# Patient Record
Sex: FEMALE | Race: WHITE | NOT HISPANIC OR LATINO | Employment: OTHER | URBAN - METROPOLITAN AREA
[De-identification: names, ages, dates, MRNs, and addresses within clinical notes are randomized per-mention and may not be internally consistent; named-entity substitution may affect disease eponyms.]

---

## 2023-05-18 LAB
GRAM STAIN: ABNORMAL
TISSUE/WOUND CULTURE/SMEAR: ABNORMAL
TISSUE/WOUND CULTURE/SMEAR: ABNORMAL

## 2023-05-26 ENCOUNTER — TELEPHONE (OUTPATIENT)
Dept: PRIMARY CARE | Facility: CLINIC | Age: 88
End: 2023-05-26
Payer: MEDICARE

## 2023-05-26 NOTE — TELEPHONE ENCOUNTER
----- Message from CALIXTO Noriega sent at 5/26/2023 10:16 AM EDT -----  Please reach out to the patient's daughter. The wound clinic CNP notified me of a lump to her hand and and an x-ray that was done. Was there an injury to the hand? Is she having pain where the lump is? Is the lump new?

## 2023-06-05 ENCOUNTER — OFFICE VISIT (OUTPATIENT)
Dept: PRIMARY CARE | Facility: CLINIC | Age: 88
End: 2023-06-05
Payer: MEDICARE

## 2023-06-05 VITALS — HEART RATE: 61 BPM | DIASTOLIC BLOOD PRESSURE: 82 MMHG | SYSTOLIC BLOOD PRESSURE: 163 MMHG

## 2023-06-05 DIAGNOSIS — D64.9 ANEMIA, UNSPECIFIED TYPE: ICD-10-CM

## 2023-06-05 DIAGNOSIS — I87.2 VENOUS INSUFFICIENCY: ICD-10-CM

## 2023-06-05 DIAGNOSIS — I10 BENIGN ESSENTIAL HYPERTENSION: Primary | ICD-10-CM

## 2023-06-05 DIAGNOSIS — M17.12 OSTEOARTHRITIS OF LEFT KNEE, UNSPECIFIED OSTEOARTHRITIS TYPE: ICD-10-CM

## 2023-06-05 DIAGNOSIS — Z71.89 DNR (DO NOT RESUSCITATE) DISCUSSION: ICD-10-CM

## 2023-06-05 PROBLEM — M17.9 OSTEOARTHRITIS OF KNEE: Status: ACTIVE | Noted: 2023-06-05

## 2023-06-05 PROBLEM — M17.11 ARTHRITIS OF RIGHT KNEE: Status: ACTIVE | Noted: 2023-06-05

## 2023-06-05 PROBLEM — M47.817 LUMBOSACRAL SPONDYLOSIS WITHOUT MYELOPATHY: Status: ACTIVE | Noted: 2018-10-04

## 2023-06-05 PROBLEM — F43.21 SITUATIONAL DEPRESSION: Status: ACTIVE | Noted: 2023-06-05

## 2023-06-05 PROBLEM — K43.9 SPIGELIAN HERNIA: Status: ACTIVE | Noted: 2023-06-05

## 2023-06-05 PROBLEM — E53.8 B12 DEFICIENCY: Status: ACTIVE | Noted: 2023-06-05

## 2023-06-05 PROBLEM — M19.90 ARTHRITIS PAIN: Status: ACTIVE | Noted: 2023-06-05

## 2023-06-05 PROBLEM — E87.1 HYPONATREMIA: Status: ACTIVE | Noted: 2023-06-05

## 2023-06-05 PROBLEM — G89.29 CHRONIC PAIN OF RIGHT KNEE: Status: ACTIVE | Noted: 2018-11-16

## 2023-06-05 PROBLEM — K83.8 INTRAHEPATIC BILE DUCT DILATION: Status: ACTIVE | Noted: 2023-06-05

## 2023-06-05 PROBLEM — L03.116 CELLULITIS OF LEFT LOWER LEG: Status: ACTIVE | Noted: 2023-06-05

## 2023-06-05 PROBLEM — M25.561 CHRONIC PAIN OF RIGHT KNEE: Status: ACTIVE | Noted: 2018-11-16

## 2023-06-05 PROBLEM — M47.26 OSTEOARTHRITIS OF SPINE WITH RADICULOPATHY, LUMBAR REGION: Status: ACTIVE | Noted: 2017-04-03

## 2023-06-05 PROBLEM — N34.2 ATROPHIC URETHRITIS: Status: ACTIVE | Noted: 2023-06-05

## 2023-06-05 PROBLEM — R19.8 ALTERNATING CONSTIPATION AND DIARRHEA: Status: ACTIVE | Noted: 2023-06-05

## 2023-06-05 PROBLEM — K62.5 RECTAL BLEEDING: Status: ACTIVE | Noted: 2023-06-05

## 2023-06-05 PROBLEM — K90.3 STEATORRHEA, PANCREATIC (HHS-HCC): Status: ACTIVE | Noted: 2023-06-05

## 2023-06-05 PROBLEM — I89.0 LYMPHEDEMA OF BOTH LOWER EXTREMITIES: Status: ACTIVE | Noted: 2023-06-05

## 2023-06-05 PROBLEM — S32.810A CLOSED PELVIC RING FRACTURE (MULTI): Status: ACTIVE | Noted: 2023-06-05

## 2023-06-05 PROBLEM — R07.89 ATYPICAL CHEST PAIN: Status: ACTIVE | Noted: 2023-06-05

## 2023-06-05 PROBLEM — Q45.3 ABNORMALITY OF PANCREATIC DUCT: Status: ACTIVE | Noted: 2023-06-05

## 2023-06-05 PROCEDURE — 1157F ADVNC CARE PLAN IN RCRD: CPT | Performed by: NURSE PRACTITIONER

## 2023-06-05 PROCEDURE — 3077F SYST BP >= 140 MM HG: CPT | Performed by: NURSE PRACTITIONER

## 2023-06-05 PROCEDURE — 3079F DIAST BP 80-89 MM HG: CPT | Performed by: NURSE PRACTITIONER

## 2023-06-05 PROCEDURE — 99214 OFFICE O/P EST MOD 30 MIN: CPT | Performed by: NURSE PRACTITIONER

## 2023-06-05 PROCEDURE — 1159F MED LIST DOCD IN RCRD: CPT | Performed by: NURSE PRACTITIONER

## 2023-06-05 PROCEDURE — 1036F TOBACCO NON-USER: CPT | Performed by: NURSE PRACTITIONER

## 2023-06-05 RX ORDER — LISINOPRIL AND HYDROCHLOROTHIAZIDE 10; 12.5 MG/1; MG/1
1 TABLET ORAL DAILY
COMMUNITY
Start: 2013-07-31 | End: 2023-07-10

## 2023-06-05 RX ORDER — LANOLIN ALCOHOL/MO/W.PET/CERES
500 CREAM (GRAM) TOPICAL DAILY
COMMUNITY
End: 2023-10-03 | Stop reason: ENTERED-IN-ERROR

## 2023-06-05 RX ORDER — DICYCLOMINE HYDROCHLORIDE 10 MG/1
1 CAPSULE ORAL EVERY 6 HOURS PRN
COMMUNITY
Start: 2023-05-23 | End: 2023-12-21 | Stop reason: ALTCHOICE

## 2023-06-05 RX ORDER — ACETAMINOPHEN 325 MG/1
325 TABLET ORAL EVERY 6 HOURS PRN
COMMUNITY
End: 2023-10-03 | Stop reason: ENTERED-IN-ERROR

## 2023-06-05 RX ORDER — CYANOCOBALAMIN 1000 UG/ML
1 INJECTION, SOLUTION INTRAMUSCULAR; SUBCUTANEOUS
COMMUNITY
Start: 2016-05-25 | End: 2023-06-05 | Stop reason: ALTCHOICE

## 2023-06-05 RX ORDER — IBUPROFEN 100 MG/5ML
1 SUSPENSION, ORAL (FINAL DOSE FORM) ORAL DAILY
COMMUNITY

## 2023-06-05 RX ORDER — VIT C/E/ZN/COPPR/LUTEIN/ZEAXAN 250MG-90MG
1 CAPSULE ORAL DAILY
COMMUNITY

## 2023-06-05 NOTE — PROGRESS NOTES
Subjective   Patient ID: Cassia Humphries is a 89 y.o. female who presents for Follow-up (Knees, x ray hand).    HPI  She is here with her daughter today for a follow up.  Tolerating medication well at current dose- no side effects. No chest pain, shortness of breath, palpitations or edema. No headaches, numbness, tingling, weakness or vision changes.  No dizziness.    She report she is having a lot of knee, back pain and shoulder pain.  She has also noted a lump to the right hand- recent x-ray reviewed today.  Is seeing a PA in Dr. Herring's office to discuss pain management.  Has tried maximum dose of daily tylenol.  She has also tried Naproxen, Gabapentin,Voltaren- no help.  She has also had cortisone injections.   Has also had an injection in the back.     Had a follow up visit with Dr. Maher.  Still has issues with diarrhea and soft stools.  Was on Creon and stools were too soft.  Started on bentyl for abdominal cramping recently.    Her daughter is currently in town and trying to get her to appointments. She is starting to think about assisted living/ nursing home due to her limited ability to walk. She lives alone in a 2 story home. She has a life alert.   She also has am electric stair lift to get upstairs and 2 walkers (1 for each floor).  Her daughter has noticed how much worse her knee is on the left. She is now not able to stand up straight and is dragging her foot.  She is not a surgical candidate.  She will be discussing options with pan management.  They would like to avoid opioid pain medications.    She is also following with wound care for an open are to the E.    We discussed long term goals of care including DNR today. She reports she does not wish to have CPR or life sustaining measures taken if her heart would stop.  DNRCCA signed today.    Review of Systems   Constitutional:  Negative for chills, fatigue and fever.   Eyes:  Negative for visual disturbance.   Respiratory:  Negative for cough,  chest tightness and shortness of breath.    Cardiovascular:  Negative for chest pain, palpitations and leg swelling.   Gastrointestinal:  Positive for abdominal pain. Negative for diarrhea, nausea and vomiting.   Musculoskeletal:  Positive for arthralgias, back pain and joint swelling.   Skin:  Positive for wound.   Neurological:  Positive for weakness. Negative for dizziness, numbness and headaches.       Objective   /82   Pulse 61     Physical Exam  Constitutional:       General: She is not in acute distress.     Appearance: Normal appearance. She is not toxic-appearing.   Eyes:      Extraocular Movements: Extraocular movements intact.      Conjunctiva/sclera: Conjunctivae normal.      Pupils: Pupils are equal, round, and reactive to light.   Cardiovascular:      Rate and Rhythm: Normal rate and regular rhythm.      Pulses: Normal pulses.      Heart sounds: Normal heart sounds, S1 normal and S2 normal. No murmur heard.  Pulmonary:      Effort: Pulmonary effort is normal. No respiratory distress.      Breath sounds: Normal breath sounds.   Abdominal:      General: Bowel sounds are normal.      Palpations: Abdomen is soft.      Tenderness: There is no abdominal tenderness.   Musculoskeletal:      Right lower leg: No edema.      Left lower leg: No edema.      Comments: (+) mobile lump noted to the right dorsal hand   (+) slightly TTP   Lymphadenopathy:      Cervical: No cervical adenopathy.   Neurological:      Mental Status: She is alert and oriented to person, place, and time.      Gait: Gait abnormal.   Psychiatric:         Attention and Perception: Attention normal.         Mood and Affect: Mood and affect normal.         Behavior: Behavior normal. Behavior is cooperative.         Thought Content: Thought content normal.         Cognition and Memory: Cognition normal.         Judgment: Judgment normal.         Assessment/Plan   Problem List Items Addressed This Visit          Circulatory    Benign  essential hypertension - Primary     Elevated today but generally well controlled. Check labs.         Relevant Orders    Comprehensive Metabolic Panel (Completed)    CBC (Completed)    Venous insufficiency     Edema is good today.            Musculoskeletal    Osteoarthritis of knee     Given deformity today on exam-and pain- check imaging.         Relevant Orders    XR knee left 3 views       Hematologic    Anemia     Check CBC to assess stability.            Other    DNR (do not resuscitate) discussion     DNRCCA signed today after discussion with the patient and her daughter Charlette (HCPOA).                 It has been a pleasure seeing you today!

## 2023-06-06 ENCOUNTER — LAB (OUTPATIENT)
Dept: LAB | Facility: LAB | Age: 88
End: 2023-06-06
Payer: MEDICARE

## 2023-06-06 DIAGNOSIS — I10 BENIGN ESSENTIAL HYPERTENSION: ICD-10-CM

## 2023-06-06 PROBLEM — Z71.89 DNR (DO NOT RESUSCITATE) DISCUSSION: Status: ACTIVE | Noted: 2023-06-06

## 2023-06-06 LAB
ALANINE AMINOTRANSFERASE (SGPT) (U/L) IN SER/PLAS: 14 U/L (ref 7–45)
ALBUMIN (G/DL) IN SER/PLAS: 3.8 G/DL (ref 3.4–5)
ALKALINE PHOSPHATASE (U/L) IN SER/PLAS: 68 U/L (ref 33–136)
ANION GAP IN SER/PLAS: 12 MMOL/L (ref 10–20)
ASPARTATE AMINOTRANSFERASE (SGOT) (U/L) IN SER/PLAS: 17 U/L (ref 9–39)
BILIRUBIN TOTAL (MG/DL) IN SER/PLAS: 0.5 MG/DL (ref 0–1.2)
CALCIUM (MG/DL) IN SER/PLAS: 9.7 MG/DL (ref 8.6–10.3)
CARBON DIOXIDE, TOTAL (MMOL/L) IN SER/PLAS: 25 MMOL/L (ref 21–32)
CHLORIDE (MMOL/L) IN SER/PLAS: 101 MMOL/L (ref 98–107)
CREATININE (MG/DL) IN SER/PLAS: 0.82 MG/DL (ref 0.5–1.05)
ERYTHROCYTE DISTRIBUTION WIDTH (RATIO) BY AUTOMATED COUNT: 14.7 % (ref 11.5–14.5)
ERYTHROCYTE MEAN CORPUSCULAR HEMOGLOBIN CONCENTRATION (G/DL) BY AUTOMATED: 32.6 G/DL (ref 32–36)
ERYTHROCYTE MEAN CORPUSCULAR VOLUME (FL) BY AUTOMATED COUNT: 103 FL (ref 80–100)
ERYTHROCYTES (10*6/UL) IN BLOOD BY AUTOMATED COUNT: 3.36 X10E12/L (ref 4–5.2)
GFR FEMALE: 67 ML/MIN/1.73M2
GLUCOSE (MG/DL) IN SER/PLAS: 78 MG/DL (ref 74–99)
HEMATOCRIT (%) IN BLOOD BY AUTOMATED COUNT: 34.7 % (ref 36–46)
HEMOGLOBIN (G/DL) IN BLOOD: 11.3 G/DL (ref 12–16)
LEUKOCYTES (10*3/UL) IN BLOOD BY AUTOMATED COUNT: 5.1 X10E9/L (ref 4.4–11.3)
PLATELETS (10*3/UL) IN BLOOD AUTOMATED COUNT: 333 X10E9/L (ref 150–450)
POTASSIUM (MMOL/L) IN SER/PLAS: 4.7 MMOL/L (ref 3.5–5.3)
PROTEIN TOTAL: 6.2 G/DL (ref 6.4–8.2)
SODIUM (MMOL/L) IN SER/PLAS: 133 MMOL/L (ref 136–145)
UREA NITROGEN (MG/DL) IN SER/PLAS: 23 MG/DL (ref 6–23)

## 2023-06-06 PROCEDURE — 36415 COLL VENOUS BLD VENIPUNCTURE: CPT

## 2023-06-06 PROCEDURE — 85027 COMPLETE CBC AUTOMATED: CPT

## 2023-06-06 PROCEDURE — 80053 COMPREHEN METABOLIC PANEL: CPT

## 2023-06-07 ASSESSMENT — ENCOUNTER SYMPTOMS
DIARRHEA: 0
DIZZINESS: 0
CHILLS: 0
FEVER: 0
NUMBNESS: 0
ARTHRALGIAS: 1
JOINT SWELLING: 1
FATIGUE: 0
NAUSEA: 0
VOMITING: 0
BACK PAIN: 1
WEAKNESS: 1
CHEST TIGHTNESS: 0
COUGH: 0
SHORTNESS OF BREATH: 0
PALPITATIONS: 0
ABDOMINAL PAIN: 1
WOUND: 1
HEADACHES: 0

## 2023-06-08 ENCOUNTER — TELEPHONE (OUTPATIENT)
Dept: PRIMARY CARE | Facility: CLINIC | Age: 88
End: 2023-06-08
Payer: MEDICARE

## 2023-06-08 RX ORDER — DULOXETIN HYDROCHLORIDE 20 MG/1
20 CAPSULE, DELAYED RELEASE ORAL NIGHTLY
Qty: 30 CAPSULE | Refills: 0 | COMMUNITY
Start: 2023-06-07 | End: 2023-10-09 | Stop reason: HOSPADM

## 2023-06-08 NOTE — TELEPHONE ENCOUNTER
Called patients daughter to clarify. She was awaiting to hear about the results. And if possible a image printout of the xray results to take to wound care. They did start her on Duloxetine 20 mg.

## 2023-06-09 ENCOUNTER — TELEPHONE (OUTPATIENT)
Dept: PRIMARY CARE | Facility: CLINIC | Age: 88
End: 2023-06-09
Payer: MEDICARE

## 2023-06-09 NOTE — TELEPHONE ENCOUNTER
----- Message from HELEN Noriega-CNP sent at 6/8/2023  4:55 PM EDT -----  Please let her daughter know her x-ray of the knee shows severe arthritis (progressed from last x-ray).

## 2023-06-09 NOTE — TELEPHONE ENCOUNTER
----- Message from HELEN Noriega-CNP sent at 6/8/2023  4:56 PM EDT -----  Please let her daughter know her labs overall were stable.  Her sodium level remains low at 133 but this is stable.  She still has a very mild anemia (stable).

## 2023-07-10 DIAGNOSIS — I10 BENIGN ESSENTIAL HYPERTENSION: Primary | ICD-10-CM

## 2023-07-10 PROBLEM — M20.41 HAMMER TOE OF RIGHT FOOT: Status: ACTIVE | Noted: 2023-07-10

## 2023-07-10 RX ORDER — LISINOPRIL AND HYDROCHLOROTHIAZIDE 10; 12.5 MG/1; MG/1
1 TABLET ORAL DAILY
Qty: 90 TABLET | Refills: 1 | Status: SHIPPED | OUTPATIENT
Start: 2023-07-10 | End: 2023-09-20 | Stop reason: SINTOL

## 2023-09-13 ENCOUNTER — TELEPHONE (OUTPATIENT)
Dept: PRIMARY CARE | Facility: CLINIC | Age: 88
End: 2023-09-13
Payer: MEDICARE

## 2023-09-13 NOTE — TELEPHONE ENCOUNTER
Spoke with Edie at [facility], Cassia is still listed under Dr Noonan's care, so I did go ahead and give her the new Tylenol order, she verbalized understanding

## 2023-09-13 NOTE — TELEPHONE ENCOUNTER
Edie, charge nurse at [facility], left a msg asking for the pt's tylenol order to be changed to Tylenol 500mg, 2 tabs BID.

## 2023-09-20 ENCOUNTER — NURSING HOME VISIT (OUTPATIENT)
Dept: PRIMARY CARE | Facility: CLINIC | Age: 88
End: 2023-09-20
Payer: MEDICARE

## 2023-09-20 VITALS
BODY MASS INDEX: 26.37 KG/M2 | DIASTOLIC BLOOD PRESSURE: 70 MMHG | HEART RATE: 66 BPM | SYSTOLIC BLOOD PRESSURE: 133 MMHG | WEIGHT: 135 LBS

## 2023-09-20 DIAGNOSIS — R00.2 PALPITATIONS: ICD-10-CM

## 2023-09-20 DIAGNOSIS — I26.99 PULMONARY EMBOLISM WITHOUT ACUTE COR PULMONALE, UNSPECIFIED CHRONICITY, UNSPECIFIED PULMONARY EMBOLISM TYPE (MULTI): Primary | ICD-10-CM

## 2023-09-20 PROCEDURE — 99309 SBSQ NF CARE MODERATE MDM 30: CPT | Performed by: INTERNAL MEDICINE

## 2023-09-20 RX ORDER — LISINOPRIL 5 MG/1
10 TABLET ORAL DAILY
COMMUNITY
End: 2023-10-03 | Stop reason: ENTERED-IN-ERROR

## 2023-09-20 NOTE — PROGRESS NOTES
Subjective   Patient ID: Cassia Humphries is a 89 y.o. female who presents for f/u      HPI   Overall well   #1 PE/DVT- discharge note 9/1/23 reviewed.  Presented after a fall and diagnosed with lower extremity DVT with multiple PEs.  No hemodynamic compromise.  Started on oral anticoagulation without complication.  Currently without chest pain.  No shortness of breath.  #2 ? Afib-some question of atrial fibrillation in the hospital.  [place] not to be A-fib by cardiology.  Cardiology recommended Holter monitor on discharge.  #3 HTN-hydrochlorothiazide discontinued in the hospital secondary to hyponatremia.  Since return has been 130-150 systolic.  #4 hyponatremia-off of hydrochlorothiazide nearing   #5 anemia- no bleeding or bruising.  Review of Systems   All other systems reviewed and are negative.      Objective   There were no vitals taken for this visit.    Physical Exam  Constitutional:       General: She is not in acute distress.     Appearance: Normal appearance. She is not ill-appearing or toxic-appearing.   HENT:      Head: Normocephalic and atraumatic.   Cardiovascular:      Rate and Rhythm: Normal rate and regular rhythm.      Heart sounds: No murmur heard.  Pulmonary:      Effort: Pulmonary effort is normal.      Breath sounds: Normal breath sounds. No wheezing or rales.   Neurological:      Mental Status: She is alert.   Psychiatric:         Mood and Affect: Mood normal.         Behavior: Behavior normal.         Judgment: Judgment normal.             Lab Results   Component Value Date    WBC 5.2 09/02/2023    HGB 11.1 (L) 09/02/2023    HCT 32.6 (L) 09/02/2023     09/02/2023    CHOL 204 (H) 08/10/2018    TRIG 111 08/10/2018    HDL 59.6 08/10/2018    ALT 17 09/01/2023    AST 21 09/01/2023     (L) 09/01/2023    K 4.1 09/01/2023     09/01/2023    CREATININE 0.71 09/01/2023    BUN 10 09/01/2023    CO2 22 09/01/2023    TSH 1.48 03/14/2022    INR 1.0 08/30/2023              CT Angiogram of  the Chest; 8/30/2023 2:31 PM.  INDICATION:  Syncope.  Elevated troponin.  COMPARISON:  None Available.     TECHNIQUE:  CTA of the chest was performed with intravenous contrast.   Image post-processing was generated on workstation.  Omnipaque 350--75  mL was administered intravenously.  Automated mA/kV exposure control was utilized and patient examination  was performed in strict accordance with principles of ALARA  FINDINGS:  There are multiple large segmental pulmonary emboli present within the  left upper lobe, lingula, left lower lobe, right upper lobe, right  lower lobe and interlobar pulmonary artery.  Ascending thoracic aorta  3.9 cm diameter.  Negative for aortic dissection.     The heart is enlarged.  Right heart strain with dilatation of the  right ventricle.  Thoracic lymph nodes are not enlarged.     There is no pleural effusion, pleural thickening, or pneumothorax.   The airways are patent.     Lungs are clear without consolidation, interstitial disease, or  suspicious nodules.     Upper abdomen demonstrates no acute pathology.     There are no acute fractures.  No suspicious bony lesions.   Degenerative changes lower thoracic spine and bilateral shoulders.     IMPRESSION:  Multiple bilateral segmental pulmonary emboli as well as clot within  the interlobar pulmonary artery.  Right heart strain with dilatation right ventricle.  No regions of airspace consolidation     Assessment/Plan     #1 PE/DVT-continue.  Fall precautions reviewed.Doing well on oral anticoagulation.  No clear precipitators of ovarian mobile.  #2 ? Afib-currently in sinus rhythm.  Will order outpatient Holter monitor.    #3 HTN- Increase lisinopril to 10 mg.  #4 hyponatremia-recheck BMP  #5 anemia-recheck CBC continue physical therapy/Occupational Therapy.

## 2023-09-21 LAB
ANION GAP IN SER/PLAS: 13 MMOL/L (ref 10–20)
BASOPHILS (10*3/UL) IN BLOOD BY AUTOMATED COUNT: 0.05 X10E9/L (ref 0–0.1)
BASOPHILS/100 LEUKOCYTES IN BLOOD BY AUTOMATED COUNT: 1 % (ref 0–2)
CALCIUM (MG/DL) IN SER/PLAS: 9 MG/DL (ref 8.6–10.3)
CARBON DIOXIDE, TOTAL (MMOL/L) IN SER/PLAS: 23 MMOL/L (ref 21–32)
CHLORIDE (MMOL/L) IN SER/PLAS: 96 MMOL/L (ref 98–107)
CREATININE (MG/DL) IN SER/PLAS: 0.6 MG/DL (ref 0.5–1.05)
EOSINOPHILS (10*3/UL) IN BLOOD BY AUTOMATED COUNT: 0.14 X10E9/L (ref 0–0.4)
EOSINOPHILS/100 LEUKOCYTES IN BLOOD BY AUTOMATED COUNT: 2.8 % (ref 0–6)
ERYTHROCYTE DISTRIBUTION WIDTH (RATIO) BY AUTOMATED COUNT: 14.7 % (ref 11.5–14.5)
ERYTHROCYTE MEAN CORPUSCULAR HEMOGLOBIN CONCENTRATION (G/DL) BY AUTOMATED: 32.4 G/DL (ref 32–36)
ERYTHROCYTE MEAN CORPUSCULAR VOLUME (FL) BY AUTOMATED COUNT: 102 FL (ref 80–100)
ERYTHROCYTES (10*6/UL) IN BLOOD BY AUTOMATED COUNT: 3.19 X10E12/L (ref 4–5.2)
GFR FEMALE: 85 ML/MIN/1.73M2
GLUCOSE (MG/DL) IN SER/PLAS: 91 MG/DL (ref 74–99)
HEMATOCRIT (%) IN BLOOD BY AUTOMATED COUNT: 32.4 % (ref 36–46)
HEMOGLOBIN (G/DL) IN BLOOD: 10.5 G/DL (ref 12–16)
IMMATURE GRANULOCYTES/100 LEUKOCYTES IN BLOOD BY AUTOMATED COUNT: 0.6 % (ref 0–0.9)
LEUKOCYTES (10*3/UL) IN BLOOD BY AUTOMATED COUNT: 5 X10E9/L (ref 4.4–11.3)
LYMPHOCYTES (10*3/UL) IN BLOOD BY AUTOMATED COUNT: 0.71 X10E9/L (ref 0.8–3)
LYMPHOCYTES/100 LEUKOCYTES IN BLOOD BY AUTOMATED COUNT: 14.3 % (ref 13–44)
MONOCYTES (10*3/UL) IN BLOOD BY AUTOMATED COUNT: 0.54 X10E9/L (ref 0.05–0.8)
MONOCYTES/100 LEUKOCYTES IN BLOOD BY AUTOMATED COUNT: 10.8 % (ref 2–10)
NEUTROPHILS (10*3/UL) IN BLOOD BY AUTOMATED COUNT: 3.51 X10E9/L (ref 1.6–5.5)
NEUTROPHILS/100 LEUKOCYTES IN BLOOD BY AUTOMATED COUNT: 70.5 % (ref 40–80)
NRBC (PER 100 WBCS) BY AUTOMATED COUNT: 0 /100 WBC (ref 0–0)
PLATELETS (10*3/UL) IN BLOOD AUTOMATED COUNT: 369 X10E9/L (ref 150–450)
POTASSIUM (MMOL/L) IN SER/PLAS: 4.7 MMOL/L (ref 3.5–5.3)
SODIUM (MMOL/L) IN SER/PLAS: 127 MMOL/L (ref 136–145)
UREA NITROGEN (MG/DL) IN SER/PLAS: 15 MG/DL (ref 6–23)

## 2023-09-25 ENCOUNTER — TELEPHONE (OUTPATIENT)
Dept: PRIMARY CARE | Facility: CLINIC | Age: 88
End: 2023-09-25
Payer: MEDICARE

## 2023-09-25 DIAGNOSIS — D69.6 THROMBOCYTOPENIA (CMS-HCC): ICD-10-CM

## 2023-09-25 DIAGNOSIS — D64.9 ANEMIA, UNSPECIFIED TYPE: ICD-10-CM

## 2023-09-25 NOTE — TELEPHONE ENCOUNTER
I spoke with the pt's son, Jitendra (on the HIPAA), and relayed the results msg.  He said that his mom has been in [facility] and they ran a lot of tests, so he didn't know if what they ran was what you want her to repeat.  He said she is waiting transport back to [facility] currently.  Do you still need her to have the blood work you you asked to have repeated in a few days?  If so, this will need to be faxed to [facility].  Fx: [phone]

## 2023-09-27 NOTE — TELEPHONE ENCOUNTER
Pt's daughter, Charlette, left a msg that she would like to speak to you regarding her mom and the results.  She was told by Marivel Sellers that mom has an infection and she doesn't feel that she does and that there is something else going on.  She would like to discuss the sodium levels in her blood also.  Please call her.  Ph: [phone]

## 2023-10-03 ENCOUNTER — APPOINTMENT (OUTPATIENT)
Dept: RADIOLOGY | Facility: HOSPITAL | Age: 88
DRG: 645 | End: 2023-10-03
Payer: MEDICARE

## 2023-10-03 ENCOUNTER — HOSPITAL ENCOUNTER (INPATIENT)
Facility: HOSPITAL | Age: 88
LOS: 6 days | Discharge: NURSING, REHAB OR LONG-TERM CARE | DRG: 645 | End: 2023-10-09
Attending: GENERAL PRACTICE | Admitting: HOSPITALIST
Payer: MEDICARE

## 2023-10-03 ENCOUNTER — TELEPHONE (OUTPATIENT)
Dept: PRIMARY CARE | Facility: CLINIC | Age: 88
End: 2023-10-03

## 2023-10-03 DIAGNOSIS — K59.00 CONSTIPATION, UNSPECIFIED CONSTIPATION TYPE: ICD-10-CM

## 2023-10-03 DIAGNOSIS — I87.2 VENOUS INSUFFICIENCY: ICD-10-CM

## 2023-10-03 DIAGNOSIS — I10 HYPERTENSION, UNSPECIFIED TYPE: ICD-10-CM

## 2023-10-03 DIAGNOSIS — E87.1 HYPONATREMIA: Primary | ICD-10-CM

## 2023-10-03 LAB
ALBUMIN SERPL BCP-MCNC: 3.7 G/DL (ref 3.4–5)
ALP SERPL-CCNC: 81 U/L (ref 33–136)
ALT SERPL W P-5'-P-CCNC: 24 U/L (ref 7–45)
ANION GAP SERPL CALC-SCNC: 11 MMOL/L (ref 10–20)
AST SERPL W P-5'-P-CCNC: 23 U/L (ref 9–39)
BILIRUB SERPL-MCNC: 0.6 MG/DL (ref 0–1.2)
BUN SERPL-MCNC: 18 MG/DL (ref 6–23)
CALCIUM SERPL-MCNC: 9.3 MG/DL (ref 8.6–10.3)
CARDIAC TROPONIN I PNL SERPL HS: 8 NG/L (ref 0–13)
CARDIAC TROPONIN I PNL SERPL HS: 9 NG/L (ref 0–13)
CHLORIDE SERPL-SCNC: 89 MMOL/L (ref 98–107)
CO2 SERPL-SCNC: 23 MMOL/L (ref 21–32)
CREAT SERPL-MCNC: 0.99 MG/DL (ref 0.5–1.05)
ERYTHROCYTE [DISTWIDTH] IN BLOOD BY AUTOMATED COUNT: 14.6 % (ref 11.5–14.5)
GFR SERPL CREATININE-BSD FRML MDRD: 54 ML/MIN/1.73M*2
GLUCOSE SERPL-MCNC: 81 MG/DL (ref 74–99)
HCT VFR BLD AUTO: 29.9 % (ref 36–46)
HGB BLD-MCNC: 10.2 G/DL (ref 12–16)
INR PPP: 1.4 (ref 0.9–1.1)
MAGNESIUM SERPL-MCNC: 1.8 MG/DL (ref 1.6–2.4)
MCH RBC QN AUTO: 33.7 PG (ref 26–34)
MCHC RBC AUTO-ENTMCNC: 34.1 G/DL (ref 32–36)
MCV RBC AUTO: 99 FL (ref 80–100)
NRBC BLD-RTO: 0 /100 WBCS (ref 0–0)
PLATELET # BLD AUTO: 381 X10*3/UL (ref 150–450)
PMV BLD AUTO: 9.1 FL (ref 7.5–11.5)
POTASSIUM SERPL-SCNC: 5.2 MMOL/L (ref 3.5–5.3)
PROT SERPL-MCNC: 5.8 G/DL (ref 6.4–8.2)
PROTHROMBIN TIME: 16.1 SECONDS (ref 9.8–12.8)
RBC # BLD AUTO: 3.03 X10*6/UL (ref 4–5.2)
SODIUM SERPL-SCNC: 118 MMOL/L (ref 136–145)
WBC # BLD AUTO: 4.6 X10*3/UL (ref 4.4–11.3)

## 2023-10-03 PROCEDURE — 2500000004 HC RX 250 GENERAL PHARMACY W/ HCPCS (ALT 636 FOR OP/ED): Performed by: GENERAL PRACTICE

## 2023-10-03 PROCEDURE — 36415 COLL VENOUS BLD VENIPUNCTURE: CPT | Performed by: GENERAL PRACTICE

## 2023-10-03 PROCEDURE — 99285 EMERGENCY DEPT VISIT HI MDM: CPT | Performed by: GENERAL PRACTICE

## 2023-10-03 PROCEDURE — 71275 CT ANGIOGRAPHY CHEST: CPT | Performed by: RADIOLOGY

## 2023-10-03 PROCEDURE — 71275 CT ANGIOGRAPHY CHEST: CPT | Mod: MG

## 2023-10-03 PROCEDURE — 99223 1ST HOSP IP/OBS HIGH 75: CPT | Performed by: HOSPITALIST

## 2023-10-03 PROCEDURE — 84484 ASSAY OF TROPONIN QUANT: CPT | Performed by: GENERAL PRACTICE

## 2023-10-03 PROCEDURE — 96360 HYDRATION IV INFUSION INIT: CPT

## 2023-10-03 PROCEDURE — 96361 HYDRATE IV INFUSION ADD-ON: CPT

## 2023-10-03 PROCEDURE — 1210000001 HC SEMI-PRIVATE ROOM DAILY

## 2023-10-03 PROCEDURE — 2550000001 HC RX 255 CONTRASTS: Performed by: GENERAL PRACTICE

## 2023-10-03 PROCEDURE — 85610 PROTHROMBIN TIME: CPT | Performed by: GENERAL PRACTICE

## 2023-10-03 PROCEDURE — 83735 ASSAY OF MAGNESIUM: CPT | Performed by: GENERAL PRACTICE

## 2023-10-03 PROCEDURE — G1004 CDSM NDSC: HCPCS

## 2023-10-03 PROCEDURE — 85027 COMPLETE CBC AUTOMATED: CPT | Performed by: GENERAL PRACTICE

## 2023-10-03 PROCEDURE — 80053 COMPREHEN METABOLIC PANEL: CPT | Performed by: GENERAL PRACTICE

## 2023-10-03 RX ORDER — SODIUM CHLORIDE 9 MG/ML
50 INJECTION, SOLUTION INTRAVENOUS CONTINUOUS
Status: DISCONTINUED | OUTPATIENT
Start: 2023-10-03 | End: 2023-10-04

## 2023-10-03 RX ORDER — BISACODYL 10 MG/1
10 SUPPOSITORY RECTAL DAILY PRN
COMMUNITY
End: 2024-02-14 | Stop reason: ALTCHOICE

## 2023-10-03 RX ORDER — ACETAMINOPHEN 325 MG/1
650 TABLET ORAL EVERY 4 HOURS PRN
Status: DISCONTINUED | OUTPATIENT
Start: 2023-10-03 | End: 2023-10-03 | Stop reason: SDUPTHER

## 2023-10-03 RX ORDER — LISINOPRIL 10 MG/1
10 TABLET ORAL DAILY
Status: DISCONTINUED | OUTPATIENT
Start: 2023-10-04 | End: 2023-10-09 | Stop reason: HOSPADM

## 2023-10-03 RX ORDER — UBIDECARENONE 75 MG
500 CAPSULE ORAL DAILY
COMMUNITY

## 2023-10-03 RX ORDER — SENNOSIDES 8.6 MG/1
2 TABLET ORAL 2 TIMES DAILY
Status: DISCONTINUED | OUTPATIENT
Start: 2023-10-03 | End: 2023-10-09 | Stop reason: HOSPADM

## 2023-10-03 RX ORDER — DEXTROMETHORPHAN HYDROBROMIDE, GUAIFENESIN 5; 100 MG/5ML; MG/5ML
2 LIQUID ORAL 2 TIMES DAILY
COMMUNITY
End: 2023-12-21 | Stop reason: ALTCHOICE

## 2023-10-03 RX ORDER — LISINOPRIL 10 MG/1
10 TABLET ORAL DAILY
Status: ON HOLD | COMMUNITY
End: 2023-10-09 | Stop reason: SDUPTHER

## 2023-10-03 RX ORDER — ADHESIVE BANDAGE
30 BANDAGE TOPICAL
COMMUNITY
End: 2023-10-30 | Stop reason: ALTCHOICE

## 2023-10-03 RX ORDER — BISACODYL 10 MG/1
10 SUPPOSITORY RECTAL ONCE AS NEEDED
Status: DISCONTINUED | OUTPATIENT
Start: 2023-10-03 | End: 2023-10-09 | Stop reason: HOSPADM

## 2023-10-03 RX ORDER — CHOLECALCIFEROL (VITAMIN D3) 25 MCG
25 TABLET ORAL DAILY
Status: DISCONTINUED | OUTPATIENT
Start: 2023-10-04 | End: 2023-10-09 | Stop reason: HOSPADM

## 2023-10-03 RX ORDER — MAGNESIUM HYDROXIDE 2400 MG/10ML
30 SUSPENSION ORAL DAILY PRN
Status: DISCONTINUED | OUTPATIENT
Start: 2023-10-03 | End: 2023-10-09 | Stop reason: HOSPADM

## 2023-10-03 RX ORDER — DICYCLOMINE HYDROCHLORIDE 10 MG/1
10 CAPSULE ORAL EVERY 6 HOURS PRN
Status: DISCONTINUED | OUTPATIENT
Start: 2023-10-03 | End: 2023-10-09 | Stop reason: HOSPADM

## 2023-10-03 RX ORDER — UBIDECARENONE 75 MG
500 CAPSULE ORAL DAILY
Status: DISCONTINUED | OUTPATIENT
Start: 2023-10-04 | End: 2023-10-09 | Stop reason: HOSPADM

## 2023-10-03 RX ORDER — ACETAMINOPHEN 325 MG/1
650 TABLET ORAL EVERY 6 HOURS
Status: DISCONTINUED | OUTPATIENT
Start: 2023-10-03 | End: 2023-10-04

## 2023-10-03 RX ADMIN — IOHEXOL 75 ML: 350 INJECTION, SOLUTION INTRAVENOUS at 14:52

## 2023-10-03 RX ADMIN — SODIUM CHLORIDE 1000 ML: 9 INJECTION, SOLUTION INTRAVENOUS at 13:50

## 2023-10-03 ASSESSMENT — ENCOUNTER SYMPTOMS
NAUSEA: 1
EYES NEGATIVE: 1
CARDIOVASCULAR NEGATIVE: 1
ALLERGIC/IMMUNOLOGIC NEGATIVE: 1
PSYCHIATRIC NEGATIVE: 1
BACK PAIN: 1
CONSTITUTIONAL NEGATIVE: 1
RESPIRATORY NEGATIVE: 1
HEADACHES: 1
HEMATOLOGIC/LYMPHATIC NEGATIVE: 1

## 2023-10-03 ASSESSMENT — COLUMBIA-SUICIDE SEVERITY RATING SCALE - C-SSRS
2. HAVE YOU ACTUALLY HAD ANY THOUGHTS OF KILLING YOURSELF?: NO
6. HAVE YOU EVER DONE ANYTHING, STARTED TO DO ANYTHING, OR PREPARED TO DO ANYTHING TO END YOUR LIFE?: NO
1. IN THE PAST MONTH, HAVE YOU WISHED YOU WERE DEAD OR WISHED YOU COULD GO TO SLEEP AND NOT WAKE UP?: NO

## 2023-10-03 NOTE — ED PROVIDER NOTES
HPI   Chief Complaint   Patient presents with    abnormal labs       HPI: 89-year-old female with a history of pulmonary embolism on anticoagulants, NSTEMI,, hyponatremia, macular degeneration and HTN presents for hyponatremia and chest pain.  Labs were drawn at her nursing facility today which showed a sodium of 122.  She also reports an episode of mild chest pressure and shortness of breath this morning.  She is asymptomatic on presentation.  She is compliant with her Eliquis      Limitations to history: None  Independent Historians: Patient, daughter  External Records Reviewed: HIE, outpatient notes, inpatient notes  ------------------------------------------------------------------------------------------------------------------------------------------  ROS: a ten point review of systems was performed and was negative except as per HPI.  ------------------------------------------------------------------------------------------------------------------------------------------  PMH / PSH: as per HPI, otherwise reviewed in EMR  MEDS: as per HPI, otherwise reviewed in EMR  ALLERGIES: as per HPI, otherwise reviewed in EMR  SocH:  as per HPI, otherwise reviewed in EMR  FH:  as per HPI, otherwise reviewed in EMR  ------------------------------------------------------------------------------------------------------------------------------------------  Physical Exam:  VS: As documented in the triage note and EMR flowsheet from this visit was reviewed  General: Well appearing. No acute distress.   Eyes:  Extraocular movements grossly intact. No scleral icterus. No discharge  HEENT:  Normocephalic.  Atraumatic  Neck: Moves neck freely. No gross masses  CV: Regular rhythm. No murmurs, rubs or gallops   Resp: Clear to auscultation bilaterally. No respiratory distress.    GI: Soft, no masses, nontender. No rebound tenderness or guarding  MSK: Symmetric muscle bulk. No deformities. No lower extremity edema.    Skin: Warm, dry,  intact.   Neuro: No focal deficits.  A&O x3.   Psych: Appropriate for situation  ------------------------------------------------------------------------------------------------------------------------------------------  Hospital Course / Medical Decision Making:  Independent Interpretations: CT chest  EKG as interpreted by me: Normal sinus rhythm at 69 bpm with a normal axis, no bundle branch block and no signs of acute ischemia    MDM: This is a 89-year-old female with a history of NSTEMI, pulmonary embolism and hyponatremia presenting for reported hyponatremia. Sodium found to be 118 in the ED today.  She was started on an infusion of normal saline.  She is some mild chest pain that was present earlier in the day.  CT of the patient's chest shows no acute intrathoracic abnormality.  Her family was provided with a copy of the imaging results.  The patient was admitted to the medicine service for further evaluation.    Discussion of Management with Other Providers:   I discussed the patient/results with: Emergency medicine team    Final diagnosis and disposition as below.    XR hip left 2 or 3 views   Final Result    Limited exam. Osteopenia. No definite acute displaced osseous pelvic    or left hip fracture. Probable remote fracture deformity of the left    inferior pubic ramus. Multifocal degenerative changes. Fecal    retention.          MACRO:    None.          Signed by: Faye Woodson 10/8/2023 8:30 AM    Dictation workstation:   [id number]     XR abdomen 1 view   Final Result    1. A nonspecific, nonobstructive bowel gas pattern.          MACRO:    None          Signed by: Ric Barboza 10/6/2023 1:54 PM    Dictation workstation:   [id number]     CT angio chest for pulmonary embolism   Final Result    No evidence of PE.          No acute intrathoracic abnormality. Basilar atelectasis.          Signed by: Monika Centeno 10/3/2023 3:16 PM    Dictation workstation:   [id number]                                  Michelle Coma Scale Score: 15                  Patient History   Past Medical History:   Diagnosis Date    Anterior displaced type ii dens fracture, sequela 06/25/2019    Odontoid fracture, sequela    Encounter for general adult medical examination without abnormal findings 07/19/2019    Medicare annual wellness visit, initial    Localized edema 05/28/2020    Bilateral edema of lower extremity    Multiple fractures of pelvis with stable disruption of pelvic ring, initial encounter for closed fracture (CMS/[facility]) 04/01/2019    Closed pelvic ring fracture, initial encounter    Pain in left shoulder 01/08/2019    Left shoulder pain    Personal history of other diseases of the musculoskeletal system and connective tissue 07/19/2019    History of low back pain    Personal history of other diseases of the musculoskeletal system and connective tissue 06/24/2019    History of neck pain    Primary osteoarthritis, right shoulder 07/19/2019    DJD of right shoulder    Segmental and somatic dysfunction of abdomen and other regions 11/03/2017    Somatic dysfunction of back    Stiffness of left shoulder, not elsewhere classified 01/08/2019    Stiffness of left shoulder, not elsewhere classified    Unilateral primary osteoarthritis, right knee 08/14/2017    Right knee DJD    Unspecified displaced fracture of second cervical vertebra, initial encounter for closed fracture (CMS/[facility]) 03/01/2019    Closed odontoid fracture, initial encounter    Unspecified displaced fracture of second cervical vertebra, subsequent encounter for fracture with routine healing 07/19/2019    Closed odontoid fracture with routine healing, subsequent encounter    Unspecified symptoms and signs involving the genitourinary system     UTI symptoms    Urinary tract infection, site not specified     Frequent UTI     Past Surgical History:   Procedure Laterality Date    CT NECK ANGIO W AND WO IV CONTRAST  1/24/2019    CT NECK ANGIO W AND WO IV CONTRAST 1/24/2019  [email or web address] CLINICAL LEGACY    MR HEAD ANGIO WO IV CONTRAST  2016    MR HEAD ANGIO WO IV CONTRAST 2016 [facility] EMERGENCY LEGACY    MR NECK ANGIO WO IV CONTRAST  2016    MR NECK ANGIO WO IV CONTRAST 2016 [facility] EMERGENCY LEGACY    OTHER SURGICAL HISTORY  2019    No history of surgery     Family History   Problem Relation Name Age of Onset    Aortic aneurysm Father       Social History     Tobacco Use    Smoking status: Former     Packs/day: 1.00     Years: 10.00     Additional pack years: 0.00     Total pack years: 10.00     Types: Cigarettes     Quit date:      Years since quittin.7    Smokeless tobacco: Never   Substance Use Topics    Alcohol use: Not on file    Drug use: Not on file       Physical Exam   ED Triage Vitals [10/03/23 1145]   Temp Heart Rate Resp BP   36.6 °C (97.9 °F) 86 17 149/51      SpO2 Temp Source Heart Rate Source Patient Position   95 % Oral Monitor Sitting      BP Location FiO2 (%)     Left arm --       Physical Exam    ED Course & MDM   Diagnoses as of 10/08/23 1447   Hyponatremia       Medical Decision Making      Procedure  Procedures     Rasheed Pate,   10/08/23 1450

## 2023-10-03 NOTE — ED TRIAGE NOTES
PT BIB EMS FROM [facility] WITH ABNORMAL LABS. PT/EMS DO NOT KNOW WHICH LABS WERE ABNORMAL. PT HAS NO COMPLAINTS AT THIS TIME.

## 2023-10-03 NOTE — H&P
History Of Present Illness  Cassia Humphries is a 89 y.o. female with a PMH of chronic hyponatremia, HTN, hx of rectal bleed, who was sent to the ED by Corin Gordon for hyponatremia found on labs this morning.  Early this morning Ms Humphries heard a male voice calling her name over and over. She also had HA and was nauseous, no vomiting. Other than her chronic back pain she had no other complaints.   From what I understand, Corin Aguilera contacted Dr Jenifer Noonan, who then recommended blood work, which showed hyponatremia, a Na of 122. She is chronically hyponatremic, with a blood glu usually 126-129.   Ms Humphries does not feel that she drinks an excessive amount of water. Also drinks coffee and ginger ale.     Work up in the ED shows low Na at 118.   Remainder of the labs are unremarkable, except for anemia with a Hb of 10.       Past Medical History  She has a past medical history of Anterior displaced type ii dens fracture, sequela (06/25/2019), Encounter for general adult medical examination without abnormal findings (07/19/2019), Localized edema (05/28/2020), Multiple fractures of pelvis with stable disruption of pelvic ring, initial encounter for closed fracture (CMS/[facility]) (04/01/2019), Pain in left shoulder (01/08/2019), Personal history of other diseases of the musculoskeletal system and connective tissue (07/19/2019), Personal history of other diseases of the musculoskeletal system and connective tissue (06/24/2019), Primary osteoarthritis, right shoulder (07/19/2019), Segmental and somatic dysfunction of abdomen and other regions (11/03/2017), Stiffness of left shoulder, not elsewhere classified (01/08/2019), Unilateral primary osteoarthritis, right knee (08/14/2017), Unspecified displaced fracture of second cervical vertebra, initial encounter for closed fracture (CMS/[facility]) (03/01/2019), Unspecified displaced fracture of second cervical vertebra, subsequent encounter for fracture with routine healing  (07/19/2019), Unspecified symptoms and signs involving the genitourinary system, and Urinary tract infection, site not specified.    Surgical History  She has a past surgical history that includes Other surgical history (07/01/2019); CT angio neck w and wo IV contrast (1/24/2019); MR angio head wo IV contrast (8/13/2016); and MR angio neck wo IV contrast (8/13/2016).     Social History  She reports that she quit smoking about 61 years ago. Her smoking use included cigarettes. She has a 10.00 pack-year smoking history. She has never used smokeless tobacco. No history on file for alcohol use and drug use.    Family History  Family History   Problem Relation Name Age of Onset    Aortic aneurysm Father          Allergies  Patient has no known allergies.    Review of Systems   Constitutional: Negative.    HENT: Negative.     Eyes: Negative.    Respiratory: Negative.     Cardiovascular: Negative.    Gastrointestinal:  Positive for nausea.   Genitourinary: Negative.    Musculoskeletal:  Positive for back pain.   Skin: Negative.    Allergic/Immunologic: Negative.    Neurological:  Positive for headaches.   Hematological: Negative.    Psychiatric/Behavioral: Negative.          Physical Exam  Vitals reviewed.   Constitutional:       Appearance: Normal appearance.   HENT:      Head: Normocephalic and atraumatic.      Mouth/Throat:      Mouth: Mucous membranes are moist.   Eyes:      Extraocular Movements: Extraocular movements intact.      Conjunctiva/sclera: Conjunctivae normal.      Pupils: Pupils are equal, round, and reactive to light.   Cardiovascular:      Rate and Rhythm: Normal rate and regular rhythm.      Pulses: Normal pulses.      Heart sounds: Normal heart sounds.   Pulmonary:      Effort: Pulmonary effort is normal.      Breath sounds: Normal breath sounds.   Abdominal:      General: Abdomen is flat. Bowel sounds are normal.      Palpations: Abdomen is soft.   Musculoskeletal:         General: Normal range of  motion.   Skin:     General: Skin is warm and dry.   Neurological:      General: No focal deficit present.      Mental Status: She is alert and oriented to person, place, and time.   Psychiatric:         Mood and Affect: Mood normal.         Behavior: Behavior normal.         Thought Content: Thought content normal.         Judgment: Judgment normal.          Last Recorded Vitals  Blood pressure 133/73, pulse 75, temperature 36.6 °C (97.9 °F), temperature source Oral, resp. rate 22, SpO2 94 %.    Relevant Results      Results for orders placed or performed during the hospital encounter of 10/03/23 (from the past 24 hour(s))   CBC   Result Value Ref Range    WBC 4.6 4.4 - 11.3 x10*3/uL    nRBC 0.0 0.0 - 0.0 /100 WBCs    RBC 3.03 (L) 4.00 - 5.20 x10*6/uL    Hemoglobin 10.2 (L) 12.0 - 16.0 g/dL    Hematocrit 29.9 (L) 36.0 - 46.0 %    MCV 99 80 - 100 fL    MCH 33.7 26.0 - 34.0 pg    MCHC 34.1 32.0 - 36.0 g/dL    RDW 14.6 (H) 11.5 - 14.5 %    Platelets 381 150 - 450 x10*3/uL    MPV 9.1 7.5 - 11.5 fL   Comprehensive metabolic panel   Result Value Ref Range    Glucose 81 74 - 99 mg/dL    Sodium 118 (LL) 136 - 145 mmol/L    Potassium 5.2 3.5 - 5.3 mmol/L    Chloride 89 (L) 98 - 107 mmol/L    Bicarbonate 23 21 - 32 mmol/L    Anion Gap 11 10 - 20 mmol/L    Urea Nitrogen 18 6 - 23 mg/dL    Creatinine 0.99 0.50 - 1.05 mg/dL    eGFR 54 (L) >60 mL/min/1.73m*2    Calcium 9.3 8.6 - 10.3 mg/dL    Albumin 3.7 3.4 - 5.0 g/dL    Alkaline Phosphatase 81 33 - 136 U/L    Total Protein 5.8 (L) 6.4 - 8.2 g/dL    AST 23 9 - 39 U/L    Bilirubin, Total 0.6 0.0 - 1.2 mg/dL    ALT 24 7 - 45 U/L   Protime-INR   Result Value Ref Range    Protime 16.1 (H) 9.8 - 12.8 seconds    INR 1.4 (H) 0.9 - 1.1   Troponin I, High Sensitivity   Result Value Ref Range    Troponin I, High Sensitivity 8 0 - 13 ng/L   Magnesium   Result Value Ref Range    Magnesium 1.80 1.60 - 2.40 mg/dL   Troponin I, High Sensitivity   Result Value Ref Range    Troponin I, High  Sensitivity 9 0 - 13 ng/L      CT angio chest for pulmonary embolism    Result Date: 10/3/2023  Interpreted By:  Monika Centeno, STUDY: CT ANGIO CHEST FOR PULMONARY EMBOLISM;  10/3/2023 3:07 pm   INDICATION: Signs/Symptoms:chest pain this AM, h/o PE.   COMPARISON: 08/30/2023   ACCESSION NUMBER(S): [id number]   ORDERING CLINICIAN: KARTIK VÁSQUEZ   TECHNIQUE: Serial axial CT images of the chest obtained following intravenous administration 35 minutes Omnipaque 350. Sagittal and coronal reconstructions were generated.. Sagittal, coronal and MIP reconstructions were generated.   FINDINGS: VESSELS: There is good opacification of the pulmonary arteries. There are no obvious pulmonary artery emboli.   There are atherosclerotic changes of the aorta. The cava is unremarkable.   HEART:  The heart is normal in size.   MEDIASTINUM AND ALLIE: There is no significant mediastinal or hilar lymphadenopathy.   LUNG, PLEURA, AND LARGE AIRWAYS: There is no consolidation or pleural fluid. There is basilar atelectasis. There is elevation of left hemidiaphragm.   CHEST WALL AND LOWER NECK: The thyroid is unremarkable.   BONES: There are changes of the spine. The patient is scoliotic. There are degenerative changes of the shoulders.   UPPER ABDOMEN: Unremarkable   COMPARISON TO THE PRIOR EXAM: Pulmonary artery emboli visible previously have resolved.       No evidence of PE.   No acute intrathoracic abnormality. Basilar atelectasis.   Signed by: Monika Centeno 10/3/2023 3:16 PM Dictation workstation:   [id number]       Assessment/Plan   Principal Problem: Hyponatremia  - Urine osm, serum Osm,   - urine NA  - pt produced a large amt of pale yellow urine in the ED, possibly drinking too much water, or similar  - Hold duloxetine since this may cause hyponatremia  - Fluid restrict to 1200  - Strict I&O  - NS@75cc/hr  - Q4hr BMP  Nephrology consult    HTN  - continue lisinopril    Hx of thrombosis  - continue Eliquis             I spent 45 minutes  in the professional and overall care of this patient.      Omaira Shea MD

## 2023-10-03 NOTE — TELEPHONE ENCOUNTER
Charlette, pt's daughter, left a msg stating that mom was taken to [facility].  She is wanting to know the results of the blood work done yesterday, and when can she expect the ones that were done today.  Please call  [phone]

## 2023-10-04 PROBLEM — G89.29 OTHER CHRONIC PAIN: Status: ACTIVE | Noted: 2023-10-04

## 2023-10-04 PROBLEM — I82.90 VTE (VENOUS THROMBOEMBOLISM): Status: ACTIVE | Noted: 2023-10-04

## 2023-10-04 PROBLEM — M12.811 ROTATOR CUFF ARTHROPATHY OF BOTH SHOULDERS: Status: ACTIVE | Noted: 2023-10-04

## 2023-10-04 PROBLEM — M12.812 ROTATOR CUFF ARTHROPATHY OF BOTH SHOULDERS: Status: ACTIVE | Noted: 2023-10-04

## 2023-10-04 LAB
ANION GAP SERPL CALC-SCNC: 11 MMOL/L (ref 10–20)
ANION GAP SERPL CALC-SCNC: 11 MMOL/L (ref 10–20)
ANION GAP SERPL CALC-SCNC: 12 MMOL/L (ref 10–20)
BUN SERPL-MCNC: 12 MG/DL (ref 6–23)
BUN SERPL-MCNC: 13 MG/DL (ref 6–23)
BUN SERPL-MCNC: 14 MG/DL (ref 6–23)
CALCIUM SERPL-MCNC: 8.8 MG/DL (ref 8.6–10.3)
CALCIUM SERPL-MCNC: 9.1 MG/DL (ref 8.6–10.3)
CALCIUM SERPL-MCNC: 9.2 MG/DL (ref 8.6–10.3)
CHLORIDE SERPL-SCNC: 94 MMOL/L (ref 98–107)
CHLORIDE SERPL-SCNC: 97 MMOL/L (ref 98–107)
CHLORIDE SERPL-SCNC: 97 MMOL/L (ref 98–107)
CO2 SERPL-SCNC: 20 MMOL/L (ref 21–32)
CO2 SERPL-SCNC: 21 MMOL/L (ref 21–32)
CO2 SERPL-SCNC: 21 MMOL/L (ref 21–32)
CREAT SERPL-MCNC: 0.74 MG/DL (ref 0.5–1.05)
CREAT SERPL-MCNC: 0.85 MG/DL (ref 0.5–1.05)
CREAT SERPL-MCNC: 0.85 MG/DL (ref 0.5–1.05)
CREAT UR-MCNC: 44.4 MG/DL (ref 20–320)
ERYTHROCYTE [DISTWIDTH] IN BLOOD BY AUTOMATED COUNT: 14.8 % (ref 11.5–14.5)
GFR SERPL CREATININE-BSD FRML MDRD: 65 ML/MIN/1.73M*2
GFR SERPL CREATININE-BSD FRML MDRD: 65 ML/MIN/1.73M*2
GFR SERPL CREATININE-BSD FRML MDRD: 76 ML/MIN/1.73M*2
GLUCOSE SERPL-MCNC: 80 MG/DL (ref 74–99)
GLUCOSE SERPL-MCNC: 86 MG/DL (ref 74–99)
GLUCOSE SERPL-MCNC: 99 MG/DL (ref 74–99)
HCT VFR BLD AUTO: 31.6 % (ref 36–46)
HGB BLD-MCNC: 10.5 G/DL (ref 12–16)
MCH RBC QN AUTO: 33 PG (ref 26–34)
MCHC RBC AUTO-ENTMCNC: 33.2 G/DL (ref 32–36)
MCV RBC AUTO: 99 FL (ref 80–100)
NRBC BLD-RTO: 0 /100 WBCS (ref 0–0)
OSMOLALITY SERPL: 259 MOSM/KG (ref 280–300)
OSMOLALITY SERPL: 262 MOSM/KG (ref 280–300)
OSMOLALITY UR: 419 MOSM/KG (ref 200–1200)
PLATELET # BLD AUTO: 355 X10*3/UL (ref 150–450)
PMV BLD AUTO: 8.7 FL (ref 7.5–11.5)
POTASSIUM SERPL-SCNC: 4.4 MMOL/L (ref 3.5–5.3)
POTASSIUM SERPL-SCNC: 4.6 MMOL/L (ref 3.5–5.3)
POTASSIUM SERPL-SCNC: 4.7 MMOL/L (ref 3.5–5.3)
RBC # BLD AUTO: 3.18 X10*6/UL (ref 4–5.2)
SODIUM SERPL-SCNC: 121 MMOL/L (ref 136–145)
SODIUM SERPL-SCNC: 124 MMOL/L (ref 136–145)
SODIUM SERPL-SCNC: 125 MMOL/L (ref 136–145)
SODIUM UR-SCNC: 117 MMOL/L
SODIUM/CREAT UR-RTO: 264 MMOL/G CREAT
TSH SERPL-ACNC: 1.75 MIU/L (ref 0.44–3.98)
URATE SERPL-MCNC: 3.9 MG/DL (ref 2.3–6.7)
WBC # BLD AUTO: 5.4 X10*3/UL (ref 4.4–11.3)

## 2023-10-04 PROCEDURE — 2500000004 HC RX 250 GENERAL PHARMACY W/ HCPCS (ALT 636 FOR OP/ED): Performed by: HOSPITALIST

## 2023-10-04 PROCEDURE — 83935 ASSAY OF URINE OSMOLALITY: CPT | Mod: AHULAB,CMCLAB | Performed by: INTERNAL MEDICINE

## 2023-10-04 PROCEDURE — 85027 COMPLETE CBC AUTOMATED: CPT | Performed by: HOSPITALIST

## 2023-10-04 PROCEDURE — 83930 ASSAY OF BLOOD OSMOLALITY: CPT | Mod: AHULAB,CMCLAB | Performed by: HOSPITALIST

## 2023-10-04 PROCEDURE — 84300 ASSAY OF URINE SODIUM: CPT | Mod: AHULAB,CMCLAB | Performed by: INTERNAL MEDICINE

## 2023-10-04 PROCEDURE — 36415 COLL VENOUS BLD VENIPUNCTURE: CPT | Mod: AHULAB,CMCLAB | Performed by: HOSPITALIST

## 2023-10-04 PROCEDURE — 2500000001 HC RX 250 WO HCPCS SELF ADMINISTERED DRUGS (ALT 637 FOR MEDICARE OP): Performed by: INTERNAL MEDICINE

## 2023-10-04 PROCEDURE — 83930 ASSAY OF BLOOD OSMOLALITY: CPT | Mod: AHULAB,CMCLAB | Performed by: INTERNAL MEDICINE

## 2023-10-04 PROCEDURE — 80048 BASIC METABOLIC PNL TOTAL CA: CPT | Performed by: HOSPITALIST

## 2023-10-04 PROCEDURE — 99232 SBSQ HOSP IP/OBS MODERATE 35: CPT | Performed by: INTERNAL MEDICINE

## 2023-10-04 PROCEDURE — 80048 BASIC METABOLIC PNL TOTAL CA: CPT | Performed by: INTERNAL MEDICINE

## 2023-10-04 PROCEDURE — 2500000001 HC RX 250 WO HCPCS SELF ADMINISTERED DRUGS (ALT 637 FOR MEDICARE OP): Performed by: HOSPITALIST

## 2023-10-04 PROCEDURE — 1200000002 HC GENERAL ROOM WITH TELEMETRY DAILY

## 2023-10-04 PROCEDURE — 36415 COLL VENOUS BLD VENIPUNCTURE: CPT | Performed by: HOSPITALIST

## 2023-10-04 PROCEDURE — 84550 ASSAY OF BLOOD/URIC ACID: CPT | Performed by: INTERNAL MEDICINE

## 2023-10-04 PROCEDURE — 84443 ASSAY THYROID STIM HORMONE: CPT | Performed by: INTERNAL MEDICINE

## 2023-10-04 RX ORDER — OXYCODONE HYDROCHLORIDE 5 MG/1
2.5 TABLET ORAL EVERY 6 HOURS PRN
Status: DISCONTINUED | OUTPATIENT
Start: 2023-10-04 | End: 2023-10-09 | Stop reason: HOSPADM

## 2023-10-04 RX ORDER — BUTALBITAL, ACETAMINOPHEN AND CAFFEINE 50; 325; 40 MG/1; MG/1; MG/1
1 TABLET ORAL 2 TIMES DAILY PRN
Status: DISCONTINUED | OUTPATIENT
Start: 2023-10-04 | End: 2023-10-09 | Stop reason: HOSPADM

## 2023-10-04 RX ORDER — ACETAMINOPHEN 325 MG/1
975 TABLET ORAL EVERY 8 HOURS
Status: DISCONTINUED | OUTPATIENT
Start: 2023-10-04 | End: 2023-10-09 | Stop reason: HOSPADM

## 2023-10-04 RX ORDER — ACETAMINOPHEN 325 MG/1
650 TABLET ORAL EVERY 6 HOURS
Status: DISCONTINUED | OUTPATIENT
Start: 2023-10-04 | End: 2023-10-04

## 2023-10-04 RX ADMIN — APIXABAN 5 MG: 5 TABLET, FILM COATED ORAL at 10:05

## 2023-10-04 RX ADMIN — OXYCODONE HYDROCHLORIDE 2.5 MG: 5 TABLET ORAL at 18:19

## 2023-10-04 RX ADMIN — CYANOCOBALAMIN TAB 500 MCG 500 MCG: 500 TAB at 10:04

## 2023-10-04 RX ADMIN — APIXABAN 5 MG: 5 TABLET, FILM COATED ORAL at 00:56

## 2023-10-04 RX ADMIN — Medication 25 MCG: at 10:05

## 2023-10-04 RX ADMIN — ACETAMINOPHEN 650 MG: 325 TABLET, FILM COATED ORAL at 06:27

## 2023-10-04 RX ADMIN — SENNOSIDES 17.2 MG: 8.6 TABLET, FILM COATED ORAL at 21:33

## 2023-10-04 RX ADMIN — SENNOSIDES 17.2 MG: 8.6 TABLET, FILM COATED ORAL at 10:05

## 2023-10-04 RX ADMIN — ACETAMINOPHEN 975 MG: 325 TABLET ORAL at 14:44

## 2023-10-04 RX ADMIN — APIXABAN 5 MG: 5 TABLET, FILM COATED ORAL at 21:34

## 2023-10-04 RX ADMIN — ACETAMINOPHEN 650 MG: 325 TABLET, FILM COATED ORAL at 00:57

## 2023-10-04 RX ADMIN — SENNOSIDES 17.2 MG: 8.6 TABLET, FILM COATED ORAL at 00:56

## 2023-10-04 RX ADMIN — SODIUM CHLORIDE 50 ML/HR: 9 INJECTION, SOLUTION INTRAVENOUS at 00:58

## 2023-10-04 SDOH — ECONOMIC STABILITY: TRANSPORTATION INSECURITY
IN THE PAST 12 MONTHS, HAS THE LACK OF TRANSPORTATION KEPT YOU FROM MEDICAL APPOINTMENTS OR FROM GETTING MEDICATIONS?: NO

## 2023-10-04 SDOH — SOCIAL STABILITY: SOCIAL INSECURITY: DO YOU FEEL UNSAFE GOING BACK TO THE PLACE WHERE YOU ARE LIVING?: NO

## 2023-10-04 SDOH — SOCIAL STABILITY: SOCIAL INSECURITY: DOES ANYONE TRY TO KEEP YOU FROM HAVING/CONTACTING OTHER FRIENDS OR DOING THINGS OUTSIDE YOUR HOME?: NO

## 2023-10-04 SDOH — SOCIAL STABILITY: SOCIAL INSECURITY: ARE THERE ANY APPARENT SIGNS OF INJURIES/BEHAVIORS THAT COULD BE RELATED TO ABUSE/NEGLECT?: NO

## 2023-10-04 SDOH — ECONOMIC STABILITY: INCOME INSECURITY: IN THE LAST 12 MONTHS, WAS THERE A TIME WHEN YOU WERE NOT ABLE TO PAY THE MORTGAGE OR RENT ON TIME?: PATIENT REFUSED

## 2023-10-04 SDOH — SOCIAL STABILITY: SOCIAL INSECURITY: DO YOU FEEL ANYONE HAS EXPLOITED OR TAKEN ADVANTAGE OF YOU FINANCIALLY OR OF YOUR PERSONAL PROPERTY?: NO

## 2023-10-04 SDOH — ECONOMIC STABILITY: TRANSPORTATION INSECURITY
IN THE PAST 12 MONTHS, HAS LACK OF TRANSPORTATION KEPT YOU FROM MEETINGS, WORK, OR FROM GETTING THINGS NEEDED FOR DAILY LIVING?: NO

## 2023-10-04 SDOH — ECONOMIC STABILITY: HOUSING INSECURITY: IN THE LAST 12 MONTHS, HOW MANY PLACES HAVE YOU LIVED?: 1

## 2023-10-04 SDOH — SOCIAL STABILITY: SOCIAL INSECURITY: ARE YOU OR HAVE YOU BEEN THREATENED OR ABUSED PHYSICALLY, EMOTIONALLY, OR SEXUALLY BY ANYONE?: NO

## 2023-10-04 SDOH — ECONOMIC STABILITY: HOUSING INSECURITY
IN THE LAST 12 MONTHS, WAS THERE A TIME WHEN YOU DID NOT HAVE A STEADY PLACE TO SLEEP OR SLEPT IN A SHELTER (INCLUDING NOW)?: PATIENT REFUSED

## 2023-10-04 SDOH — SOCIAL STABILITY: SOCIAL INSECURITY: ABUSE: ADULT

## 2023-10-04 SDOH — SOCIAL STABILITY: SOCIAL INSECURITY: HAVE YOU HAD THOUGHTS OF HARMING ANYONE ELSE?: NO

## 2023-10-04 SDOH — ECONOMIC STABILITY: INCOME INSECURITY: HOW HARD IS IT FOR YOU TO PAY FOR THE VERY BASICS LIKE FOOD, HOUSING, MEDICAL CARE, AND HEATING?: PATIENT DECLINED

## 2023-10-04 SDOH — SOCIAL STABILITY: SOCIAL INSECURITY: HAS ANYONE EVER THREATENED TO HURT YOUR FAMILY OR YOUR PETS?: NO

## 2023-10-04 ASSESSMENT — COGNITIVE AND FUNCTIONAL STATUS - GENERAL
PERSONAL GROOMING: A LOT
DAILY ACTIVITIY SCORE: 18
DAILY ACTIVITIY SCORE: 16
STANDING UP FROM CHAIR USING ARMS: A LITTLE
TOILETING: A LOT
EATING MEALS: A LITTLE
PERSONAL GROOMING: A LITTLE
HELP NEEDED FOR BATHING: A LOT
TOILETING: A LITTLE
HELP NEEDED FOR BATHING: A LITTLE
MOBILITY SCORE: 12
TURNING FROM BACK TO SIDE WHILE IN FLAT BAD: A LOT
DRESSING REGULAR UPPER BODY CLOTHING: A LOT
PATIENT BASELINE BEDBOUND: NO
MOVING TO AND FROM BED TO CHAIR: A LITTLE
WALKING IN HOSPITAL ROOM: A LOT
WALKING IN HOSPITAL ROOM: A LOT
CLIMB 3 TO 5 STEPS WITH RAILING: A LOT
MOBILITY SCORE: 17
MOVING FROM LYING ON BACK TO SITTING ON SIDE OF FLAT BED WITH BEDRAILS: A LITTLE
DRESSING REGULAR LOWER BODY CLOTHING: A LOT
STANDING UP FROM CHAIR USING ARMS: A LOT
DRESSING REGULAR LOWER BODY CLOTHING: A LOT
PERSONAL GROOMING: A LITTLE
DRESSING REGULAR UPPER BODY CLOTHING: A LOT
MOBILITY SCORE: 14
TOILETING: A LOT
DRESSING REGULAR LOWER BODY CLOTHING: A LOT
MOVING FROM LYING ON BACK TO SITTING ON SIDE OF FLAT BED WITH BEDRAILS: A LOT
MOVING TO AND FROM BED TO CHAIR: A LOT
DAILY ACTIVITIY SCORE: 13
MOVING TO AND FROM BED TO CHAIR: A LOT
STANDING UP FROM CHAIR USING ARMS: A LOT
DRESSING REGULAR UPPER BODY CLOTHING: A LITTLE
TURNING FROM BACK TO SIDE WHILE IN FLAT BAD: A LITTLE
CLIMB 3 TO 5 STEPS WITH RAILING: A LOT
CLIMB 3 TO 5 STEPS WITH RAILING: A LOT
HELP NEEDED FOR BATHING: A LITTLE
TURNING FROM BACK TO SIDE WHILE IN FLAT BAD: A LITTLE
WALKING IN HOSPITAL ROOM: A LOT

## 2023-10-04 ASSESSMENT — LIFESTYLE VARIABLES
HOW OFTEN DURING THE LAST YEAR HAVE YOU HAD A FEELING OF GUILT OR REMORSE AFTER DRINKING: NEVER
HAS A RELATIVE, FRIEND, DOCTOR, OR ANOTHER HEALTH PROFESSIONAL EXPRESSED CONCERN ABOUT YOUR DRINKING OR SUGGESTED YOU CUT DOWN: NO
SKIP TO QUESTIONS 9-10: 1
HOW OFTEN DURING THE LAST YEAR HAVE YOU BEEN UNABLE TO REMEMBER WHAT HAPPENED THE NIGHT BEFORE BECAUSE YOU HAD BEEN DRINKING: NEVER
HOW OFTEN DURING THE LAST YEAR HAVE YOU FAILED TO DO WHAT WAS NORMALLY EXPECTED FROM YOU BECAUSE OF DRINKING: NEVER
HOW MANY STANDARD DRINKS CONTAINING ALCOHOL DO YOU HAVE ON A TYPICAL DAY: 1 OR 2
SUBSTANCE_ABUSE_PAST_12_MONTHS: NO
AUDIT-C TOTAL SCORE: 4
HOW OFTEN DO YOU HAVE A DRINK CONTAINING ALCOHOL: 4 OR MORE TIMES A WEEK
HAVE YOU OR SOMEONE ELSE BEEN INJURED AS A RESULT OF YOUR DRINKING: NO
HOW OFTEN DO YOU HAVE 6 OR MORE DRINKS ON ONE OCCASION: NEVER
AUDIT TOTAL SCORE: 4
HOW OFTEN DURING THE LAST YEAR HAVE YOU FOUND THAT YOU WERE NOT ABLE TO STOP DRINKING ONCE YOU HAD STARTED: NEVER
AUDIT-C TOTAL SCORE: 4
PRESCIPTION_ABUSE_PAST_12_MONTHS: NO
HOW OFTEN DURING THE LAST YEAR HAVE YOU NEEDED AN ALCOHOLIC DRINK FIRST THING IN THE MORNING TO GET YOURSELF GOING AFTER A NIGHT OF HEAVY DRINKING: NEVER

## 2023-10-04 ASSESSMENT — PAIN - FUNCTIONAL ASSESSMENT
PAIN_FUNCTIONAL_ASSESSMENT: 0-10

## 2023-10-04 ASSESSMENT — ACTIVITIES OF DAILY LIVING (ADL)
GROOMING: NEEDS ASSISTANCE
WALKS IN HOME: NEEDS ASSISTANCE
TOILETING: NEEDS ASSISTANCE
DRESSING YOURSELF: NEEDS ASSISTANCE
HEARING - LEFT EAR: HEARING AID
ADEQUATE_TO_COMPLETE_ADL: YES
HEARING - RIGHT EAR: HEARING AID
PATIENT'S MEMORY ADEQUATE TO SAFELY COMPLETE DAILY ACTIVITIES?: YES
JUDGMENT_ADEQUATE_SAFELY_COMPLETE_DAILY_ACTIVITIES: YES
ASSISTIVE_DEVICE: WALKER
FEEDING YOURSELF: INDEPENDENT
BATHING: NEEDS ASSISTANCE

## 2023-10-04 ASSESSMENT — PAIN SCALES - PAIN ASSESSMENT IN ADVANCED DEMENTIA (PAINAD)
BODYLANGUAGE: RELAXED
CONSOLABILITY: NO NEED TO CONSOLE

## 2023-10-04 ASSESSMENT — PATIENT HEALTH QUESTIONNAIRE - PHQ9
2. FEELING DOWN, DEPRESSED OR HOPELESS: SEVERAL DAYS
1. LITTLE INTEREST OR PLEASURE IN DOING THINGS: NOT AT ALL
SUM OF ALL RESPONSES TO PHQ9 QUESTIONS 1 & 2: 1

## 2023-10-04 ASSESSMENT — PAIN SCALES - GENERAL
PAINLEVEL_OUTOF10: 0 - NO PAIN
PAINLEVEL_OUTOF10: 3
PAINLEVEL_OUTOF10: 9
PAINLEVEL_OUTOF10: 0 - NO PAIN
PAINLEVEL_OUTOF10: 0 - NO PAIN
PAINLEVEL_OUTOF10: 5 - MODERATE PAIN

## 2023-10-04 NOTE — CONSULTS
CONSULT: NEPHROLOGY SERVICE    REASON FOR CONSULT: Hyponatremia   REQUESTING PHYSICIAN: Fady  Admit Date: 10/3/2023 11:25 AM      ASSESSMENT AND PLAN:  - Hyponatremia: chronic for many years, no formal diagnosis I can see, also no urine studies available. Possible SIADH vs reset osmostat background   Pt told to watch her fluids, before was on salt tab  Perhaps mildly symptomatic on arrival, appears euvolemic  Acute droop in Na from 129 a few days ago to 118 on arrival, 124 this am, had small NS bolus -somewhat hypovolemic on arrival??-, also with nausea for several days that can worsen her hyponatremia  Normal TSH a few months ago    PLAN:  - FR 1.2L/day, no additional ivf for now, labs later today and tomorrow  - getting Mariam, Uosm, serum osm, uric acid      HPI: Patient is a 89 y.o. female admitted 10/3/2023 with h/o chronic hyponatremia, HTN, who was recently dc after PE, AF who was sent from nursing facility because of hyponatermia. Pt c/o newer onset headache and also worsening recurrent nausea for the last few days. She states is drinking little fluids. Na on admission was 118, Na on 9/25 was at usual baseline of 129. Had another CTA of chest yesterday w/o acute findings. Pt had NS bolus last night, unclear amount.   Better from nausea this am    Past Medical History:   Diagnosis Date    Anterior displaced type ii dens fracture, sequela 06/25/2019    Odontoid fracture, sequela    Encounter for general adult medical examination without abnormal findings 07/19/2019    Medicare annual wellness visit, initial    Localized edema 05/28/2020    Bilateral edema of lower extremity    Multiple fractures of pelvis with stable disruption of pelvic ring, initial encounter for closed fracture (CMS/[facility]) 04/01/2019    Closed pelvic ring fracture, initial encounter    Pain in left shoulder 01/08/2019    Left shoulder pain    Personal history of other diseases of the musculoskeletal system and connective tissue 07/19/2019     History of low back pain    Personal history of other diseases of the musculoskeletal system and connective tissue 06/24/2019    History of neck pain    Primary osteoarthritis, right shoulder 07/19/2019    DJD of right shoulder    Segmental and somatic dysfunction of abdomen and other regions 11/03/2017    Somatic dysfunction of back    Stiffness of left shoulder, not elsewhere classified 01/08/2019    Stiffness of left shoulder, not elsewhere classified    Unilateral primary osteoarthritis, right knee 08/14/2017    Right knee DJD    Unspecified displaced fracture of second cervical vertebra, initial encounter for closed fracture (CMS/[facility]) 03/01/2019    Closed odontoid fracture, initial encounter    Unspecified displaced fracture of second cervical vertebra, subsequent encounter for fracture with routine healing 07/19/2019    Closed odontoid fracture with routine healing, subsequent encounter    Unspecified symptoms and signs involving the genitourinary system     UTI symptoms    Urinary tract infection, site not specified     Frequent UTI     Allergies: Patient has no known allergies.     Past Surgical History:   Procedure Laterality Date    CT NECK ANGIO W AND WO IV CONTRAST  1/24/2019    CT NECK ANGIO W AND WO IV CONTRAST 1/24/2019 [facility] CLINICAL LEGACY    MR HEAD ANGIO WO IV CONTRAST  8/13/2016    MR HEAD ANGIO WO IV CONTRAST 8/13/2016 [facility]    MR NECK ANGIO WO IV CONTRAST  8/13/2016    MR NECK ANGIO WO IV CONTRAST 8/13/2016 [facility] EMERGENCY LEGACY    OTHER SURGICAL HISTORY  07/01/2019    No history of surgery       Family History   Problem Relation Name Age of Onset    Aortic aneurysm Father         Social History  She reports that she quit smoking about 61 years ago. Her smoking use included cigarettes. She has a 10.00 pack-year smoking history. She has never used smokeless tobacco. No history on file for alcohol use and drug use.    Review of Systems  As above     CURRENT HOSP MEDS:    Current  "Facility-Administered Medications:     acetaminophen (Tylenol) tablet 650 mg, 650 mg, oral, q6h, Omaira Shea MD, 650 mg at 10/04/23 0627    apixaban (Eliquis) tablet 5 mg, 5 mg, oral, BID, Omaira Shea MD, 5 mg at 10/04/23 1005    bisacodyl (Dulcolax) suppository 10 mg, 10 mg, rectal, Once PRN, Omaira Shea MD    cholecalciferol (Vitamin D-3) tablet 25 mcg, 25 mcg, oral, Daily, Omaira Shea MD, 25 mcg at 10/04/23 1005    cyanocobalamin (Vitamin B-12) tablet 500 mcg, 500 mcg, oral, Daily, Omaira Shea MD, 500 mcg at 10/04/23 1004    dicyclomine (Bentyl) capsule 10 mg, 10 mg, oral, q6h PRN, Omaira Shea MD    [Held by provider] lisinopril tablet 10 mg, 10 mg, oral, Daily, Omaira Shea MD    magnesium hydroxide (Milk of Magnesia) 2,400 mg/10 mL suspension 30 mL, 30 mL, oral, Daily PRN, Omaira Shea MD    sennosides (Senokot) tablet 17.2 mg, 2 tablet, oral, BID, Omaira Shea MD, 17.2 mg at 10/04/23 1005    sodium phosphates (Fleets) 19-7 gram/118 mL enema 1 enema, 1 enema, rectal, Daily PRN, Omaira Shea MD     PHYSICAL EXAM:  /69 (BP Location: Left arm, Patient Position: Lying)   Pulse 75   Temp 36 °C (96.8 °F) (Oral)   Resp 18   Ht 1.549 m (5' 1\")   Wt 56 kg (123 lb 7.3 oz)   SpO2 97%   BMI 23.33 kg/m²     Intake/Output Summary (Last 24 hours) at 10/4/2023 1052  Last data filed at 10/3/2023 1900  Gross per 24 hour   Intake --   Output 1100 ml   Net -1100 ml     Gen: AAO, NAD, thin, frail  Neck: No JVD  Cardiac: RRR  Resp: clear BS  Abd: Soft, non tender, +BS, non distended   Ext: No edema   Neuro: moves 4 ext  Peripheral Pulses: Capillary refill <2secs, strong peripheral pulses.  Skin: Skin color, texture, turgor normal, no suspicious rashes or lesions.    LABS:   Results for orders placed or performed during the hospital encounter of 10/03/23 (from the past 24 hour(s))   CBC   Result Value Ref Range    WBC 4.6 4.4 - 11.3 x10*3/uL    nRBC 0.0 0.0 - 0.0 /100 WBCs    RBC 3.03 (L) 4.00 - " 5.20 x10*6/uL    Hemoglobin 10.2 (L) 12.0 - 16.0 g/dL    Hematocrit 29.9 (L) 36.0 - 46.0 %    MCV 99 80 - 100 fL    MCH 33.7 26.0 - 34.0 pg    MCHC 34.1 32.0 - 36.0 g/dL    RDW 14.6 (H) 11.5 - 14.5 %    Platelets 381 150 - 450 x10*3/uL    MPV 9.1 7.5 - 11.5 fL   Comprehensive metabolic panel   Result Value Ref Range    Glucose 81 74 - 99 mg/dL    Sodium 118 (LL) 136 - 145 mmol/L    Potassium 5.2 3.5 - 5.3 mmol/L    Chloride 89 (L) 98 - 107 mmol/L    Bicarbonate 23 21 - 32 mmol/L    Anion Gap 11 10 - 20 mmol/L    Urea Nitrogen 18 6 - 23 mg/dL    Creatinine 0.99 0.50 - 1.05 mg/dL    eGFR 54 (L) >60 mL/min/1.73m*2    Calcium 9.3 8.6 - 10.3 mg/dL    Albumin 3.7 3.4 - 5.0 g/dL    Alkaline Phosphatase 81 33 - 136 U/L    Total Protein 5.8 (L) 6.4 - 8.2 g/dL    AST 23 9 - 39 U/L    Bilirubin, Total 0.6 0.0 - 1.2 mg/dL    ALT 24 7 - 45 U/L   Protime-INR   Result Value Ref Range    Protime 16.1 (H) 9.8 - 12.8 seconds    INR 1.4 (H) 0.9 - 1.1   Troponin I, High Sensitivity   Result Value Ref Range    Troponin I, High Sensitivity 8 0 - 13 ng/L   Magnesium   Result Value Ref Range    Magnesium 1.80 1.60 - 2.40 mg/dL   Troponin I, High Sensitivity   Result Value Ref Range    Troponin I, High Sensitivity 9 0 - 13 ng/L   CBC   Result Value Ref Range    WBC 5.4 4.4 - 11.3 x10*3/uL    nRBC 0.0 0.0 - 0.0 /100 WBCs    RBC 3.18 (L) 4.00 - 5.20 x10*6/uL    Hemoglobin 10.5 (L) 12.0 - 16.0 g/dL    Hematocrit 31.6 (L) 36.0 - 46.0 %    MCV 99 80 - 100 fL    MCH 33.0 26.0 - 34.0 pg    MCHC 33.2 32.0 - 36.0 g/dL    RDW 14.8 (H) 11.5 - 14.5 %    Platelets 355 150 - 450 x10*3/uL    MPV 8.7 7.5 - 11.5 fL   Basic metabolic panel   Result Value Ref Range    Glucose 86 74 - 99 mg/dL    Sodium 121 (L) 136 - 145 mmol/L    Potassium 4.7 3.5 - 5.3 mmol/L    Chloride 94 (L) 98 - 107 mmol/L    Bicarbonate 21 21 - 32 mmol/L    Anion Gap 11 10 - 20 mmol/L    Urea Nitrogen 14 6 - 23 mg/dL    Creatinine 0.85 0.50 - 1.05 mg/dL    eGFR 65 >60 mL/min/1.73m*2     Calcium 9.2 8.6 - 10.3 mg/dL   Basic metabolic panel   Result Value Ref Range    Glucose 80 74 - 99 mg/dL    Sodium 124 (L) 136 - 145 mmol/L    Potassium 4.4 3.5 - 5.3 mmol/L    Chloride 97 (L) 98 - 107 mmol/L    Bicarbonate 20 (L) 21 - 32 mmol/L    Anion Gap 11 10 - 20 mmol/L    Urea Nitrogen 12 6 - 23 mg/dL    Creatinine 0.74 0.50 - 1.05 mg/dL    eGFR 76 >60 mL/min/1.73m*2    Calcium 9.1 8.6 - 10.3 mg/dL       DATA:   Diagnostic tests reviewed for today's visit:    Labs and meds    Greatly appreciate the opportunity to assist in the care of this patient. Will continue to follow.   Time spent on consult: 60 min    Signature: Mauricio Preston MD

## 2023-10-04 NOTE — PROGRESS NOTES
"Cassia Humphries is a 89 y.o. female on day 1 of admission presenting with Hyponatremia.    Subjective   Right shoulder/chest has been painful for the past week at SNF; there is bruising in the area.        Objective     Physical Exam  Cardiovascular:      Rate and Rhythm: Normal rate and regular rhythm.      Heart sounds: Normal heart sounds.   Pulmonary:      Breath sounds: Normal breath sounds.   Abdominal:      General: Bowel sounds are normal.      Palpations: Abdomen is soft.   Musculoskeletal:         General: Normal range of motion.   Skin:     Comments: Bruising by right chest/shoulder   Neurological:      General: No focal deficit present.      Mental Status: She is alert and oriented to person, place, and time.      Comments: Unable to lift both arms up fully   Psychiatric:         Mood and Affect: Mood normal.         Last Recorded Vitals  Blood pressure 133/69, pulse 75, temperature 36 °C (96.8 °F), temperature source Oral, resp. rate 18, height 1.549 m (5' 1\"), weight 56 kg (123 lb 7.3 oz), SpO2 97 %.  Intake/Output last 3 Shifts:  I/O last 3 completed shifts:  In: - (0 mL/kg)   Out: 1100 (19.6 mL/kg) [Urine:1100 (0.5 mL/kg/hr)]  Weight: 56 kg     Relevant Results                         Assessment/Plan   Principal Problem:    Hyponatremia  Active Problems:    Anemia    Benign essential hypertension    Osteoarthritis of knee    Other chronic pain    VTE (venous thromboembolism)    Rotator cuff arthropathy of both shoulders  - acute on chronic hypoNa  - stop cymbalta  - fluid restrict; trend Na  - PT; may need back to SNF  - dc in 1-2 days  - low dose oxy for pain; scheduled tylenol  - seemed to have some trauma to right shoulder/chest that led to bruising; nothing concerning on CT scan  - hx afib? Will monitor tele         I spent 45 minutes in the professional and overall care of this patient.      Alex Shrestha MD      "

## 2023-10-04 NOTE — PROGRESS NOTES
10/04/2023 1626  Patient's daughter knocking at [facility] door, with inquiry for any updates on discharge plan. She was requesting if patient will be seen by PT/OT this evening. She has been asked by [facility] to make a decision to keep the patient's bed or release it.      Daughter endorses she has recently spoken with Facility prior to speaking with me.  [facility] informed that no decision for discharge was made for today.  Sodium level 124 today. [facility] will update covering [facility] to place patient on PT/OT priority list in the morning. Daughter made reference to the expensive cost of staying at [facility].  [facility] offered alternate options which were less expensive , Daughter declined, stating her mother was there before and is familiar with the people. [facility] following for Discharge needs.  Danielle BINGHAM RN [facility] CCM

## 2023-10-04 NOTE — CARE PLAN
The patient's goals for the shift include      The clinical goals for the shift include pain management    Over the shift, the patient did make progress toward the following goals. Will continue to monitor for comfort and safety.

## 2023-10-04 NOTE — PROGRESS NOTES
Care Coordinator Note:  Met with patient and her daughter, Charlette, at the bedside to discuss discharge plan.  Patient has been at [facility] since 9/2 and they would like to return there when medically cleared for discharge.    She uses a walker since 2019 and has a chair lift at home along with safety grab bars.  PT/OT has been ordered.  Patient admitted for hyponatremia.  Nephrology consulted.  Etelvina Bardales RN [facility]

## 2023-10-05 LAB
ALBUMIN SERPL BCP-MCNC: 3.2 G/DL (ref 3.4–5)
ANION GAP SERPL CALC-SCNC: 12 MMOL/L (ref 10–20)
BUN SERPL-MCNC: 14 MG/DL (ref 6–23)
CALCIUM SERPL-MCNC: 8.8 MG/DL (ref 8.6–10.3)
CHLORIDE SERPL-SCNC: 98 MMOL/L (ref 98–107)
CO2 SERPL-SCNC: 19 MMOL/L (ref 21–32)
CREAT SERPL-MCNC: 0.74 MG/DL (ref 0.5–1.05)
GFR SERPL CREATININE-BSD FRML MDRD: 76 ML/MIN/1.73M*2
GLUCOSE SERPL-MCNC: 79 MG/DL (ref 74–99)
PHOSPHATE SERPL-MCNC: 3.2 MG/DL (ref 2.5–4.9)
POTASSIUM SERPL-SCNC: 4.5 MMOL/L (ref 3.5–5.3)
SODIUM SERPL-SCNC: 124 MMOL/L (ref 136–145)

## 2023-10-05 PROCEDURE — 2500000001 HC RX 250 WO HCPCS SELF ADMINISTERED DRUGS (ALT 637 FOR MEDICARE OP): Performed by: HOSPITALIST

## 2023-10-05 PROCEDURE — 97530 THERAPEUTIC ACTIVITIES: CPT | Mod: GO,59

## 2023-10-05 PROCEDURE — 2500000001 HC RX 250 WO HCPCS SELF ADMINISTERED DRUGS (ALT 637 FOR MEDICARE OP): Performed by: INTERNAL MEDICINE

## 2023-10-05 PROCEDURE — 97535 SELF CARE MNGMENT TRAINING: CPT | Mod: GO

## 2023-10-05 PROCEDURE — 99232 SBSQ HOSP IP/OBS MODERATE 35: CPT | Performed by: INTERNAL MEDICINE

## 2023-10-05 PROCEDURE — 80069 RENAL FUNCTION PANEL: CPT | Performed by: INTERNAL MEDICINE

## 2023-10-05 PROCEDURE — 97161 PT EVAL LOW COMPLEX 20 MIN: CPT | Mod: GP

## 2023-10-05 PROCEDURE — 97165 OT EVAL LOW COMPLEX 30 MIN: CPT | Mod: GO

## 2023-10-05 PROCEDURE — 36415 COLL VENOUS BLD VENIPUNCTURE: CPT | Performed by: INTERNAL MEDICINE

## 2023-10-05 PROCEDURE — 1200000002 HC GENERAL ROOM WITH TELEMETRY DAILY

## 2023-10-05 RX ORDER — SODIUM CHLORIDE 1000 MG
1000 TABLET, SOLUBLE MISCELLANEOUS
Status: DISCONTINUED | OUTPATIENT
Start: 2023-10-05 | End: 2023-10-08

## 2023-10-05 RX ADMIN — SENNOSIDES 17.2 MG: 8.6 TABLET, FILM COATED ORAL at 09:14

## 2023-10-05 RX ADMIN — SODIUM CHLORIDE 1 G: 1 TABLET ORAL at 17:30

## 2023-10-05 RX ADMIN — APIXABAN 5 MG: 5 TABLET, FILM COATED ORAL at 20:47

## 2023-10-05 RX ADMIN — Medication 25 MCG: at 09:13

## 2023-10-05 RX ADMIN — OXYCODONE HYDROCHLORIDE 2.5 MG: 5 TABLET ORAL at 11:12

## 2023-10-05 RX ADMIN — SENNOSIDES 17.2 MG: 8.6 TABLET, FILM COATED ORAL at 20:47

## 2023-10-05 RX ADMIN — APIXABAN 5 MG: 5 TABLET, FILM COATED ORAL at 09:13

## 2023-10-05 RX ADMIN — CYANOCOBALAMIN TAB 500 MCG 500 MCG: 500 TAB at 09:13

## 2023-10-05 RX ADMIN — ACETAMINOPHEN 975 MG: 325 TABLET ORAL at 17:30

## 2023-10-05 RX ADMIN — SODIUM CHLORIDE 1 G: 1 TABLET ORAL at 11:12

## 2023-10-05 RX ADMIN — OXYCODONE HYDROCHLORIDE 2.5 MG: 5 TABLET ORAL at 21:33

## 2023-10-05 RX ADMIN — ACETAMINOPHEN 975 MG: 325 TABLET ORAL at 09:13

## 2023-10-05 RX ADMIN — ACETAMINOPHEN 975 MG: 325 TABLET ORAL at 01:53

## 2023-10-05 ASSESSMENT — PAIN SCALES - GENERAL
PAINLEVEL_OUTOF10: 2
PAINLEVEL_OUTOF10: 8
PAINLEVEL_OUTOF10: 3
PAINLEVEL_OUTOF10: 7
PAINLEVEL_OUTOF10: 3
PAINLEVEL_OUTOF10: 8
PAINLEVEL_OUTOF10: 8
PAINLEVEL_OUTOF10: 0 - NO PAIN

## 2023-10-05 ASSESSMENT — COGNITIVE AND FUNCTIONAL STATUS - GENERAL
TURNING FROM BACK TO SIDE WHILE IN FLAT BAD: A LITTLE
DRESSING REGULAR UPPER BODY CLOTHING: A LOT
DRESSING REGULAR LOWER BODY CLOTHING: TOTAL
PERSONAL GROOMING: A LITTLE
MOBILITY SCORE: 15
HELP NEEDED FOR BATHING: A LOT
EATING MEALS: A LITTLE
TURNING FROM BACK TO SIDE WHILE IN FLAT BAD: A LITTLE
DRESSING REGULAR UPPER BODY CLOTHING: A LOT
PERSONAL GROOMING: A LITTLE
HELP NEEDED FOR BATHING: A LOT
MOVING TO AND FROM BED TO CHAIR: A LOT
DAILY ACTIVITIY SCORE: 13
TOILETING: A LOT
TOILETING: A LOT
HELP NEEDED FOR BATHING: A LOT
MOVING TO AND FROM BED TO CHAIR: A LOT
MOBILITY SCORE: 14
DRESSING REGULAR UPPER BODY CLOTHING: A LOT
MOVING FROM LYING ON BACK TO SITTING ON SIDE OF FLAT BED WITH BEDRAILS: A LITTLE
PERSONAL GROOMING: A LITTLE
CLIMB 3 TO 5 STEPS WITH RAILING: A LOT
DAILY ACTIVITIY SCORE: 13
DAILY ACTIVITIY SCORE: 14
DRESSING REGULAR LOWER BODY CLOTHING: TOTAL
STANDING UP FROM CHAIR USING ARMS: A LOT
TOILETING: A LOT
DRESSING REGULAR LOWER BODY CLOTHING: TOTAL
WALKING IN HOSPITAL ROOM: A LITTLE
MOVING FROM LYING ON BACK TO SITTING ON SIDE OF FLAT BED WITH BEDRAILS: A LITTLE
STANDING UP FROM CHAIR USING ARMS: A LOT
WALKING IN HOSPITAL ROOM: A LOT
CLIMB 3 TO 5 STEPS WITH RAILING: A LOT
EATING MEALS: A LITTLE

## 2023-10-05 ASSESSMENT — PAIN DESCRIPTION - DESCRIPTORS: DESCRIPTORS: SHARP

## 2023-10-05 ASSESSMENT — ACTIVITIES OF DAILY LIVING (ADL)
BATHING_ASSISTANCE: MODERATE
HOME_MANAGEMENT_TIME_ENTRY: 10
ADL_ASSISTANCE: NEEDS ASSISTANCE
TOILETING_ASSISTANCE: STAND BY
GROOMING_ASSISTANCE: STAND BY
ADL_ASSISTANCE: NEEDS ASSISTANCE

## 2023-10-05 ASSESSMENT — PAIN - FUNCTIONAL ASSESSMENT
PAIN_FUNCTIONAL_ASSESSMENT: 0-10

## 2023-10-05 NOTE — CARE PLAN
Problem: Balance  Goal: Pt performs all sitting balance with IND and standing balance with min assist and appropriate assistive device  Outcome: Progressing     Problem: Mobility  Goal: STG - Patient will ambulate >50 ft with CGA or better and appropriate assistive device  Outcome: Progressing     Problem: Safety  Goal: LTG - Patient will demonstrate safety requirements appropriate to situation/environment  Outcome: Progressing     Problem: Transfers  Goal: STG - Patient to transfer to and from sit to supine with SBA  Outcome: Progressing  Goal: STG - Patient will transfer sit to and from stand with min assist x 1 and appropriate assistive device  Outcome: Progressing     Problem: Strength  Goal: Pt demonstrates BLE MMT grade of at least 4/5 in all MMT tested during initial PT evalaution  Outcome: Progressing

## 2023-10-05 NOTE — PROGRESS NOTES
Occupational Therapy    Evaluation/Treatment    Patient Name: Cassia Humphries  MRN: [id number]  : 1932  Today's Date: 10/05/23  Time Calculation  Start Time: 841  Stop Time: 915  Time Calculation (min): 34 min       Assessment:  OT Assessment: Pt. requires assistance d/t increased pain, unable to complete full stand, pt. able to scoot HOB with CGA. Unable to take steps, pt. would continue to benefit from skilled Ot session.  Prognosis: Fair  Evaluation/Treatment Tolerance: Patient limited by pain, Patient limited by fatigue  Medical Staff Made Aware: Yes  Prognosis: Fair  Evaluation/Treatment Tolerance: Patient limited by pain, Patient limited by fatigue  Medical Staff Made Aware: Yes  Plan:  Treatment Interventions: ADL retraining, Functional transfer training, Equipment evaluation/education  OT Frequency: 3 times per week  OT Discharge Recommendations: Moderate intensity level of continued care  Treatment Interventions: ADL retraining, Functional transfer training, Equipment evaluation/education    Subjective   Current Problem:  1. Hyponatremia          General:   OT Received On: 10/05/23  General  Reason for Referral: hyponatremia  Referred By: Alex Shrestha)  Past Medical History Relevant to Rehab: chronic hyponatremia, HTN, rectal bleed, depression, OA  Family/Caregiver Present: No  Prior to Session Communication: Bedside nurse, Charge Nurse  Patient Position Received: Bed, 3 rail up, Alarm on  Preferred Learning Style: verbal  General Comment: Pt. cleared for OT session, pt. agreeable to OT session  Precautions:  Hearing/Visual Limitations: [place]; L hearing aide  Medical Precautions: Fall precautions  Vital Signs:     Pain:  Pain Assessment  Pain Assessment: 0-10  Pain Score: 8  Pain Type: Chronic pain  Pain Location: Shoulder  Pain Orientation:  (B)  Pain Descriptors: Sharp  Pain Frequency: Constant/continuous    Objective   Cognition:  Overall Cognitive Status: Within Functional  "Limits  Attention:  (Pt. perseverating on saying \"too much\"and messing things up for her daughter.)  Insight: Mild           Home Living:  Type of Home: House ([facility] previously for rehab)  Lives With: Alone (for the past couple months, pt. daughter staying with pt.)  Home Adaptive Equipment: Walker rolling or standard, Cane (stair lift)  Home Layout: Two level  Bathroom Shower/Tub: Walk-in shower  Bathroom Toilet: Adaptive toilet seating (3:1)  Bathroom Equipment: Raised toilet seat with rails  Prior Function:  Level of [place]: Needs assistance with ADLs, Needs assistance with homemaking, Needs assistance with functional transfers  Receives Help From: Family, Personal care attendant  ADL Assistance: Needs assistance  Bath: Moderate  Toileting: Stand by  Dressing: Moderate  Grooming: Stand by  Hand Dominance: Right  IADL History:  Occupation: Volunteer work  ADL:  LE Dressing Assistance: Maximal  LE Dressing Deficit: Steadying, Supervision/safety, Increased time to complete, Thread RLE into pants, Thread LLE into pants, Pull up over hips  Activities of Daily Living: LE Dressing  LE Dressing: Yes  Pants Level of Assistance: Maximum assistance  LE Dressing Where Assessed: Edge of bed  Activity Tolerance:  Endurance: Decreased tolerance for upright activites  Functional Standing Tolerance:     Bed Mobility/Transfers: Bed Mobility  Bed Mobility: Yes  Bed Mobility 1  Bed Mobility 1: Supine to sitting  Level of Assistance 1: Minimum assistance  Bed Mobility Comments 1: increased time required d/t significant pain  Bed Mobility 2  Bed Mobility  2: Sitting to supine  Level of Assistance 2: Minimum assistance    Transfers  Transfer: Yes  Transfer 1  Technique 1: Sit to stand  Transfer Device 1: Walker  Transfer Level of Assistance 1: Moderate assistance  Trials/Comments 1: Unable to achieve full stand d/t increased pain in B knees and shoulders.  Transfers 2  Technique 2: Stand to sit  Transfer Device 2: " Walker  Transfer Level of Assistance 2: Moderate assistance            Vision:   Sensation:     Strength:     Other Activity:     Home Environment:     Perception:     Coordination:      Hand Function:  Hand Function  Gross Grasp: Functional  Extremities: LISA GONZALEZ :  (not formally assessed d/t increased pain) and GODFREY DONAHUE:  (not formally assessed d/t increased pain in shoulder)      Outcome Measures: [facility] Daily Activity  Putting on and taking off regular lower body clothing: Total  Bathing (including washing, rinsing, drying): A lot  Putting on and taking off regular upper body clothing: A lot  Toileting, which includes using toilet, bedpan or urinal: A lot  Taking care of personal grooming such as brushing teeth: A little  Eating Meals: A little  Daily Activity - Total Score: 13        Education Documentation  Body Mechanics, taught by Nettie Espinosa OT at 10/5/2023 10:14 AM.  Learner: Patient  Readiness: Acceptance  Method: Explanation  Response: Verbalizes Understanding, Needs Reinforcement    Precautions, taught by Nettie Espinosa OT at 10/5/2023 10:14 AM.  Learner: Patient  Readiness: Acceptance  Method: Explanation  Response: Verbalizes Understanding, Needs Reinforcement    ADL Training, taught by Nettie Espinosa OT at 10/5/2023 10:13 AM.  Learner: Patient  Readiness: Acceptance  Method: Explanation  Response: Verbalizes Understanding, Needs Reinforcement    Education Comments  No comments found.        OP EDUCATION:  Education  Individual(s) Educated: Patient  Education Provided: Ergonomics and postural realignment, Fall precautons, Risk and benefits of OT discussed with patient or other, POC discussed and agreed upon  Plan of Care Discussed and Agreed Upon: yes  Patient Response to Education: Patient/Caregiver Verbalized Understanding of Information    Goals:  Encounter Problems       Encounter Problems (Active)       Balance       STG - Maintains dynamic sitting balance with upper extremity support (Progressing)        Start:  10/05/23    Expected End:  10/20/23       Practice sitting on the edge of a bed with back support and set up to complete ADL tasks upon discharge.            Bathing       STG - Patient will bathe body (Progressing)       Start:  10/05/23    Expected End:  10/20/23       Pt. Will complete bathing task while seated with use of shower chair with back support with Min A upon d/c            Dressing Upper Extremities       Pt. Will complete UBD with close supervision while seated supported upon discharge.      STG - Patient will dress upper body (Progressing)       Start:  10/05/23    Expected End:  10/20/23               Dressings Lower Extremities       STG - Patient will complete lower body dressing (Progressing)       Start:  10/05/23    Expected End:  10/20/23       Pt. Will complete LBD task while seated supported with use AE to increase independence with distant sup upon discharge.            Transfers       STG - Transfer from bed to chair (Progressing)       Start:  10/05/23    Expected End:  10/20/23       Pt. Will complete Min A with LRAD and gait belt and FWW to safely complete bed/chair transfer upon discharge.         STG - Patient to transfer to and from sit to supine (Progressing)       Start:  10/05/23    Expected End:  10/20/23       Pt. Required distant sup. to safely complete sit/supine increased time d/t pain upon dc         STG - Patient will transfer sit to and from stand (Progressing)       Start:  10/05/23    Expected End:  10/20/23       Pt. Will safely transfer from sit/stand with close supervision and use of FWW upon d/c         STG - Patient will perform toilet transfer (Progressing)       Start:  10/05/23    Expected End:  10/20/23       Pt. Will complete safe functional toilet transfer with use of FWW and grab bar as able with close supervision upon discharge.         STG - Patient will perform tub/shower transfer (Progressing)       Start:  10/05/23    Expected End:   10/20/23       Pt. Will safely complete shower transfers with close supervision and use of shower chair and grab bar upon dc

## 2023-10-05 NOTE — CARE PLAN
Problem: Skin  Goal: Participates in plan/prevention/treatment measures  Outcome: Progressing  Goal: Prevent/manage excess moisture  Outcome: Progressing  Goal: Prevent/minimize sheer/friction injuries  Outcome: Progressing  Goal: Promote/optimize nutrition  Outcome: Progressing  Goal: Promote skin healing  Outcome: Progressing     Problem: Pain  Goal: Takes deep breaths with improved pain control throughout the shift  Outcome: Progressing  Goal: Turns in bed with improved pain control throughout the shift  Outcome: Progressing  Goal: Walks with improved pain control throughout the shift  Outcome: Progressing  Goal: Performs ADL's with improved pain control throughout shift  Outcome: Progressing  Goal: Participates in PT with improved pain control throughout the shift  Outcome: Progressing  Goal: Free from opioid side effects throughout the shift  Outcome: Progressing  Goal: Free from acute confusion related to pain meds throughout the shift  Outcome: Progressing     Problem: Fall/Injury  Goal: Not fall by end of shift  Outcome: Progressing  Goal: Be free from injury by end of the shift  Outcome: Progressing  Goal: Verbalize understanding of personal risk factors for fall in the hospital  Outcome: Progressing  Goal: Verbalize understanding of risk factor reduction measures to prevent injury from fall in the home  Outcome: Progressing  Goal: Use assistive devices by end of the shift  Outcome: Progressing  Goal: Pace activities to prevent fatigue by end of the shift  Outcome: Progressing     Problem: Pain - Adult  Goal: Verbalizes/displays adequate comfort level or baseline comfort level  Outcome: Progressing     Problem: Safety - Adult  Goal: Free from fall injury  Outcome: Progressing     Problem: Discharge Planning  Goal: Discharge to home or other facility with appropriate resources  Outcome: Progressing     Problem: Chronic Conditions and Co-morbidities  Goal: Patient's chronic conditions and co-morbidity  symptoms are monitored and maintained or improved  Outcome: Progressing   The patient's goals for the shift include managing pain.    The clinical goals for the shift include improvement in sodium levels and mentation.

## 2023-10-05 NOTE — CARE PLAN
Problem: Skin  Goal: Participates in plan/prevention/treatment measures  10/5/2023 0505 by Blessing Osullivan RN  Outcome: Progressing  10/5/2023 0458 by Blessing Osullivan RN  Outcome: Progressing  10/5/2023 0455 by Blessing Osullivan RN  Outcome: Progressing  10/5/2023 0322 by Blessing Osullivan RN  Outcome: Progressing  Goal: Prevent/manage excess moisture  10/5/2023 0505 by Blessing Osullivan, RN  Outcome: Progressing  10/5/2023 0458 by Blessing Osullivan RN  Outcome: Progressing  10/5/2023 0455 by Blessing Osullivan RN  Outcome: Progressing  10/5/2023 0322 by Blessing Osullivan, RN  Outcome: Progressing  Goal: Prevent/minimize sheer/friction injuries  10/5/2023 0505 by Blessing Osullivan RN  Outcome: Progressing  10/5/2023 0458 by Blessing Osullivan RN  Outcome: Progressing  10/5/2023 0455 by Blessing Osullivan, RN  Outcome: Progressing  10/5/2023 0322 by Blessing Osullivan, RN  Outcome: Progressing  Goal: Promote/optimize nutrition  10/5/2023 0505 by Blessing Osullivan, RN  Outcome: Progressing  10/5/2023 0458 by Blessing Osullivan, RN  Outcome: Progressing  10/5/2023 0455 by Blessing Osullivan, RN  Outcome: Progressing  10/5/2023 0322 by Blessing Osullivan RN  Outcome: Progressing  Goal: Promote skin healing  10/5/2023 0505 by Blessing Osullivan, RN  Outcome: Progressing  10/5/2023 0458 by Blessing Osullivan, RN  Outcome: Progressing  10/5/2023 0455 by Blessing Osullivan, RN  Outcome: Progressing  10/5/2023 0322 by Blessing Osullivan RN  Outcome: Progressing     Problem: Pain  Goal: Takes deep breaths with improved pain control throughout the shift  10/5/2023 0505 by Blessing Osullivan, RN  Outcome: Progressing  10/5/2023 0458 by Blessing Osullivan, RN  Outcome: Progressing  10/5/2023 0455 by Blessing Osullivan RN  Outcome: Progressing  10/5/2023 0322 by Blessing Osullivan RN  Outcome: Progressing  Goal: Turns in bed with improved pain control throughout the shift  10/5/2023 0505 by Blessing Osullivan, RN  Outcome: Progressing  10/5/2023 0458 by Blessing Osullivan  RN  Outcome: Progressing  10/5/2023 0455 by Blessing Osullivan RN  Outcome: Progressing  10/5/2023 0322 by Blessing Osullivan RN  Outcome: Progressing  Goal: Walks with improved pain control throughout the shift  10/5/2023 0505 by Blessing Osullivan, RN  Outcome: Progressing  10/5/2023 0458 by Blessing Osullivan RN  Outcome: Progressing  10/5/2023 0455 by Blessing Osullivan, RN  Outcome: Progressing  10/5/2023 0322 by Blessing Osullivan, RN  Outcome: Progressing  Goal: Performs ADL's with improved pain control throughout shift  10/5/2023 0505 by Blessing Osullivan, RN  Outcome: Progressing  10/5/2023 0458 by Blessing Osullivan RN  Outcome: Progressing  10/5/2023 0455 by Blessing Osullivan, RN  Outcome: Progressing  10/5/2023 0322 by Blessing Osullivan RN  Outcome: Progressing  Goal: Participates in PT with improved pain control throughout the shift  10/5/2023 0505 by Blessing Osullivan, RN  Outcome: Progressing  10/5/2023 0458 by Blessing Osullivan, RN  Outcome: Progressing  10/5/2023 0455 by Blessing Osullivan, RN  Outcome: Progressing  10/5/2023 0322 by Blessing Osullivan, RN  Outcome: Progressing  Goal: Free from opioid side effects throughout the shift  10/5/2023 0505 by Blessing Osullivan, RN  Outcome: Progressing  10/5/2023 0458 by Blessing Osullivan, RN  Outcome: Progressing  10/5/2023 0455 by Blessing Osullivan, RN  Outcome: Progressing  10/5/2023 0322 by Blessing Osullivan, RN  Outcome: Progressing  Goal: Free from acute confusion related to pain meds throughout the shift  10/5/2023 0505 by Blessing Osullivan, RN  Outcome: Progressing  10/5/2023 0458 by Blessing Osullivan, RN  Outcome: Progressing  10/5/2023 0455 by Blessing Osullivan, RN  Outcome: Progressing  10/5/2023 0322 by Blessing Osullivan RN  Outcome: Progressing     Problem: Fall/Injury  Goal: Not fall by end of shift  10/5/2023 0505 by Blessing Osullivan, RN  Outcome: Progressing  10/5/2023 0458 by Blessing Osullivan, RN  Outcome: Progressing  10/5/2023 0455 by Blessing Osullivan RN  Outcome:  Progressing  10/5/2023 0322 by Blessing Osullivan RN  Outcome: Progressing  Goal: Be free from injury by end of the shift  10/5/2023 0505 by Blessing Osullivan RN  Outcome: Progressing  10/5/2023 0458 by Blessing Osullivan RN  Outcome: Progressing  10/5/2023 0455 by Blessing Osullivan RN  Outcome: Progressing  10/5/2023 0322 by Blessing Osullivan, RN  Outcome: Progressing  Goal: Verbalize understanding of personal risk factors for fall in the hospital  10/5/2023 0505 by Blessing Osullivan, RN  Outcome: Progressing  10/5/2023 0458 by Blessing Osullivan RN  Outcome: Progressing  10/5/2023 0455 by Blessing Osullivan, RN  Outcome: Progressing  10/5/2023 0322 by Blessing Osullivan RN  Outcome: Progressing  Goal: Verbalize understanding of risk factor reduction measures to prevent injury from fall in the home  10/5/2023 0505 by Blessing Osullivan, RN  Outcome: Progressing  10/5/2023 0458 by Blessing Osullivan, RN  Outcome: Progressing  10/5/2023 0455 by Blessing Osullivan, RN  Outcome: Progressing  10/5/2023 0322 by Blessing Osullivan RN  Outcome: Progressing  Goal: Use assistive devices by end of the shift  10/5/2023 0505 by Blessing Osullivan, RN  Outcome: Progressing  10/5/2023 0458 by Blessing Osullivan, RN  Outcome: Progressing  10/5/2023 0455 by Blessing Osullivan, RN  Outcome: Progressing  10/5/2023 0322 by Blessing Osullivan, RN  Outcome: Progressing  Goal: Pace activities to prevent fatigue by end of the shift  10/5/2023 0505 by Blessing Osullivan, RN  Outcome: Progressing  10/5/2023 0458 by Blessing Osullivan, RN  Outcome: Progressing  10/5/2023 0455 by Blessing Osullivan, RN  Outcome: Progressing  10/5/2023 0322 by Blessing Osullivan RN  Outcome: Progressing     Problem: Pain - Adult  Goal: Verbalizes/displays adequate comfort level or baseline comfort level  10/5/2023 0505 by Blessing Osullivan, RN  Outcome: Progressing  10/5/2023 0458 by Blessing Osullivan, RN  Outcome: Progressing  10/5/2023 0455 by Blessing Osullivan, RN  Outcome: Progressing  10/5/2023 0322 by Blessing  GEMMA Osullivan  Outcome: Progressing     Problem: Safety - Adult  Goal: Free from fall injury  10/5/2023 0505 by Blessing Osullivan RN  Outcome: Progressing  10/5/2023 0458 by Blessing Osullivan RN  Outcome: Progressing  10/5/2023 0455 by Blessing Osullivan RN  Outcome: Progressing  10/5/2023 0322 by Blessing Osullivan RN  Outcome: Progressing     Problem: Discharge Planning  Goal: Discharge to home or other facility with appropriate resources  10/5/2023 0505 by Blessing Osullivan RN  Outcome: Progressing  10/5/2023 0458 by Blessing Osullivan RN  Outcome: Progressing  10/5/2023 0455 by Blessing Osullivan RN  Outcome: Progressing  10/5/2023 0322 by Blessing Osullivan RN  Outcome: Progressing     Problem: Chronic Conditions and Co-morbidities  Goal: Patient's chronic conditions and co-morbidity symptoms are monitored and maintained or improved  10/5/2023 0505 by Blessing Osullivan RN  Outcome: Progressing  10/5/2023 0458 by Blessing Osullivan RN  Outcome: Progressing  10/5/2023 0455 by Blessing Osullivan RN  Outcome: Progressing  10/5/2023 0322 by Blessing Osullivan RN  Outcome: Progressing   The patient's goals for the shift include Pt.will have a safe, restful and uneventful evening by end of shift.    The clinical goals for the shift include Pt. will have improvement in mentation with less forgetfulness by end of shift.

## 2023-10-05 NOTE — PROGRESS NOTES
Care Coordinator Note:  Cassia Humphries is a 89 y.o. female on day 2 of admission presenting with Hyponatremia.  PT/OT notes uploaded for SNF review.  If [facility] accepts patient, will need auth.  ADOD: 1-2 days  Etelvina Bardales RN TCC

## 2023-10-05 NOTE — ASSESSMENT & PLAN NOTE
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

## 2023-10-05 NOTE — CARE PLAN
The patient's goals for the shift include Pt will remain free from injury by end of shift.    The clinical goals for the shift include Pt will remain safe throughout the shift.

## 2023-10-05 NOTE — PROGRESS NOTES
"Cassia Humphries is a 89 y.o. female on day 2 of admission presenting with Hyponatremia.    Subjective   Slept well last night with the oxy. Didn't get out of bed yesterday. Dtr says she has not been drinking much at all.        Objective     Physical Exam  Cardiovascular:      Rate and Rhythm: Normal rate and regular rhythm.      Heart sounds: Normal heart sounds.   Pulmonary:      Breath sounds: Normal breath sounds.   Abdominal:      General: Bowel sounds are normal.      Palpations: Abdomen is soft.   Musculoskeletal:         General: Normal range of motion.   Skin:     Comments: Bruising by right chest/shoulder   Neurological:      General: No focal deficit present.      Mental Status: She is alert and oriented to person, place, and time.      Comments: Unable to lift both arms up fully   Psychiatric:         Mood and Affect: Mood normal.     Last Recorded Vitals  Blood pressure 125/68, pulse 85, temperature 36.4 °C (97.5 °F), temperature source Oral, resp. rate 18, height 1.549 m (5' 1\"), weight 56 kg (123 lb 7.3 oz), SpO2 97 %.  Intake/Output last 3 Shifts:  I/O last 3 completed shifts:  In: - (0 mL/kg)   Out: 1100 (19.6 mL/kg) [Urine:1100 (0.5 mL/kg/hr)]  Weight: 56 kg     Relevant Results           This patient currently has cardiac telemetry ordered; if you would like to modify or discontinue the telemetry order, click here to go to the orders activity to modify/discontinue the order.                 Assessment/Plan   89 yoF with hyponatremia on outside labs (was 129 on 9/25 and now 118 on 10/3).     Suspicion for SIADH.    10/5: Na not budging much. Will add salt tabs.     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      Alex Shrestha MD

## 2023-10-05 NOTE — CARE PLAN
Problem: Skin  Goal: Prevent/manage excess moisture  Outcome: Progressing   The patient's goals for the shift include Pt will remain free from injury by end of shift.    The clinical goals for the shift include Pt will remain safe throughout the shift.

## 2023-10-05 NOTE — PROGRESS NOTES
Physical Therapy    Physical Therapy Evaluation    Patient Name: Cassia Humphries  MRN: [id number]  Today's Date: 10/5/2023   Time Calculation  Start Time: 1146  Stop Time: 1208  Time Calculation (min): 22 min    Assessment/Plan   PT Assessment  PT Assessment Results: Decreased strength, Decreased range of motion, Decreased endurance, Impaired balance, Decreased mobility, Decreased safety awareness, Pain  Rehab Prognosis: Good  Barriers to Discharge: Elevated biIateral knee pain  Evaluation/Treatment Tolerance: Patient limited by pain, Patient limited by fatigue  Medical Staff Made Aware: Yes  Strengths: Rehab experience, Ability to acquire knowledge  Barriers to Participation:  (Bilateral knee pain)  End of Session Communication: Bedside nurse  Assessment Comment: Pt presents today with deficits in strength, balance, and elevated bilateral knee pain. Pt is currently below the reported PLOF and is at an increased risk of falls. Continued PT during the current admission would beenfit the pt to address the above factors limiting pt from achieving the PLOF and to decrease  the risk of falls.  End of Session Patient Position: Up in chair, Alarm off, not on at start of session (Pt daughter present in room and RN notified of pt positioning at end of session)  IP OR SWING BED PT PLAN  Inpatient or Swing Bed: Inpatient  PT Plan  Treatment/Interventions: Bed mobility, Transfer training, Gait training, Balance training, Strengthening, Therapeutic exercise, Therapeutic activity, Home exercise program  PT Plan: Skilled PT  PT Frequency: 3 times per week  PT Discharge Recommendations: Moderate intensity level of continued care  PT Recommended Transfer Status: Assist x1, Assistive device      Subjective   General Visit Information:  General  Reason for Referral: hyponatremia; weakness  Referred By: CORIE Shrestha  Past Medical History Relevant to Rehab: chronic hyponatremia, HTN, rectal bleed, depression, OA  Family/Caregiver Present:  Yes (Daughter)  Caregiver Feedback: Pt's daughter reports pt was doing better functionally at SNF and was SBA for mobility.  Prior to Session Communication: Bedside nurse  Patient Position Received: Bed, 3 rail up, Alarm off, not on at start of session  Preferred Learning Style: verbal, visual, kinesthetic  General Comment: Pt supine in bed upon PT arrival. Cleared to particpate with RN and agreeable to PT evaluation  Home Living:  Home Living  Type of Home: House (Pt from [facility])  Lives With: Alone (Daughter from [place] currently living with pt)  Home Adaptive Equipment: Walker rolling or standard, Cane  Home Layout: Two level  Bathroom Shower/Tub: Walk-in shower  Bathroom Toilet: Adaptive toilet seating  Bathroom Equipment: Raised toilet seat with rails  Prior Level of Function:  Prior Function Per Pt/Caregiver Report  Level of Jenkins: Needs assistance with ADLs, Needs assistance with homemaking, Needs assistance with functional transfers  Receives Help From: Family  ADL Assistance: Needs assistance  Homemaking Assistance: Independent  Ambulatory Assistance: Needs assistance  Gait: Assistive device  Precautions:  Precautions  Medical Precautions: Fall precautions (Pure wick)    Objective   Pain:  Pain Assessment  Pain Assessment: 0-10  Pain Score: 8  Pain Location: Knee  Pain Orientation: Right, Left  Cognition:  Cognition  Overall Cognitive Status: Within Functional Limits  Attention: Within Functional Limits      Activity Tolerance  Endurance: Tolerates 10 - 20 min exercise with multiple rests    Perception  Motor Planning: Appears intact      Postural Control  Postural Control: Impaired  Posture Comment: Pt demonstrates valgus positioning of left knee and forward flexion of trunk in standing.    Static Sitting Balance  Static Sitting-Balance Support: Bilateral upper extremity supported  Static Sitting-Level of Assistance: Close supervision  Static Sitting-Comment/Number of Minutes: 2    Static  Standing Balance  Static Standing-Balance Support: Bilateral upper extremity supported  Static Standing-Level of Assistance: Moderate assistance  Static Standing-Comment/Number of Minutes: 2  Dynamic Standing Balance  Dynamic Standing-Balance Support: Bilateral upper extremity supported  Functional Assessments:  Bed Mobility  Bed Mobility: Yes  Bed Mobility 1  Bed Mobility 1: Supine to sitting  Level of Assistance 1: Minimum assistance  Bed Mobility Comments 1: Assist with trunk into upright sitting with HOB elevated. Pt able to progress BLE off edge of bed.    Transfers  Transfer: Yes  Transfer 1  Transfer From 1: Sit to  Transfer to 1: Stand  Technique 1: Sit to stand  Transfer Device 1: Walker, Gait belt  Transfer Level of Assistance 1: Moderate assistance  Trials/Comments 1: Pt reached for walker to pull to stand. VC and demonstration for BUE push from bed to stand. Incrased time to reach standing with pt remaining in forward flexion at trunk. Valgus positioning of lLE in standing. VC to decrease distance between feet for more stable YANET.  Transfers 2  Transfer From 2: Stand to  Transfer to 2: Chair with arms  Technique 2: Stand to sit  Transfer Device 2: Walker (gait belt)  Transfer Level of Assistance 2: Contact guard  Trials/Comments 2: VC to position BLE against chair and for UE reach for armrest upon sitting. Good eccentric control upon sitting.    Ambulation/Gait Training  Ambulation/Gait Training Performed: Yes  Ambulation/Gait Training 1  Surface 1: Level tile  Device 1: Rolling walker  Gait Support Devices: Gait belt  Assistance 1: Contact guard, Moderate verbal cues  Comments/Distance (ft) 1: 20 ft x 1. Forward flexion at trunk in standing. VC and TC for more upright standing posture. Decreased anitha and step length. VC required to initiate turns. Increased time to perform turns with walker. Mild difficulty with object avoidance.  Extremity/Trunk Assessments:  RLE   RLE : Exceptions to WFL  AROM RLE  (degrees)  R Knee Extension 0-130:  (about 10 degree deficit from TKE)  Strength RLE  R Hip Flexion: 3+/5  R Knee Extension: 3+/5  R Ankle Dorsiflexion: 4/5  R Ankle Plantar Flexion: 3+/5  LLE   LLE : Exceptions to WFL  AROM LLE (degrees)  L Hip Flexion 0-125:  (about 15 degree deficit from TKE)  Strength LLE  L Hip Flexion: 3+/5  L Knee Extension: 3+/5  L Ankle Dorsiflexion: 4/5  L Ankle Plantar Flexion: 3+/5  Outcome Measures:  [facility] Basic Mobility  Turning from your back to your side while in a flat bed without using bedrails: A little  Moving from lying on your back to sitting on the side of a flat bed without using bedrails: A little  Moving to and from bed to chair (including a wheelchair): A lot  Standing up from a chair using your arms (e.g. wheelchair or bedside chair): A lot  To walk in hospital room: A little  Climbing 3-5 steps with railing: A lot  Basic Mobility - Total Score: 15    Encounter Problems       Encounter Problems (Active)       Balance       STG - Maintains dynamic sitting balance with upper extremity support (Progressing)       Start:  10/05/23    Expected End:  10/20/23       Practice sitting on the edge of a bed with back support and set up to complete ADL tasks upon discharge.            Balance       Pt performs all sitting balance with IND and standing balance with min assist and appropriate assistive device (Progressing)       Start:  10/05/23    Expected End:  10/19/23               Mobility       STG - Patient will ambulate >50 ft with CGA or better and appropriate assistive device (Progressing)       Start:  10/05/23    Expected End:  10/19/23               Pain - Adult          Safety       LTG - Patient will demonstrate safety requirements appropriate to situation/environment (Progressing)       Start:  10/05/23    Expected End:  10/19/23               Strength       Pt demonstrates BLE MMT grade of at least 4/5 in all MMT tested during initial PT evalaution (Progressing)        Start:  10/05/23    Expected End:  10/19/23               Transfers       STG - Transfer from bed to chair (Progressing)       Start:  10/05/23    Expected End:  10/20/23       Pt. Will complete Min A with LRAD and gait belt and FWW to safely complete bed/chair transfer upon discharge.         STG - Patient to transfer to and from sit to supine (Progressing)       Start:  10/05/23    Expected End:  10/20/23       Pt. Required distant sup. to safely complete sit/supine increased time d/t pain upon dc         STG - Patient will transfer sit to and from stand (Progressing)       Start:  10/05/23    Expected End:  10/20/23       Pt. Will safely transfer from sit/stand with close supervision and use of FWW upon d/c         STG - Patient will perform toilet transfer (Progressing)       Start:  10/05/23    Expected End:  10/20/23       Pt. Will complete safe functional toilet transfer with use of FWW and grab bar as able with close supervision upon discharge.         STG - Patient will perform tub/shower transfer (Progressing)       Start:  10/05/23    Expected End:  10/20/23       Pt. Will safely complete shower transfers with close supervision and use of shower chair and grab bar upon dc            Transfers       STG - Patient to transfer to and from sit to supine with SBA (Progressing)       Start:  10/05/23    Expected End:  10/19/23            STG - Patient will transfer sit to and from stand with min assist x 1 and appropriate assistive device (Progressing)       Start:  10/05/23    Expected End:  10/19/23                   Education Documentation  Home Exercise Program, taught by Yury Friedman PT at 10/5/2023 12:50 PM.  Learner: Family, Patient  Readiness: Acceptance  Method: Explanation  Response: Needs Reinforcement    Precautions, taught by Yury Friedman PT at 10/5/2023 12:50 PM.  Learner: Family, Patient  Readiness: Acceptance  Method: Explanation, Demonstration  Response: Verbalizes Understanding,  Needs Reinforcement    Body Mechanics, taught by Yury Friedman, PT at 10/5/2023 12:50 PM.  Learner: Family, Patient  Readiness: Acceptance  Method: Explanation, Demonstration  Response: Verbalizes Understanding, Needs Reinforcement    Mobility Training, taught by Yury Friedman, PT at 10/5/2023 12:50 PM.  Learner: Family, Patient  Readiness: Acceptance  Method: Explanation, Demonstration  Response: Verbalizes Understanding, Needs Reinforcement    Education Comments  No comments found.

## 2023-10-05 NOTE — PROGRESS NOTES
Nephrology Consult Progress Note    Admit Date: 10/3/2023    Assessment and Plan:  Pt with h/o chronic hyponatremia, HTN, who was recently dc after PE, AF who was sent from nursing facility. Pt c/o newer onset headache and also worsening recurrent nausea for the last few days.. Had another CTA of chest yesterday w/o acute findings.   - Hyponatremia: chronic for many years, no formal diagnosis I can see  Here with Mariam 117, Uosm 419, serum osm 254, Uacid 3.9  Na not much better yet, still 124, on FR  Likely SIADH vs reset osmostat background, worsen by occasional nausea     PLAN:  - FR 1.2L/day, adding salt tab to aid with water excretion, following labs    Interval history:  Felling very anxious this am    CURRENT MEDICATIONS:    Current Facility-Administered Medications:     acetaminophen (Tylenol) tablet 975 mg, 975 mg, oral, q8h, Alex Shrestha MD, 975 mg at 10/05/23 0913    apixaban (Eliquis) tablet 5 mg, 5 mg, oral, BID, Omaira Shea MD, 5 mg at 10/05/23 0913    bisacodyl (Dulcolax) suppository 10 mg, 10 mg, rectal, Once PRN, Omaira Shea MD    butalbital-acetaminophen-caff -40 mg per tablet 1 tablet, 1 tablet, oral, BID PRN, Alex Shrestha MD    cholecalciferol (Vitamin D-3) tablet 25 mcg, 25 mcg, oral, Daily, Omaira Shea MD, 25 mcg at 10/05/23 0913    cyanocobalamin (Vitamin B-12) tablet 500 mcg, 500 mcg, oral, Daily, Omaira Shea MD, 500 mcg at 10/05/23 0913    dicyclomine (Bentyl) capsule 10 mg, 10 mg, oral, q6h PRN, Omaira Shea MD    [Held by provider] lisinopril tablet 10 mg, 10 mg, oral, Daily, Omaira Shea MD    magnesium hydroxide (Milk of Magnesia) 2,400 mg/10 mL suspension 30 mL, 30 mL, oral, Daily PRN, Omaira Shea MD    oxyCODONE (Roxicodone) immediate release tablet 2.5 mg, 2.5 mg, oral, q6h PRN, Alex Shrestha MD, 2.5 mg at 10/04/23 1819    sennosides (Senokot) tablet 17.2 mg, 2 tablet, oral, BID, Omaira Shea MD, 17.2 mg at 10/05/23 0914    sodium chloride tablet 1  "g, 1,000 mg, oral, BID with meals, Mauricio Preston MD    sodium phosphates (Fleets) 19-7 gram/118 mL enema 1 enema, 1 enema, rectal, Daily PRN, Omaira Shea MD     No intake or output data in the 24 hours ending 10/05/23 0945    PHYSICAL EXAM:  /83 (Patient Position: Lying)   Pulse 66   Temp 36.3 °C (97.4 °F) (Temporal)   Resp 18   Ht 1.549 m (5' 1\")   Wt 56 kg (123 lb 7.3 oz)   SpO2 98%   BMI 23.33 kg/m²   No intake or output data in the 24 hours ending 10/05/23 0945  Gen: AAO, NAD, thin, frail  Neck: No JVD  Cardiac: RRR  Resp: clear BS  Abd: Soft, non tender, +BS, non distended   Ext: No edema   Neuro: moves 4 ext  Peripheral Pulses: Capillary refill <2secs, strong peripheral pulses.  Skin: Skin color, texture, turgor normal, no suspicious rashes or lesions.    Labs:  Results for orders placed or performed during the hospital encounter of 10/03/23 (from the past 24 hour(s))   Osmolality, urine   Result Value Ref Range    Osmolality, Urine Random 419 200 - 1,200 mOsm/kg   Sodium, Urine Random   Result Value Ref Range    Sodium, Urine Random 117 mmol/L    Creatinine, Urine Random 44.4 20.0 - 320.0 mg/dL    Sodium/Creatinine Ratio 264 Not established. mmol/g Creat   Basic Metabolic Panel   Result Value Ref Range    Glucose 99 74 - 99 mg/dL    Sodium 125 (L) 136 - 145 mmol/L    Potassium 4.6 3.5 - 5.3 mmol/L    Chloride 97 (L) 98 - 107 mmol/L    Bicarbonate 21 21 - 32 mmol/L    Anion Gap 12 10 - 20 mmol/L    Urea Nitrogen 13 6 - 23 mg/dL    Creatinine 0.85 0.50 - 1.05 mg/dL    eGFR 65 >60 mL/min/1.73m*2    Calcium 8.8 8.6 - 10.3 mg/dL   Renal Function Panel   Result Value Ref Range    Glucose 79 74 - 99 mg/dL    Sodium 124 (L) 136 - 145 mmol/L    Potassium 4.5 3.5 - 5.3 mmol/L    Chloride 98 98 - 107 mmol/L    Bicarbonate 19 (L) 21 - 32 mmol/L    Anion Gap 12 10 - 20 mmol/L    Urea Nitrogen 14 6 - 23 mg/dL    Creatinine 0.74 0.50 - 1.05 mg/dL    eGFR 76 >60 mL/min/1.73m*2    Calcium 8.8 8.6 - 10.3 mg/dL "    Phosphorus 3.2 2.5 - 4.9 mg/dL    Albumin 3.2 (L) 3.4 - 5.0 g/dL        DATA:   Diagnostic tests reviewed for today's visit:    New labs and imaging     Will continue to follow.   Time spent on consult progress: 35 min    Signature: Mauricio Preston MD

## 2023-10-05 NOTE — CARE PLAN
Problem: Skin  Goal: Participates in plan/prevention/treatment measures  10/5/2023 0455 by Blessing Osullivan RN  Outcome: Progressing  10/5/2023 0322 by Blessing Osullivan RN  Outcome: Progressing  Goal: Prevent/manage excess moisture  10/5/2023 0455 by Blessing Osullivan RN  Outcome: Progressing  10/5/2023 0322 by Blessing Osullivan RN  Outcome: Progressing  Goal: Prevent/minimize sheer/friction injuries  10/5/2023 0455 by Blessing Osullivan RN  Outcome: Progressing  10/5/2023 0322 by Blessing Osullivan RN  Outcome: Progressing  Goal: Promote/optimize nutrition  10/5/2023 0455 by Blessing Osullivan RN  Outcome: Progressing  10/5/2023 0322 by Blessing Osullivan RN  Outcome: Progressing  Goal: Promote skin healing  10/5/2023 0455 by Blessing Osullivan RN  Outcome: Progressing  10/5/2023 0322 by Blessing Osullivan RN  Outcome: Progressing     Problem: Pain  Goal: Takes deep breaths with improved pain control throughout the shift  10/5/2023 0455 by Blessing Osullivan RN  Outcome: Progressing  10/5/2023 0322 by Blessing Osullivan RN  Outcome: Progressing  Goal: Turns in bed with improved pain control throughout the shift  10/5/2023 0455 by Blessing Osullivan RN  Outcome: Progressing  10/5/2023 0322 by Blessing Osullivan RN  Outcome: Progressing  Goal: Walks with improved pain control throughout the shift  10/5/2023 0455 by Blessing Osullivan RN  Outcome: Progressing  10/5/2023 0322 by Blessing Osullivan RN  Outcome: Progressing  Goal: Performs ADL's with improved pain control throughout shift  10/5/2023 0455 by Blessing Osullivan RN  Outcome: Progressing  10/5/2023 0322 by Blessing Osullivan RN  Outcome: Progressing  Goal: Participates in PT with improved pain control throughout the shift  10/5/2023 0455 by Blessing Osullivan RN  Outcome: Progressing  10/5/2023 0322 by Blessing Osullivan RN  Outcome: Progressing  Goal: Free from opioid side effects throughout the shift  10/5/2023 0455 by Blessing Osulliavn RN  Outcome: Progressing  10/5/2023 0322 by Blessing  GEMMA Osullivan  Outcome: Progressing  Goal: Free from acute confusion related to pain meds throughout the shift  10/5/2023 0455 by Blessing Osullivan RN  Outcome: Progressing  10/5/2023 0322 by Blessing Osullivan RN  Outcome: Progressing     Problem: Fall/Injury  Goal: Not fall by end of shift  10/5/2023 0455 by Blessing Osullivan RN  Outcome: Progressing  10/5/2023 0322 by Blessing Osullivan RN  Outcome: Progressing  Goal: Be free from injury by end of the shift  10/5/2023 0455 by Blessing Osullivan RN  Outcome: Progressing  10/5/2023 0322 by Blessing Osullivan RN  Outcome: Progressing  Goal: Verbalize understanding of personal risk factors for fall in the hospital  10/5/2023 0455 by Blessing Osullivan RN  Outcome: Progressing  10/5/2023 0322 by Blessing Osullivan RN  Outcome: Progressing  Goal: Verbalize understanding of risk factor reduction measures to prevent injury from fall in the home  10/5/2023 0455 by Blessing Osullivan RN  Outcome: Progressing  10/5/2023 0322 by Blessing Osullivan RN  Outcome: Progressing  Goal: Use assistive devices by end of the shift  10/5/2023 0455 by Blessing Osullivan RN  Outcome: Progressing  10/5/2023 0322 by Blessing Osullivan RN  Outcome: Progressing  Goal: Pace activities to prevent fatigue by end of the shift  10/5/2023 0455 by Blessing Osullivan RN  Outcome: Progressing  10/5/2023 0322 by Blessing Osullivan RN  Outcome: Progressing     Problem: Pain - Adult  Goal: Verbalizes/displays adequate comfort level or baseline comfort level  10/5/2023 0455 by Blessing Osullivan RN  Outcome: Progressing  10/5/2023 0322 by Blessing Osullivan RN  Outcome: Progressing     Problem: Safety - Adult  Goal: Free from fall injury  10/5/2023 0455 by Blessing Osullivan RN  Outcome: Progressing  10/5/2023 0322 by Blessing Osullivan RN  Outcome: Progressing     Problem: Discharge Planning  Goal: Discharge to home or other facility with appropriate resources  10/5/2023 0455 by Blessing Osullivan RN  Outcome: Progressing  10/5/2023 0322 by  Blessing Osullivan RN  Outcome: Progressing     Problem: Chronic Conditions and Co-morbidities  Goal: Patient's chronic conditions and co-morbidity symptoms are monitored and maintained or improved  10/5/2023 0455 by Blessing Osullivan RN  Outcome: Progressing  10/5/2023 0322 by Blessing Osullivan RN  Outcome: Progressing   The patient's goals for the shift include having a sodium level WNL by end of shift.     The clinical goals for the shift include pain management

## 2023-10-05 NOTE — HOSPITAL COURSE
89 yoF with hyponatremia on outside labs (was 129 on 9/25 and now 118 on 10/3).   Of note, she recently had a fall with suspected trauma; her right shoulder was subsequently noted to be bruised; I suspect she sustained a rotator cuff tear from this trauma.     Suspicion for SIADH. Cymbalta has been stopped. Fluid restriction was enacted.   10/5: Na not budging much. Will add salt tabs.   Sodium has subsequently gradually climbed up to close to the normal range. On discharge, she will continue salt tab daily with prn lasix for leg swelling. She was encouraged solute intact with continued fluid restriction. Discharged to SNF in stable condition.

## 2023-10-05 NOTE — CARE PLAN
Problem: Balance  Goal: STG - Maintains dynamic sitting balance with upper extremity support  Description: INTERVENTIONS:  1. Practice sitting on the edge of a bed/mat with minimal support.  Outcome: Progressing       Problem: Balance  Goal: STG - Maintains dynamic sitting balance with upper extremity support  Description: Practice sitting on the edge of a bed with back support and set up to complete ADL tasks upon discharge.  Outcome: Progressing     Problem: Bathing  Goal: STG - Patient will bathe body  Description: Pt. Will complete bathing task while seated with use of shower chair with back support with Min A upon d/c  Outcome: Progressing     Problem: Dressings Lower Extremities  Goal: STG - Patient will complete lower body dressing  Description: Pt. Will complete LBD task while seated supported with use AE to increase independence with distant sup upon discharge.  Outcome: Progressing     Problem: Dressing Upper Extremities  Description: Pt. Will complete UBD with close supervision while seated supported upon discharge.  Goal: STG - Patient will dress upper body  Outcome: Progressing     Problem: Transfers  Goal: STG - Transfer from bed to chair  Description: Pt. Will complete Min A with LRAD and gait belt and FWW to safely complete bed/chair transfer upon discharge.  Outcome: Progressing  Goal: STG - Patient to transfer to and from sit to supine  Description: Pt. Required distant sup. to safely complete sit/supine increased time d/t pain upon dc  Outcome: Progressing  Goal: STG - Patient will transfer sit to and from stand  Description: Pt. Will safely transfer from sit/stand with close supervision and use of FWW upon d/c  Outcome: Progressing  Goal: STG - Patient will perform toilet transfer  Description: Pt. Will complete safe functional toilet transfer with use of FWW and grab bar as able with close supervision upon discharge.  Outcome: Progressing  Goal: STG - Patient will perform tub/shower  transfer  Description: Pt. Will safely complete shower transfers with close supervision and use of shower chair and grab bar upon dc  Outcome: Progressing

## 2023-10-06 ENCOUNTER — TELEPHONE (OUTPATIENT)
Dept: PRIMARY CARE | Facility: CLINIC | Age: 88
End: 2023-10-06
Payer: MEDICARE

## 2023-10-06 ENCOUNTER — APPOINTMENT (OUTPATIENT)
Dept: RADIOLOGY | Facility: HOSPITAL | Age: 88
DRG: 645 | End: 2023-10-06
Payer: MEDICARE

## 2023-10-06 LAB
ALBUMIN SERPL BCP-MCNC: 3.5 G/DL (ref 3.4–5)
ANION GAP SERPL CALC-SCNC: 14 MMOL/L (ref 10–20)
BUN SERPL-MCNC: 15 MG/DL (ref 6–23)
CALCIUM SERPL-MCNC: 9.2 MG/DL (ref 8.6–10.3)
CHLORIDE SERPL-SCNC: 98 MMOL/L (ref 98–107)
CO2 SERPL-SCNC: 20 MMOL/L (ref 21–32)
CREAT SERPL-MCNC: 0.7 MG/DL (ref 0.5–1.05)
GFR SERPL CREATININE-BSD FRML MDRD: 82 ML/MIN/1.73M*2
GLUCOSE SERPL-MCNC: 88 MG/DL (ref 74–99)
PHOSPHATE SERPL-MCNC: 3.6 MG/DL (ref 2.5–4.9)
POTASSIUM SERPL-SCNC: 4.4 MMOL/L (ref 3.5–5.3)
SODIUM SERPL-SCNC: 128 MMOL/L (ref 136–145)

## 2023-10-06 PROCEDURE — 1200000002 HC GENERAL ROOM WITH TELEMETRY DAILY

## 2023-10-06 PROCEDURE — 2500000001 HC RX 250 WO HCPCS SELF ADMINISTERED DRUGS (ALT 637 FOR MEDICARE OP): Performed by: HOSPITALIST

## 2023-10-06 PROCEDURE — 36415 COLL VENOUS BLD VENIPUNCTURE: CPT | Performed by: INTERNAL MEDICINE

## 2023-10-06 PROCEDURE — 99233 SBSQ HOSP IP/OBS HIGH 50: CPT | Performed by: INTERNAL MEDICINE

## 2023-10-06 PROCEDURE — 2500000001 HC RX 250 WO HCPCS SELF ADMINISTERED DRUGS (ALT 637 FOR MEDICARE OP): Performed by: INTERNAL MEDICINE

## 2023-10-06 PROCEDURE — 80069 RENAL FUNCTION PANEL: CPT | Performed by: INTERNAL MEDICINE

## 2023-10-06 PROCEDURE — 74018 RADEX ABDOMEN 1 VIEW: CPT | Performed by: RADIOLOGY

## 2023-10-06 PROCEDURE — 74018 RADEX ABDOMEN 1 VIEW: CPT

## 2023-10-06 RX ORDER — ALUMINUM HYDROXIDE, MAGNESIUM HYDROXIDE, AND SIMETHICONE 1200; 120; 1200 MG/30ML; MG/30ML; MG/30ML
10 SUSPENSION ORAL 4 TIMES DAILY PRN
Status: DISCONTINUED | OUTPATIENT
Start: 2023-10-06 | End: 2023-10-09 | Stop reason: HOSPADM

## 2023-10-06 RX ORDER — FUROSEMIDE 20 MG/1
20 TABLET ORAL ONCE
Status: COMPLETED | OUTPATIENT
Start: 2023-10-06 | End: 2023-10-06

## 2023-10-06 RX ADMIN — ACETAMINOPHEN 975 MG: 325 TABLET ORAL at 01:45

## 2023-10-06 RX ADMIN — FUROSEMIDE 20 MG: 20 TABLET ORAL at 10:07

## 2023-10-06 RX ADMIN — SODIUM CHLORIDE 1 G: 1 TABLET ORAL at 09:55

## 2023-10-06 RX ADMIN — SENNOSIDES 17.2 MG: 8.6 TABLET, FILM COATED ORAL at 10:08

## 2023-10-06 RX ADMIN — CYANOCOBALAMIN TAB 500 MCG 500 MCG: 500 TAB at 09:55

## 2023-10-06 RX ADMIN — ACETAMINOPHEN 975 MG: 325 TABLET ORAL at 19:43

## 2023-10-06 RX ADMIN — APIXABAN 5 MG: 5 TABLET, FILM COATED ORAL at 20:31

## 2023-10-06 RX ADMIN — ACETAMINOPHEN 975 MG: 325 TABLET ORAL at 10:08

## 2023-10-06 RX ADMIN — SODIUM CHLORIDE 1 G: 1 TABLET ORAL at 19:46

## 2023-10-06 RX ADMIN — OXYCODONE HYDROCHLORIDE 2.5 MG: 5 TABLET ORAL at 13:38

## 2023-10-06 RX ADMIN — Medication 15 G: at 19:42

## 2023-10-06 RX ADMIN — SENNOSIDES 17.2 MG: 8.6 TABLET, FILM COATED ORAL at 20:31

## 2023-10-06 RX ADMIN — APIXABAN 5 MG: 5 TABLET, FILM COATED ORAL at 09:55

## 2023-10-06 RX ADMIN — Medication 25 MCG: at 10:08

## 2023-10-06 ASSESSMENT — COGNITIVE AND FUNCTIONAL STATUS - GENERAL
CLIMB 3 TO 5 STEPS WITH RAILING: A LOT
STANDING UP FROM CHAIR USING ARMS: A LOT
TOILETING: A LOT
MOVING TO AND FROM BED TO CHAIR: A LOT
WALKING IN HOSPITAL ROOM: A LOT
HELP NEEDED FOR BATHING: A LOT
TURNING FROM BACK TO SIDE WHILE IN FLAT BAD: A LITTLE
MOBILITY SCORE: 14
DRESSING REGULAR LOWER BODY CLOTHING: TOTAL
PERSONAL GROOMING: A LITTLE
DAILY ACTIVITIY SCORE: 14
DRESSING REGULAR UPPER BODY CLOTHING: A LOT
MOVING FROM LYING ON BACK TO SITTING ON SIDE OF FLAT BED WITH BEDRAILS: A LITTLE

## 2023-10-06 ASSESSMENT — PAIN SCALES - GENERAL
PAINLEVEL_OUTOF10: 7
PAINLEVEL_OUTOF10: 3

## 2023-10-06 NOTE — PROGRESS NOTES
Waiting on SNF acceptance.  If accepted, patient will need auth.  Will continue to follow for discharge planning needs.     Willow Savage RN

## 2023-10-06 NOTE — TELEPHONE ENCOUNTER
Dr Noonan is handling this pt's care currently as she is a pt in [facility].

## 2023-10-06 NOTE — TELEPHONE ENCOUNTER
Dr. Noonan, the hospital has been doing a lot of blood work.  She is still admitted.  Do you want to wait on the orders that you entered for the pt?

## 2023-10-06 NOTE — PROGRESS NOTES
Nephrology Consult Progress Note    Admit Date: 10/3/2023    Assessment and Plan:  Pt with h/o chronic hyponatremia, HTN, who was recently dc after PE, AF who was sent from nursing facility. Pt c/o newer onset headache and also worsening recurrent nausea for the last few days.. Had another CTA of chest yesterday w/o acute findings.   - Hyponatremia: chronic for many years, no formal diagnosis I can see  Here with Mariam 117, Uosm 419, serum osm 254, Uacid 3.9  Likely SIADH vs reset osmostat background, worsen by occasional nausea  Na remains stable at 124, not much better after initial improvement   She is on FR and salt tab      PLAN:  - Keeping same FR+sat tab, will give one dose of po lasix and Ure-Na to help with free water excretion, following labs    Interval history:  [place]    CURRENT MEDICATIONS:    Current Facility-Administered Medications:     acetaminophen (Tylenol) tablet 975 mg, 975 mg, oral, q8h, Alex Shrestha MD, 975 mg at 10/06/23 0145    apixaban (Eliquis) tablet 5 mg, 5 mg, oral, BID, Omaira Shea MD, 5 mg at 10/05/23 2047    bisacodyl (Dulcolax) suppository 10 mg, 10 mg, rectal, Once PRN, Omaira Shea MD    butalbital-acetaminophen-caff -40 mg per tablet 1 tablet, 1 tablet, oral, BID PRN, Alex Shrestha MD    cholecalciferol (Vitamin D-3) tablet 25 mcg, 25 mcg, oral, Daily, Omaira Shea MD, 25 mcg at 10/05/23 0913    cyanocobalamin (Vitamin B-12) tablet 500 mcg, 500 mcg, oral, Daily, Omaira Shea MD, 500 mcg at 10/05/23 0913    dicyclomine (Bentyl) capsule 10 mg, 10 mg, oral, q6h PRN, Omaira Seha MD    furosemide (Lasix) tablet 20 mg, 20 mg, oral, Once, Mauricio Preston MD    [Held by provider] lisinopril tablet 10 mg, 10 mg, oral, Daily, Omaira Shea MD    magnesium hydroxide (Milk of Magnesia) 2,400 mg/10 mL suspension 30 mL, 30 mL, oral, Daily PRN, Omaira Shea MD    oxyCODONE (Roxicodone) immediate release tablet 2.5 mg, 2.5 mg, oral, q6h PRN, Alex Shrestha MD, 2.5  "mg at 10/05/23 2133    sennosides (Senokot) tablet 17.2 mg, 2 tablet, oral, BID, Omaira Shea MD, 17.2 mg at 10/05/23 2047    sodium chloride tablet 1 g, 1,000 mg, oral, BID with meals, Mauricio Preston MD, 1 g at 10/05/23 1730    sodium phosphates (Fleets) 19-7 gram/118 mL enema 1 enema, 1 enema, rectal, Daily PRN, Omaira Shea MD    urea (Ure-Na) oral powder 15 g, 15 g, oral, Once, Mauricio Preston MD       Intake/Output Summary (Last 24 hours) at 10/6/2023 0933  Last data filed at 10/5/2023 2045  Gross per 24 hour   Intake 120 ml   Output --   Net 120 ml       PHYSICAL EXAM:  /82   Pulse 67   Temp 36.3 °C (97.3 °F) (Oral)   Resp 16   Ht 1.549 m (5' 1\")   Wt 56 kg (123 lb 7.3 oz)   SpO2 95%   BMI 23.33 kg/m²     Intake/Output Summary (Last 24 hours) at 10/6/2023 0933  Last data filed at 10/5/2023 2045  Gross per 24 hour   Intake 120 ml   Output --   Net 120 ml     Gen: AAO, NAD, thin, frail  Neck: No JVD  Cardiac: RRR  Resp: clear BS  Abd: Soft, non tender, +BS, non distended   Ext: No edema   Neuro: moves 4 ext  Peripheral Pulses: Capillary refill <2secs, strong peripheral pulses.  Skin: Skin color, texture, turgor normal, no suspicious rashes or lesions.    Labs:  No results found for this or any previous visit (from the past 24 hour(s)).       DATA:   Diagnostic tests reviewed for today's visit:    New labs and imaging     Will continue to follow.   Time spent on consult progress: 35 min    Signature: Mauricio Preston MD   "

## 2023-10-06 NOTE — CARE PLAN
Problem: Skin  Goal: Participates in plan/prevention/treatment measures  Outcome: Progressing   The patient's goals for the shift include Pt will rate pain < 5 by end of shift.    The clinical goals for the shift include Pt will remain safe by end of shift.    Over the shift, the patient did not make progress toward the following goals. Barriers to progression include ***. Recommendations to address these barriers include ***.

## 2023-10-06 NOTE — CARE PLAN
The patient's goals for the shift include Pt will rate pain < 5 by end of shift.    The clinical goals for the shift include Pt will remain safe by end of shift.    Over the shift, the patient did not make progress toward the following goals. Barriers to progression include ***. Recommendations to address these barriers include ***.

## 2023-10-06 NOTE — PROGRESS NOTES
"Cassia Humphries is a 89 y.o. female on day 3 of admission presenting with Hyponatremia.    Subjective   On room air, afebrile, no overnight events reported. Tolerating PO intake.        Objective     Physical Exam  Cardiovascular:      Rate and Rhythm: Normal rate and regular rhythm.      Heart sounds: Normal heart sounds.   Pulmonary:      Breath sounds: Normal breath sounds.   Abdominal:      General: Bowel sounds are normal.      Palpations: Abdomen is soft.   Musculoskeletal:         General: Normal range of motion.   Neurological:      General: No focal deficit present.      Mental Status: She is alert and oriented to person, place, and time.      Comments:   Psychiatric:         Mood and Affect: Mood normal.     Last Recorded Vitals  Blood pressure 136/82, pulse 67, temperature 36.3 °C (97.3 °F), temperature source Oral, resp. rate 16, height 1.549 m (5' 1\"), weight 56 kg (123 lb 7.3 oz), SpO2 95 %.  Intake/Output last 3 Shifts:  I/O last 3 completed shifts:  In: 120 (2.1 mL/kg) [P.O.:120]  Out: - (0 mL/kg)   Weight: 56 kg     Relevant Results           This patient currently has cardiac telemetry ordered; if you would like to modify or discontinue the telemetry order, click here to go to the orders activity to modify/discontinue the order.             Assessment/Plan   89 yoF with hyponatremia on outside labs (was 129 on 9/25 and now 118 on 10/3).     Suspicion for SIADH.    10/5: Na not budging much. Will add salt tabs.     10/6;  HypoNa, ? SIADH.... na still low... cont salt tabs, urea, monitor, f/up renal.   Home regimen for chronic conditions  Dvt px covered with eliquis.   Pt/ot  I believe pt is from SNF.       Osteoarthritis of knee  Assessment & Plan  PT to see while here to see if needs to go back to SNF  Will try low-dose oxy    * Hyponatremia  Assessment & Plan  Seemingly from SIADH  Continue fluid restrict  Cymbalta should be stopped  Salt tabs added  Renal following            I spent 35 minutes " in the professional and overall care of this patient.      Neil Oropeza MD

## 2023-10-07 ENCOUNTER — APPOINTMENT (OUTPATIENT)
Dept: RADIOLOGY | Facility: HOSPITAL | Age: 88
DRG: 645 | End: 2023-10-07
Payer: MEDICARE

## 2023-10-07 LAB
ANION GAP SERPL CALC-SCNC: 9 MMOL/L (ref 10–20)
BUN SERPL-MCNC: 37 MG/DL (ref 6–23)
CALCIUM SERPL-MCNC: 9.4 MG/DL (ref 8.6–10.3)
CHLORIDE SERPL-SCNC: 100 MMOL/L (ref 98–107)
CO2 SERPL-SCNC: 24 MMOL/L (ref 21–32)
CREAT SERPL-MCNC: 0.78 MG/DL (ref 0.5–1.05)
ERYTHROCYTE [DISTWIDTH] IN BLOOD BY AUTOMATED COUNT: 14.6 % (ref 11.5–14.5)
GFR SERPL CREATININE-BSD FRML MDRD: 72 ML/MIN/1.73M*2
GLUCOSE SERPL-MCNC: 86 MG/DL (ref 74–99)
HCT VFR BLD AUTO: 29.1 % (ref 36–46)
HGB BLD-MCNC: 9.8 G/DL (ref 12–16)
MCH RBC QN AUTO: 33.8 PG (ref 26–34)
MCHC RBC AUTO-ENTMCNC: 33.7 G/DL (ref 32–36)
MCV RBC AUTO: 100 FL (ref 80–100)
NRBC BLD-RTO: 0 /100 WBCS (ref 0–0)
PLATELET # BLD AUTO: 367 X10*3/UL (ref 150–450)
PMV BLD AUTO: 8.9 FL (ref 7.5–11.5)
POTASSIUM SERPL-SCNC: 4.2 MMOL/L (ref 3.5–5.3)
RBC # BLD AUTO: 2.9 X10*6/UL (ref 4–5.2)
SODIUM SERPL-SCNC: 129 MMOL/L (ref 136–145)
WBC # BLD AUTO: 4.9 X10*3/UL (ref 4.4–11.3)

## 2023-10-07 PROCEDURE — 36415 COLL VENOUS BLD VENIPUNCTURE: CPT | Performed by: INTERNAL MEDICINE

## 2023-10-07 PROCEDURE — 80048 BASIC METABOLIC PNL TOTAL CA: CPT | Performed by: INTERNAL MEDICINE

## 2023-10-07 PROCEDURE — 2500000001 HC RX 250 WO HCPCS SELF ADMINISTERED DRUGS (ALT 637 FOR MEDICARE OP): Performed by: INTERNAL MEDICINE

## 2023-10-07 PROCEDURE — 85027 COMPLETE CBC AUTOMATED: CPT | Performed by: INTERNAL MEDICINE

## 2023-10-07 PROCEDURE — 73502 X-RAY EXAM HIP UNI 2-3 VIEWS: CPT | Mod: LT,FY

## 2023-10-07 PROCEDURE — 73502 X-RAY EXAM HIP UNI 2-3 VIEWS: CPT | Mod: LEFT SIDE | Performed by: RADIOLOGY

## 2023-10-07 PROCEDURE — 1200000002 HC GENERAL ROOM WITH TELEMETRY DAILY

## 2023-10-07 PROCEDURE — 2500000001 HC RX 250 WO HCPCS SELF ADMINISTERED DRUGS (ALT 637 FOR MEDICARE OP): Performed by: HOSPITALIST

## 2023-10-07 PROCEDURE — 99232 SBSQ HOSP IP/OBS MODERATE 35: CPT | Performed by: INTERNAL MEDICINE

## 2023-10-07 RX ORDER — KETOROLAC TROMETHAMINE 15 MG/ML
15 INJECTION, SOLUTION INTRAMUSCULAR; INTRAVENOUS EVERY 6 HOURS PRN
Status: DISCONTINUED | OUTPATIENT
Start: 2023-10-07 | End: 2023-10-08

## 2023-10-07 RX ADMIN — ACETAMINOPHEN 975 MG: 325 TABLET ORAL at 08:32

## 2023-10-07 RX ADMIN — SODIUM CHLORIDE 1 G: 1 TABLET ORAL at 17:14

## 2023-10-07 RX ADMIN — SENNOSIDES 17.2 MG: 8.6 TABLET, FILM COATED ORAL at 21:27

## 2023-10-07 RX ADMIN — SENNOSIDES 17.2 MG: 8.6 TABLET, FILM COATED ORAL at 08:32

## 2023-10-07 RX ADMIN — OXYCODONE HYDROCHLORIDE 2.5 MG: 5 TABLET ORAL at 15:12

## 2023-10-07 RX ADMIN — OXYCODONE HYDROCHLORIDE 2.5 MG: 5 TABLET ORAL at 21:27

## 2023-10-07 RX ADMIN — SODIUM CHLORIDE 1 G: 1 TABLET ORAL at 08:32

## 2023-10-07 RX ADMIN — CYANOCOBALAMIN TAB 500 MCG 500 MCG: 500 TAB at 08:32

## 2023-10-07 RX ADMIN — APIXABAN 5 MG: 5 TABLET, FILM COATED ORAL at 21:27

## 2023-10-07 RX ADMIN — ACETAMINOPHEN 975 MG: 325 TABLET ORAL at 17:14

## 2023-10-07 RX ADMIN — APIXABAN 5 MG: 5 TABLET, FILM COATED ORAL at 08:32

## 2023-10-07 RX ADMIN — ACETAMINOPHEN 975 MG: 325 TABLET ORAL at 01:16

## 2023-10-07 RX ADMIN — Medication 25 MCG: at 08:32

## 2023-10-07 ASSESSMENT — COGNITIVE AND FUNCTIONAL STATUS - GENERAL
PERSONAL GROOMING: TOTAL
MOVING TO AND FROM BED TO CHAIR: A LOT
MOBILITY SCORE: 12
CLIMB 3 TO 5 STEPS WITH RAILING: A LOT
TURNING FROM BACK TO SIDE WHILE IN FLAT BAD: A LOT
DRESSING REGULAR LOWER BODY CLOTHING: TOTAL
EATING MEALS: TOTAL
MOVING FROM LYING ON BACK TO SITTING ON SIDE OF FLAT BED WITH BEDRAILS: A LOT
WALKING IN HOSPITAL ROOM: A LOT
DRESSING REGULAR UPPER BODY CLOTHING: TOTAL
HELP NEEDED FOR BATHING: TOTAL
TOILETING: TOTAL
STANDING UP FROM CHAIR USING ARMS: A LOT
DAILY ACTIVITIY SCORE: 6

## 2023-10-07 ASSESSMENT — PAIN - FUNCTIONAL ASSESSMENT: PAIN_FUNCTIONAL_ASSESSMENT: 0-10

## 2023-10-07 ASSESSMENT — PAIN SCALES - GENERAL
PAINLEVEL_OUTOF10: 7
PAINLEVEL_OUTOF10: 6

## 2023-10-07 NOTE — PROGRESS NOTES
"Cassia Humphries is a 89 y.o. female on day 4 of admission presenting with Hyponatremia.    Subjective   Seen and examined.  Awake and lucid.  No acute events.         Objective     Physical Exam  Constitutional:       Appearance: Normal appearance.   HENT:      Head: Normocephalic.      Mouth/Throat:      Mouth: Mucous membranes are moist.      Pharynx: Oropharynx is clear.   Eyes:      Conjunctiva/sclera: Conjunctivae normal.   Cardiovascular:      Rate and Rhythm: Normal rate.   Pulmonary:      Effort: Pulmonary effort is normal.      Breath sounds: Normal breath sounds.   Abdominal:      General: Bowel sounds are normal.      Palpations: Abdomen is soft.   Musculoskeletal:         General: Normal range of motion.      Right lower leg: Edema present.      Left lower leg: Edema present.   Skin:     General: Skin is warm and dry.   Neurological:      General: No focal deficit present.      Mental Status: She is alert and oriented to person, place, and time.         Last Recorded Vitals  Blood pressure 125/82, pulse 84, temperature 36.6 °C (97.9 °F), temperature source Oral, resp. rate 18, height 1.549 m (5' 1\"), weight 56 kg (123 lb 7.3 oz), SpO2 98 %.  Intake/Output last 3 Shifts:  I/O last 3 completed shifts:  In: 360 (6.4 mL/kg) [P.O.:360]  Out: - (0 mL/kg)   Weight: 56 kg     Relevant Results      Lab Results   Component Value Date    WBC 4.9 10/07/2023    HGB 9.8 (L) 10/07/2023    HCT 29.1 (L) 10/07/2023     10/07/2023     10/07/2023     Lab Results   Component Value Date    GLUCOSE 86 10/07/2023    CALCIUM 9.4 10/07/2023     (L) 10/07/2023    K 4.2 10/07/2023    CO2 24 10/07/2023     10/07/2023    BUN 37 (H) 10/07/2023    CREATININE 0.78 10/07/2023        Assessment/Plan   Principal Problem:    Hyponatremia  Active Problems:    Anemia    Benign essential hypertension    Osteoarthritis of knee    Other chronic pain    VTE (venous thromboembolism)    Rotator cuff arthropathy of both " addi Humphries is a 89 y.o. female with a past medical history of hypertension, rectal bleeding, chronic hyponatremia, depression, venous insufficiency, osteoarthritis with a recent history of right lower limb DVT, pulmonary embolus and atrial fibrillation back in late August.  She comes in now from a skilled nursing facility for evaluation of acute on chronic hyponatremia with a plasma sodium of 118 mmol/a liter.    She was seen by Dr. Preston.  Hyponatremia work-up is notable for a Mariam 117, Uosm 419, serum osm 254, Uacid 3.9.  TSH was within normal limits.  She is noted to have chronic hyponatremia albeit not to this extent.  She was recently prescribed Cymbalta but her hyponatremia predates this.  She has not on thiazides, further antidepressants/anticonvulsants or anti-inflammatories.  Her clinical picture is consistent with SIADH. Her daughter also notes poor solute intake. She is currently on a 1.2 L fluid restriction and is getting sodium chloride tablets as well as a dose of urea.  Her sodium is up to 129 mmol/liter.  I will continue her current fluid restriction.  She has lower limb edema, 2 D ECHO shows a preserved EF with right heart disease but this was in the setting of an acute PE. But we must caution sodium chloride tablets. She is off the Cymbalta. Trend her RFP. Will follow.      I spent 45 minutes in the professional and overall care of this patient.      Alfredo Watkins, DO

## 2023-10-07 NOTE — PROGRESS NOTES
[facility] communication received from [facility]. Facility accepts patient.  Weekend hand off reports once SNF  accepts. Patient to have AUTH request. Request sent to N to initiate AUTH for [facility].   Danielle BINGHAM RN [facility]    10/07/2023 1435 AUTH APPROVED ( good for 72 hours)  for [facility].  Secure Chate sent to Dr. Oropeza and  RN with notification of AUTH.   Danielle BINGHAM RN [facility]    10/07/2023 1530 Patient not Medically Ready for Discharge . Dr. Oropeza anticipates Sunday/ Monday Discharge. [facility] spoke with patient's daughter / Charlette via bedside phone. She had questions concerning AUTH process & what type of Therapy activities are covered & for how long.  [facility] informed Therapy Team formulates a plan once patient arrives to SNF. She was advised to follow up with SNF once patient is admit to facility. Daughter agreeable with this plan.    Danielle BINGHAM RN [facility]

## 2023-10-07 NOTE — PROGRESS NOTES
"Cassia Humphries is a 89 y.o. female on day 4 of admission presenting with Hyponatremia.    Subjective   On room air, afebrile, no overnight events reported. Tolerating PO intake. She reports L hip pain per RN.        Objective     Physical Exam  Cardiovascular:      Rate and Rhythm: Normal rate and regular rhythm.      Heart sounds: Normal heart sounds.   Pulmonary:      Breath sounds: Normal breath sounds.   Abdominal:      General: Bowel sounds are normal.      Palpations: Abdomen is soft.   Musculoskeletal:         General: Normal range of motion.   Neurological:      General: No focal deficit present.      Mental Status: She is alert and oriented to person, place, and time.      Comments:   Psychiatric:         Mood and Affect: Mood normal.     Last Recorded Vitals  Blood pressure 125/82, pulse 84, temperature 36.6 °C (97.9 °F), temperature source Oral, resp. rate 18, height 1.549 m (5' 1\"), weight 56 kg (123 lb 7.3 oz), SpO2 98 %.  Intake/Output last 3 Shifts:  I/O last 3 completed shifts:  In: 360 (6.4 mL/kg) [P.O.:360]  Out: - (0 mL/kg)   Weight: 56 kg     Relevant Results           This patient currently has cardiac telemetry ordered; if you would like to modify or discontinue the telemetry order, click here to go to the orders activity to modify/discontinue the order.             Assessment/Plan   89 yoF with hyponatremia on outside labs (was 129 on 9/25 and now 118 on 10/3).     Suspicion for SIADH.    10/5: Na not budging much. Will add salt tabs.     10/6;  HypoNa, ? SIADH.... na still low... cont salt tabs, urea, monitor, f/up renal.   Home regimen for chronic conditions  Dvt px covered with eliquis.   Pt/ot  I believe pt is from SNF.       10/7:  HypoNa... continues to improve s/p lasix and urea yesterday, still on salt tabs.... monitor, cont salt tabs, f/up renal.   Rotator cuff tear on imaging, she did report L shoulder pain yesterday. She nor the DTR recall any fall or injury... analgesia. Pt/ot. "   L hip pain... will check xray.   She has auth for snf per CM... will aim to dc tomorrow or Monday pending renal clearance.      Osteoarthritis of knee  Assessment & Plan  PT to see while here to see if needs to go back to SNF  Will try low-dose oxy    * Hyponatremia  Assessment & Plan  Seemingly from SIADH  Continue fluid restrict  Cymbalta should be stopped  Salt tabs added  Renal following            I spent 35 minutes in the professional and overall care of this patient.      Neil Oropeza MD

## 2023-10-08 LAB
ANION GAP SERPL CALC-SCNC: 9 MMOL/L (ref 10–20)
BUN SERPL-MCNC: 24 MG/DL (ref 6–23)
CALCIUM SERPL-MCNC: 9.1 MG/DL (ref 8.6–10.3)
CHLORIDE SERPL-SCNC: 100 MMOL/L (ref 98–107)
CO2 SERPL-SCNC: 25 MMOL/L (ref 21–32)
CREAT SERPL-MCNC: 0.66 MG/DL (ref 0.5–1.05)
ERYTHROCYTE [DISTWIDTH] IN BLOOD BY AUTOMATED COUNT: 14.6 % (ref 11.5–14.5)
GFR SERPL CREATININE-BSD FRML MDRD: 83 ML/MIN/1.73M*2
GLUCOSE SERPL-MCNC: 86 MG/DL (ref 74–99)
HCT VFR BLD AUTO: 27.5 % (ref 36–46)
HGB BLD-MCNC: 9.4 G/DL (ref 12–16)
MCH RBC QN AUTO: 34.3 PG (ref 26–34)
MCHC RBC AUTO-ENTMCNC: 34.2 G/DL (ref 32–36)
MCV RBC AUTO: 100 FL (ref 80–100)
NRBC BLD-RTO: 0 /100 WBCS (ref 0–0)
PLATELET # BLD AUTO: 380 X10*3/UL (ref 150–450)
PMV BLD AUTO: 9.2 FL (ref 7.5–11.5)
POTASSIUM SERPL-SCNC: 4 MMOL/L (ref 3.5–5.3)
RBC # BLD AUTO: 2.74 X10*6/UL (ref 4–5.2)
SODIUM SERPL-SCNC: 130 MMOL/L (ref 136–145)
WBC # BLD AUTO: 4.6 X10*3/UL (ref 4.4–11.3)

## 2023-10-08 PROCEDURE — 36415 COLL VENOUS BLD VENIPUNCTURE: CPT | Performed by: INTERNAL MEDICINE

## 2023-10-08 PROCEDURE — 85027 COMPLETE CBC AUTOMATED: CPT | Performed by: INTERNAL MEDICINE

## 2023-10-08 PROCEDURE — 80048 BASIC METABOLIC PNL TOTAL CA: CPT | Performed by: INTERNAL MEDICINE

## 2023-10-08 PROCEDURE — 2500000001 HC RX 250 WO HCPCS SELF ADMINISTERED DRUGS (ALT 637 FOR MEDICARE OP): Performed by: HOSPITALIST

## 2023-10-08 PROCEDURE — 1200000002 HC GENERAL ROOM WITH TELEMETRY DAILY

## 2023-10-08 PROCEDURE — 2500000001 HC RX 250 WO HCPCS SELF ADMINISTERED DRUGS (ALT 637 FOR MEDICARE OP): Performed by: INTERNAL MEDICINE

## 2023-10-08 PROCEDURE — 99232 SBSQ HOSP IP/OBS MODERATE 35: CPT | Performed by: INTERNAL MEDICINE

## 2023-10-08 RX ORDER — SODIUM CHLORIDE 1000 MG
1000 TABLET, SOLUBLE MISCELLANEOUS DAILY
Status: DISCONTINUED | OUTPATIENT
Start: 2023-10-09 | End: 2023-10-09 | Stop reason: HOSPADM

## 2023-10-08 RX ADMIN — APIXABAN 5 MG: 5 TABLET, FILM COATED ORAL at 09:55

## 2023-10-08 RX ADMIN — ACETAMINOPHEN 975 MG: 325 TABLET ORAL at 16:35

## 2023-10-08 RX ADMIN — ACETAMINOPHEN 975 MG: 325 TABLET ORAL at 00:15

## 2023-10-08 RX ADMIN — CYANOCOBALAMIN TAB 500 MCG 500 MCG: 500 TAB at 09:55

## 2023-10-08 RX ADMIN — OXYCODONE HYDROCHLORIDE 2.5 MG: 5 TABLET ORAL at 21:29

## 2023-10-08 RX ADMIN — OXYCODONE HYDROCHLORIDE 2.5 MG: 5 TABLET ORAL at 13:12

## 2023-10-08 RX ADMIN — SENNOSIDES 17.2 MG: 8.6 TABLET, FILM COATED ORAL at 21:21

## 2023-10-08 RX ADMIN — ACETAMINOPHEN 975 MG: 325 TABLET ORAL at 09:55

## 2023-10-08 RX ADMIN — SENNOSIDES 17.2 MG: 8.6 TABLET, FILM COATED ORAL at 09:55

## 2023-10-08 RX ADMIN — Medication 25 MCG: at 09:55

## 2023-10-08 RX ADMIN — SODIUM CHLORIDE 1 G: 1 TABLET ORAL at 09:55

## 2023-10-08 RX ADMIN — APIXABAN 5 MG: 5 TABLET, FILM COATED ORAL at 21:21

## 2023-10-08 ASSESSMENT — PAIN SCALES - GENERAL
PAINLEVEL_OUTOF10: 7
PAINLEVEL_OUTOF10: 7
PAINLEVEL_OUTOF10: 8
PAINLEVEL_OUTOF10: 0 - NO PAIN
PAINLEVEL_OUTOF10: 3
PAINLEVEL_OUTOF10: 3

## 2023-10-08 ASSESSMENT — COGNITIVE AND FUNCTIONAL STATUS - GENERAL
HELP NEEDED FOR BATHING: A LOT
STANDING UP FROM CHAIR USING ARMS: A LOT
TOILETING: A LOT
WALKING IN HOSPITAL ROOM: A LOT
DRESSING REGULAR LOWER BODY CLOTHING: TOTAL
TURNING FROM BACK TO SIDE WHILE IN FLAT BAD: A LOT
DAILY ACTIVITIY SCORE: 13
DRESSING REGULAR UPPER BODY CLOTHING: A LOT
MOVING TO AND FROM BED TO CHAIR: A LOT
PERSONAL GROOMING: A LITTLE
EATING MEALS: A LITTLE
CLIMB 3 TO 5 STEPS WITH RAILING: A LOT
MOVING FROM LYING ON BACK TO SITTING ON SIDE OF FLAT BED WITH BEDRAILS: A LOT
MOBILITY SCORE: 12

## 2023-10-08 ASSESSMENT — PAIN - FUNCTIONAL ASSESSMENT
PAIN_FUNCTIONAL_ASSESSMENT: 0-10

## 2023-10-08 NOTE — CARE PLAN
The patient's goals for the shift include pain will be managed    The clinical goals for the shift include pt will remain fall free

## 2023-10-08 NOTE — PROGRESS NOTES
"Cassia Humphries is a 89 y.o. female on day 5 of admission presenting with Hyponatremia.    Subjective   On room air, afebrile, no overnight events reported. Tolerating PO intake. L hip xray without acute pathology.        Objective     Physical Exam  Cardiovascular:      Rate and Rhythm: Normal rate and regular rhythm.      Heart sounds: Normal heart sounds.   Pulmonary:      Breath sounds: Normal breath sounds.   Abdominal:      General: Bowel sounds are normal.      Palpations: Abdomen is soft.   Musculoskeletal:         General: Normal range of motion.   Neurological:      General: No focal deficit present.      Mental Status: She is alert and oriented to person, place, and time.      Comments:   Psychiatric:         Mood and Affect: Mood normal.     Last Recorded Vitals  Blood pressure 139/65, pulse 64, temperature 36.3 °C (97.3 °F), resp. rate 17, height 1.549 m (5' 1\"), weight 56 kg (123 lb 7.3 oz), SpO2 97 %.  Intake/Output last 3 Shifts:  I/O last 3 completed shifts:  In: 240 (4.3 mL/kg) [P.O.:240]  Out: - (0 mL/kg)   Weight: 56 kg     Relevant Results           This patient currently has cardiac telemetry ordered; if you would like to modify or discontinue the telemetry order, click here to go to the orders activity to modify/discontinue the order.             Assessment/Plan   89 yoF with hyponatremia on outside labs (was 129 on 9/25 and now 118 on 10/3).     Suspicion for SIADH.    10/5: Na not budging much. Will add salt tabs.     10/6;  HypoNa, ? SIADH.... na still low... cont salt tabs, urea, monitor, f/up renal.   Home regimen for chronic conditions  Dvt px covered with eliquis.   Pt/ot  I believe pt is from SNF.       10/7:  HypoNa... continues to improve s/p lasix and urea yesterday, still on salt tabs.... monitor, cont salt tabs, f/up renal.   Rotator cuff tear on imaging, she did report L shoulder pain yesterday. She nor the DTR recall any fall or injury... analgesia. Pt/ot.   L hip pain... will " check xray.   She has auth for snf per CM... will aim to dc tomorrow or Monday pending renal clearance.      10/8:  HypoNa... continues to improve, now 130. Likely near her baseline.... f/up renal, can probably start reducing salt tabs.   L hip xray w/o acute pathology.   Dc to snf pending placement and renal clearance... likely ready today or tomorrow.       Osteoarthritis of knee  Assessment & Plan  PT to see while here to see if needs to go back to SNF  Will try low-dose oxy    * Hyponatremia  Assessment & Plan  Seemingly from SIADH  Continue fluid restrict  Cymbalta should be stopped  Salt tabs added  Renal following            I spent 35 minutes in the professional and overall care of this patient.      Neil Oropeza MD

## 2023-10-08 NOTE — PROGRESS NOTES
"Cassia Humphries is a 89 y.o. female on day 5 of admission presenting with Hyponatremia.    Subjective   Seen and examined.  Sitting up in a chair.   Awake and lucid. No acute events.  She does not offer specific complaints.         Objective     Physical Exam  Constitutional:       Appearance: Normal appearance.   HENT:      Head: Normocephalic.      Mouth/Throat:      Mouth: Mucous membranes are moist.      Pharynx: Oropharynx is clear.   Eyes:      Conjunctiva/sclera: Conjunctivae normal.   Cardiovascular:      Rate and Rhythm: Normal rate.   Pulmonary:      Effort: Pulmonary effort is normal.      Breath sounds: Normal breath sounds.   Abdominal:      General: Bowel sounds are normal.      Palpations: Abdomen is soft.   Musculoskeletal:         General: Normal range of motion.      Right lower leg: Edema present.      Left lower leg: Edema present.   Skin:     General: Skin is warm and dry.   Neurological:      General: No focal deficit present.      Mental Status: She is alert and oriented to person, place, and time.       Last Recorded Vitals  Blood pressure 132/84, pulse 62, temperature 36.3 °C (97.3 °F), resp. rate 18, height 1.549 m (5' 1\"), weight 56 kg (123 lb 7.3 oz), SpO2 97 %.  Intake/Output last 3 Shifts:  I/O last 3 completed shifts:  In: 240 (4.3 mL/kg) [P.O.:240]  Out: - (0 mL/kg)   Weight: 56 kg     Relevant Results      Lab Results   Component Value Date    WBC 4.6 10/08/2023    HGB 9.4 (L) 10/08/2023    HCT 27.5 (L) 10/08/2023     10/08/2023     10/08/2023     Lab Results   Component Value Date    GLUCOSE 86 10/08/2023    CALCIUM 9.1 10/08/2023     (L) 10/08/2023    K 4.0 10/08/2023    CO2 25 10/08/2023     10/08/2023    BUN 24 (H) 10/08/2023    CREATININE 0.66 10/08/2023        Assessment/Plan   Principal Problem:    Hyponatremia  Active Problems:    Anemia    Benign essential hypertension    Osteoarthritis of knee    Other chronic pain    VTE (venous " thromboembolism)    Rotator cuff arthropathy of both shoulders    Cassia Humphries is a 89 y.o. female with a past medical history of hypertension, rectal bleeding, chronic hyponatremia, depression, venous insufficiency, osteoarthritis with a recent history of right lower limb DVT, pulmonary embolus and atrial fibrillation back in late August.  She comes in now from a skilled nursing facility for evaluation of acute on chronic hyponatremia with a plasma sodium of 118 mmol/a liter.    She was seen by Dr. Preston.  Hyponatremia work-up is notable for a Mariam 117, Uosm 419, serum osm 254, Uacid 3.9.  TSH was within normal limits.  She is noted to have chronic hyponatremia albeit not to this extent.  She was recently prescribed Cymbalta but her hyponatremia predates this. She is currently not on thiazides, further antidepressants/anticonvulsants or anti-inflammatories. Her clinical picture is consistent with SIADH. Her daughter also notes poor solute intake. She is currently on a 1.2 L fluid restriction which She has received sodium chloride tablets as well as a dose of urea.  Her sodium is up to 130 mmol/liter. She has lower limb edema. Most recent 2 D ECHO shows a preserved EF with right heart disease but this was in the setting of an acute PE. But we must caution sodium chloride tablets. I will cut her back to 1 g daily and encourage solute intake. She is off the Cymbalta and I held the NSAID. We will send her out with oral Lasix 20 mg PRN for worsening leg edema. Trend her RFP. Will follow.      I spent 45 minutes in the professional and overall care of this patient.      Alfredo Watkins, DO

## 2023-10-09 ENCOUNTER — APPOINTMENT (OUTPATIENT)
Dept: PRIMARY CARE | Facility: CLINIC | Age: 88
End: 2023-10-09
Payer: MEDICARE

## 2023-10-09 VITALS
HEIGHT: 61 IN | HEART RATE: 86 BPM | OXYGEN SATURATION: 96 % | RESPIRATION RATE: 18 BRPM | BODY MASS INDEX: 23.31 KG/M2 | TEMPERATURE: 97.5 F | WEIGHT: 123.46 LBS | SYSTOLIC BLOOD PRESSURE: 129 MMHG | DIASTOLIC BLOOD PRESSURE: 83 MMHG

## 2023-10-09 LAB
ANION GAP SERPL CALC-SCNC: 13 MMOL/L (ref 10–20)
BUN SERPL-MCNC: 18 MG/DL (ref 6–23)
CALCIUM SERPL-MCNC: 9.3 MG/DL (ref 8.6–10.3)
CHLORIDE SERPL-SCNC: 101 MMOL/L (ref 98–107)
CO2 SERPL-SCNC: 24 MMOL/L (ref 21–32)
CREAT SERPL-MCNC: 0.67 MG/DL (ref 0.5–1.05)
ERYTHROCYTE [DISTWIDTH] IN BLOOD BY AUTOMATED COUNT: 14.6 % (ref 11.5–14.5)
GFR SERPL CREATININE-BSD FRML MDRD: 83 ML/MIN/1.73M*2
GLUCOSE SERPL-MCNC: 81 MG/DL (ref 74–99)
HCT VFR BLD AUTO: 28.5 % (ref 36–46)
HGB BLD-MCNC: 9.5 G/DL (ref 12–16)
MCH RBC QN AUTO: 33.8 PG (ref 26–34)
MCHC RBC AUTO-ENTMCNC: 33.3 G/DL (ref 32–36)
MCV RBC AUTO: 101 FL (ref 80–100)
NRBC BLD-RTO: 0 /100 WBCS (ref 0–0)
PLATELET # BLD AUTO: 402 X10*3/UL (ref 150–450)
PMV BLD AUTO: 9.1 FL (ref 7.5–11.5)
POTASSIUM SERPL-SCNC: 4.5 MMOL/L (ref 3.5–5.3)
RBC # BLD AUTO: 2.81 X10*6/UL (ref 4–5.2)
SODIUM SERPL-SCNC: 133 MMOL/L (ref 136–145)
WBC # BLD AUTO: 4.4 X10*3/UL (ref 4.4–11.3)

## 2023-10-09 PROCEDURE — 36415 COLL VENOUS BLD VENIPUNCTURE: CPT | Performed by: INTERNAL MEDICINE

## 2023-10-09 PROCEDURE — 97116 GAIT TRAINING THERAPY: CPT | Mod: GP

## 2023-10-09 PROCEDURE — 97535 SELF CARE MNGMENT TRAINING: CPT | Mod: GO

## 2023-10-09 PROCEDURE — 99239 HOSP IP/OBS DSCHRG MGMT >30: CPT | Performed by: INTERNAL MEDICINE

## 2023-10-09 PROCEDURE — 2500000001 HC RX 250 WO HCPCS SELF ADMINISTERED DRUGS (ALT 637 FOR MEDICARE OP): Performed by: INTERNAL MEDICINE

## 2023-10-09 PROCEDURE — 97530 THERAPEUTIC ACTIVITIES: CPT | Mod: GO

## 2023-10-09 PROCEDURE — 2500000001 HC RX 250 WO HCPCS SELF ADMINISTERED DRUGS (ALT 637 FOR MEDICARE OP): Performed by: HOSPITALIST

## 2023-10-09 PROCEDURE — 80048 BASIC METABOLIC PNL TOTAL CA: CPT | Performed by: INTERNAL MEDICINE

## 2023-10-09 PROCEDURE — 85027 COMPLETE CBC AUTOMATED: CPT | Performed by: INTERNAL MEDICINE

## 2023-10-09 PROCEDURE — 2500000004 HC RX 250 GENERAL PHARMACY W/ HCPCS (ALT 636 FOR OP/ED): Performed by: INTERNAL MEDICINE

## 2023-10-09 RX ORDER — FUROSEMIDE 20 MG/1
20 TABLET ORAL DAILY PRN
Qty: 30 TABLET | Refills: 0 | Status: SHIPPED | OUTPATIENT
Start: 2023-10-09 | End: 2023-12-23 | Stop reason: HOSPADM

## 2023-10-09 RX ORDER — SODIUM CHLORIDE 1000 MG
1000 TABLET, SOLUBLE MISCELLANEOUS DAILY
Qty: 30 TABLET | Refills: 0
Start: 2023-10-09 | End: 2023-11-08

## 2023-10-09 RX ORDER — LISINOPRIL 10 MG/1
TABLET ORAL
Qty: 30 TABLET | Refills: 0
Start: 2023-10-09 | End: 2023-10-30 | Stop reason: ALTCHOICE

## 2023-10-09 RX ORDER — DEXTROMETHORPHAN POLISTIREX 30 MG/5 ML
1 SUSPENSION, EXTENDED RELEASE 12 HR ORAL DAILY PRN
Qty: 133 ML | Refills: 0 | Status: SHIPPED | OUTPATIENT
Start: 2023-10-09 | End: 2023-10-30 | Stop reason: ALTCHOICE

## 2023-10-09 RX ORDER — DEXTROMETHORPHAN POLISTIREX 30 MG/5 ML
1 SUSPENSION, EXTENDED RELEASE 12 HR ORAL ONCE
Status: DISCONTINUED | OUTPATIENT
Start: 2023-10-09 | End: 2023-10-09

## 2023-10-09 RX ORDER — ONDANSETRON HYDROCHLORIDE 2 MG/ML
4 INJECTION, SOLUTION INTRAVENOUS EVERY 6 HOURS PRN
Status: DISCONTINUED | OUTPATIENT
Start: 2023-10-09 | End: 2023-10-09 | Stop reason: HOSPADM

## 2023-10-09 RX ORDER — SENNOSIDES 8.6 MG/1
2 TABLET ORAL 2 TIMES DAILY
Qty: 120 TABLET | Refills: 0
Start: 2023-10-09 | End: 2023-11-08

## 2023-10-09 RX ORDER — POLYETHYLENE GLYCOL 3350 17 G/17G
17 POWDER, FOR SOLUTION ORAL 2 TIMES DAILY
Status: DISCONTINUED | OUTPATIENT
Start: 2023-10-09 | End: 2023-10-09 | Stop reason: HOSPADM

## 2023-10-09 RX ORDER — DEXTROMETHORPHAN POLISTIREX 30 MG/5 ML
1 SUSPENSION, EXTENDED RELEASE 12 HR ORAL ONCE
Status: COMPLETED | OUTPATIENT
Start: 2023-10-09 | End: 2023-10-09

## 2023-10-09 RX ORDER — BISACODYL 5 MG
10 TABLET, DELAYED RELEASE (ENTERIC COATED) ORAL ONCE
Status: COMPLETED | OUTPATIENT
Start: 2023-10-09 | End: 2023-10-09

## 2023-10-09 RX ADMIN — BISACODYL 10 MG: 5 TABLET, COATED ORAL at 09:17

## 2023-10-09 RX ADMIN — CYANOCOBALAMIN TAB 500 MCG 500 MCG: 500 TAB at 09:17

## 2023-10-09 RX ADMIN — Medication 25 MCG: at 09:18

## 2023-10-09 RX ADMIN — OXYCODONE HYDROCHLORIDE 2.5 MG: 5 TABLET ORAL at 11:24

## 2023-10-09 RX ADMIN — ONDANSETRON 4 MG: 2 INJECTION INTRAMUSCULAR; INTRAVENOUS at 11:24

## 2023-10-09 RX ADMIN — ACETAMINOPHEN 975 MG: 325 TABLET ORAL at 09:17

## 2023-10-09 RX ADMIN — APIXABAN 5 MG: 5 TABLET, FILM COATED ORAL at 09:18

## 2023-10-09 RX ADMIN — Medication 1 ENEMA: at 12:03

## 2023-10-09 ASSESSMENT — PAIN DESCRIPTION - DESCRIPTORS
DESCRIPTORS: SHARP
DESCRIPTORS: ACHING

## 2023-10-09 ASSESSMENT — COGNITIVE AND FUNCTIONAL STATUS - GENERAL
MOVING FROM LYING ON BACK TO SITTING ON SIDE OF FLAT BED WITH BEDRAILS: A LITTLE
HELP NEEDED FOR BATHING: A LOT
MOVING TO AND FROM BED TO CHAIR: A LITTLE
DRESSING REGULAR LOWER BODY CLOTHING: TOTAL
EATING MEALS: A LITTLE
EATING MEALS: A LOT
TOILETING: A LOT
WALKING IN HOSPITAL ROOM: A LITTLE
MOVING FROM LYING ON BACK TO SITTING ON SIDE OF FLAT BED WITH BEDRAILS: A LITTLE
MOBILITY SCORE: 16
TURNING FROM BACK TO SIDE WHILE IN FLAT BAD: A LOT
TURNING FROM BACK TO SIDE WHILE IN FLAT BAD: A LOT
DRESSING REGULAR UPPER BODY CLOTHING: A LOT
MOVING TO AND FROM BED TO CHAIR: A LITTLE
DRESSING REGULAR LOWER BODY CLOTHING: TOTAL
MOBILITY SCORE: 16
WALKING IN HOSPITAL ROOM: A LOT
CLIMB 3 TO 5 STEPS WITH RAILING: A LOT
DAILY ACTIVITIY SCORE: 14
CLIMB 3 TO 5 STEPS WITH RAILING: A LOT
DAILY ACTIVITIY SCORE: 11
MOVING FROM LYING ON BACK TO SITTING ON SIDE OF FLAT BED WITH BEDRAILS: A LITTLE
MOBILITY SCORE: 13
DRESSING REGULAR UPPER BODY CLOTHING: A LOT
STANDING UP FROM CHAIR USING ARMS: A LITTLE
WALKING IN HOSPITAL ROOM: A LITTLE
PERSONAL GROOMING: A LITTLE
PERSONAL GROOMING: A LITTLE
CLIMB 3 TO 5 STEPS WITH RAILING: A LOT
TOILETING: A LITTLE
PERSONAL GROOMING: A LOT
DRESSING REGULAR UPPER BODY CLOTHING: A LOT
TOILETING: A LITTLE
EATING MEALS: A LITTLE
TURNING FROM BACK TO SIDE WHILE IN FLAT BAD: A LOT
MOVING TO AND FROM BED TO CHAIR: A LOT
DAILY ACTIVITIY SCORE: 14
STANDING UP FROM CHAIR USING ARMS: A LITTLE
HELP NEEDED FOR BATHING: A LOT
HELP NEEDED FOR BATHING: A LOT
STANDING UP FROM CHAIR USING ARMS: A LOT
DRESSING REGULAR LOWER BODY CLOTHING: TOTAL

## 2023-10-09 ASSESSMENT — PAIN SCALES - GENERAL
PAINLEVEL_OUTOF10: 8
PAINLEVEL_OUTOF10: 2

## 2023-10-09 ASSESSMENT — PAIN SCALES - PAIN ASSESSMENT IN ADVANCED DEMENTIA (PAINAD)
BODYLANGUAGE: RELAXED
CONSOLABILITY: NO NEED TO CONSOLE

## 2023-10-09 ASSESSMENT — ACTIVITIES OF DAILY LIVING (ADL): HOME_MANAGEMENT_TIME_ENTRY: 14

## 2023-10-09 ASSESSMENT — PAIN - FUNCTIONAL ASSESSMENT
PAIN_FUNCTIONAL_ASSESSMENT: 0-10
PAIN_FUNCTIONAL_ASSESSMENT: 0-10

## 2023-10-09 NOTE — PROGRESS NOTES
"Cassia Humphries is a 89 y.o. female on day 6 of admission presenting with Hyponatremia.    Subjective   Seen and examined.  Sitting up in a chair. Her daughter is present at bedside.   Awake and lucid. No acute events.          Objective     Physical Exam  Constitutional:       Appearance: Normal appearance.   HENT:      Head: Normocephalic.      Mouth/Throat:      Mouth: Mucous membranes are moist.      Pharynx: Oropharynx is clear.   Eyes:      Conjunctiva/sclera: Conjunctivae normal.   Cardiovascular:      Rate and Rhythm: Normal rate.   Pulmonary:      Effort: Pulmonary effort is normal.      Breath sounds: Normal breath sounds.   Abdominal:      General: Bowel sounds are normal.      Palpations: Abdomen is soft.   Musculoskeletal:         General: Normal range of motion.      Right lower leg: Edema present.      Left lower leg: Edema present.   Skin:     General: Skin is warm and dry.   Neurological:      General: No focal deficit present.      Mental Status: She is alert and oriented to person, place, and time.       Last Recorded Vitals  Blood pressure 145/76, pulse 65, temperature 36.4 °C (97.6 °F), temperature source Oral, resp. rate 18, height 1.549 m (5' 1\"), weight 56 kg (123 lb 7.3 oz), SpO2 96 %.  Intake/Output last 3 Shifts:  I/O last 3 completed shifts:  In: 840 (15 mL/kg) [P.O.:840]  Out: - (0 mL/kg)   Weight: 56 kg     Relevant Results      Lab Results   Component Value Date    WBC 4.4 10/09/2023    HGB 9.5 (L) 10/09/2023    HCT 28.5 (L) 10/09/2023     (H) 10/09/2023     10/09/2023     Lab Results   Component Value Date    GLUCOSE 81 10/09/2023    CALCIUM 9.3 10/09/2023     (L) 10/09/2023    K 4.5 10/09/2023    CO2 24 10/09/2023     10/09/2023    BUN 18 10/09/2023    CREATININE 0.67 10/09/2023        Assessment/Plan   Principal Problem:    Hyponatremia  Active Problems:    Anemia    Benign essential hypertension    Osteoarthritis of knee    Other chronic pain    VTE " (venous thromboembolism)    Rotator cuff arthropathy of both shoulders    Cassia Humphries is a 89 y.o. female with a past medical history of hypertension, rectal bleeding, chronic hyponatremia, depression, venous insufficiency, osteoarthritis with a recent history of right lower limb DVT, pulmonary embolus and atrial fibrillation back in late August.  She comes in now from a skilled nursing facility for evaluation of acute on chronic hyponatremia with a plasma sodium of 118 mmol/a liter.    She was seen by Dr. Preston.  Hyponatremia work-up is notable for a Mariam 117, Uosm 419, serum osm 254, Uacid 3.9.  TSH was within normal limits.  She is noted to have chronic hyponatremia albeit not to this extent.  She was recently prescribed Cymbalta but her hyponatremia predates this. She is currently not on thiazides and antidepressants or anticonvulsants. Her clinical picture is consistent with SIADH. Her daughter also notes poor solute intake. She is currently on a 1.2 L fluid restriction. She has received sodium chloride tablets as well as a dose of urea.  Her sodium is up to 133 mmol/liter. She has lower limb edema. Most recent 2 D ECHO shows a preserved EF with right heart strain but this was in the setting of an acute PE. But we must caution sodium chloride tablets. I cut her back to 1 g daily and have encouraged solute intake. She is off the Cymbalta and I held the NSAIDs. We will send her out on a 1.4 L fluid restriction, a 1 g daily of sodium chloride tab and oral Lasix 20 mg PRN for worsening leg edema. She will have blood work weekly at the skilled nursing facility. Please have her RFP faxed to me at [phone].     I spent 35 minutes in the professional and overall care of this patient.      Alfredo Watkins, DO

## 2023-10-09 NOTE — PROGRESS NOTES
Cassia Humphries is a 89 y.o. female on day 6 of admission presenting with Hyponatremia.    SW went to pt's  room to have her  sign IMM. Pt signed. While in room dtr Charlette Humphries  ([phone]) said she  was  getting  messaged  about  her  mom's  procedures  in the  hospital  like the pt  was  outpatient. Charlette  said she  thought  her mom was IP. ANDRE  reviewed the  chart  and it  does  show pt has been  IP since  10/3. Charlette encouraged to  follow  up with  the  finance  dept  who may be further able to  look at  billing.       LEO Lawrence

## 2023-10-09 NOTE — PROGRESS NOTES
Care Coordinator Note:  Cassia Humphries is a 89 y.o. female on day 6 of admission presenting with Hyponatremia.  Patient is medically cleared for discharge.  Requested PCN set up transportation for [facility].  Daughter and patient aware of discharge.  Nurse to call report to [phone].  Etelvina Bardales RN TCC

## 2023-10-09 NOTE — CARE PLAN
Problem: Skin  Goal: Participates in plan/prevention/treatment measures  Outcome: Progressing  Goal: Prevent/manage excess moisture  Outcome: Progressing  Goal: Prevent/minimize sheer/friction injuries  Outcome: Progressing  Goal: Promote/optimize nutrition  Outcome: Progressing  Goal: Promote skin healing  Outcome: Progressing     Problem: Pain  Goal: Takes deep breaths with improved pain control throughout the shift  Outcome: Progressing  Goal: Turns in bed with improved pain control throughout the shift  Outcome: Progressing  Goal: Walks with improved pain control throughout the shift  Outcome: Progressing  Goal: Performs ADL's with improved pain control throughout shift  Outcome: Progressing  Goal: Participates in PT with improved pain control throughout the shift  Outcome: Progressing  Goal: Free from opioid side effects throughout the shift  Outcome: Progressing  Goal: Free from acute confusion related to pain meds throughout the shift  Outcome: Progressing     Problem: Pain - Adult  Goal: Verbalizes/displays adequate comfort level or baseline comfort level  Outcome: Progressing     Problem: Safety - Adult  Goal: Free from fall injury  Outcome: Progressing     Problem: Discharge Planning  Goal: Discharge to home or other facility with appropriate resources  Outcome: Progressing     Problem: Chronic Conditions and Co-morbidities  Goal: Patient's chronic conditions and co-morbidity symptoms are monitored and maintained or improved  Outcome: Progressing     Problem: Bathing  Goal: STG - Patient will bathe body  Outcome: Progressing     Problem: Dressings Lower Extremities  Goal: STG - Patient will complete lower body dressing  Outcome: Progressing     Problem: Dressing Upper Extremities  Goal: STG - Patient will dress upper body  Outcome: Progressing     Problem: Patient is at risk for or has history of falls  Goal: Patient will not sustain a fall as a result of home safety mitigation  Outcome: Progressing    The patient's goals for the shift include pain will be managed    The clinical goals for the shift include pt will remain fall free

## 2023-10-09 NOTE — CARE PLAN
The patient's goals for the shift include pain will be managed    The clinical goals for the shift include pt will remain fall free    O

## 2023-10-09 NOTE — CARE PLAN
Problem: Balance  Goal: Pt performs all sitting balance with IND and standing balance with min assist and appropriate assistive device  Outcome: Progressing     Problem: Mobility  Goal: STG - Patient will ambulate >50 ft with CGA or better and appropriate assistive device  Outcome: Progressing     Problem: Safety  Goal: LTG - Patient will demonstrate safety requirements appropriate to situation/environment  Outcome: Progressing     Problem: Transfers  Goal: STG - Patient to transfer to and from sit to supine with SBA  Outcome: Progressing  Goal: STG - Patient will transfer sit to and from stand with min assist x 1 and appropriate assistive device  Outcome: Met     Problem: Strength  Goal: Pt demonstrates BLE MMT grade of at least 4/5 in all MMT tested during initial PT evalaution  Outcome: Progressing

## 2023-10-09 NOTE — PROGRESS NOTES
Physical Therapy    Physical Therapy Treatment    Patient Name: Cassia Humphries  MRN: [id number]  Today's Date: 10/9/2023  Time Calculation  Start Time: 1036  Stop Time: 1055  Time Calculation (min): 19 min       Assessment/Plan   PT Assessment  PT Assessment Results: Decreased strength, Decreased range of motion, Decreased endurance, Impaired balance, Decreased mobility, Decreased safety awareness, Pain  Rehab Prognosis: Good  Barriers to Discharge: Elevated biIateral knee and back pain  Evaluation/Treatment Tolerance: Patient limited by pain  Medical Staff Made Aware: Yes  Strengths: Support of extended family/friends, Rehab experience  Barriers to Participation:  (Bilateral knee pain)  End of Session Communication: Bedside nurse  Assessment Comment: Pt presents today with elevated bilateral knee and lower back pain as well as decreased balance with abnormalities of gait. Pt was able to progress ambulation distance wtoday as compared with previous session with PT. Pt continues to be limited by pain and fatigue with standing mobility. Continued PT during the current admission would benefit the pt to increase tolerance to standing activity as well as for more gait training.  End of Session Patient Position: Up in chair, Alarm off, not on at start of session (Daughter present)  PT Plan  Inpatient/Swing Bed or Outpatient: Inpatient  PT Plan  Treatment/Interventions: Bed mobility, Transfer training, Gait training, Balance training, Strengthening, Endurance training, Therapeutic exercise, Therapeutic activity, Home exercise program  PT Plan: Skilled PT  PT Frequency: 3 times per week  PT Discharge Recommendations: Moderate intensity level of continued care  PT Recommended Transfer Status: Assistive device, Contact guard      General Visit Information:   PT  Visit  PT Received On: 10/09/23  Response to Previous Treatment:  (Pt reporting pain in lower back and BLE but agreeable to ambulation with PT)  General  Reason for  Referral: hyponatremia; weakness  Referred By: CORIE Shrestha  Past Medical History Relevant to Rehab: chronic hyponatremia, HTN, rectal bleed, depression, OA  Family/Caregiver Present: Yes (Daughter)  Caregiver Feedback: Pt's daughter reports ambulating with pt out into villar.  Prior to Session Communication: Bedside nurse  Patient Position Received: Up in chair, Alarm off, not on at start of session  Preferred Learning Style: kinesthetic, verbal, visual  General Comment: Pt up in bedside chair upon PT arrival. Cleared to participate with RN and agreeable to PT treatment    Subjective   Precautions:  Precautions  Medical Precautions: Fall precautions    Objective   Pain:  Pain Assessment  Pain Assessment: 0-10  Pain Score:  (pt reports 20/10 pain in lower back and BLE. Pt also endorses headache while sitting in chair)  Pain Location: Head (BLE)  Pain Orientation: Right, Left  Pain Descriptors: Sharp  Cognition:  Cognition  Overall Cognitive Status: Within Functional Limits  Attention: Within Functional Limits  Postural Control:  Postural Control  Postural Control: Impaired  Posture Comment: Pt demonstrates valgus positioning of left knee and forward flexion of trunk in standing.  Extremity/Trunk Assessments:  RLE : Exceptions to WFL  Strength RLE  RLE Overall Strength: Greater than or equal to 3/5 as evidenced by functional mobility    LLE : Exceptions to WFL  Strength LLE  LLE Overall Strength: Greater than or equal to 3/5 as evidenced by functional mobility    Activity Tolerance:  Activity Tolerance  Endurance: Tolerates 10 - 20 min exercise with multiple rests  Treatments:  Ambulation/Gait Training  Ambulation/Gait Training Performed: Yes  Ambulation/Gait Training 1  Surface 1: Level tile  Device 1: Rolling walker  Gait Support Devices: Gait belt  Assistance 1: Contact guard, Moderate verbal cues  Comments/Distance (ft) 1: About 30 ft x 1, then 10 ft x 1. Decreased anitha and step length. RLE in increased flexion  trhoughout ambulation. Pt in prominent forward flexion at trunk throughout ambulation only slightly improved with VC. 3 standing rest breaks d/t pain and nausea.  Transfers  Transfer: Yes  Transfer 1  Transfer From 1: Sit to  Transfer to 1: Stand  Technique 1: Sit to stand, Stand to sit  Transfer Device 1: Walker, Gait belt  Transfer Level of Assistance 1: Contact guard  Trials/Comments 1: Pt able to initiate BUE push from armrests of chair to stand. Increased time spend in forward flexion and increased time to reach standing. Pt endorses nauseous feeling once standing. RN aware. VC to stay with walker until ready to sit. Pt able to intiate BUE reach for armrests of chair with good eccentric control upon descent to chair.  Transfers 2  Transfer From 2: Stand to  Transfer to 2: Chair with arms  Technique 2: Stand to sit  Transfer Device 2: Walker (gait belt)  Transfer Level of Assistance 2: Contact guard  Trials/Comments 2: VC to position BLE against chair and for UE reach for armrest upon sitting. Good eccentric control upon sitting.    Outcome Measures:  [facility] Basic Mobility  Turning from your back to your side while in a flat bed without using bedrails: A little  Moving from lying on your back to sitting on the side of a flat bed without using bedrails: A lot  Moving to and from bed to chair (including a wheelchair): A little  Standing up from a chair using your arms (e.g. wheelchair or bedside chair): A little  To walk in hospital room: A little  Climbing 3-5 steps with railing: A lot  Basic Mobility - Total Score: 16    Education Documentation  Precautions, taught by Yury Friedman PT at 10/9/2023 11:18 AM.  Learner: Family, Patient  Readiness: Acceptance  Method: Explanation, Demonstration  Response: Verbalizes Understanding    Body Mechanics, taught by Yury Friedman PT at 10/9/2023 11:18 AM.  Learner: Family, Patient  Readiness: Acceptance  Method: Explanation, Demonstration  Response: Verbalizes  Understanding    Mobility Training, taught by Yury Friedman, PT at 10/9/2023 11:18 AM.  Learner: Family, Patient  Readiness: Acceptance  Method: Explanation, Demonstration  Response: Verbalizes Understanding    Education Comments  No comments found.        OP EDUCATION:       Encounter Problems       Encounter Problems (Active)       Balance       STG - Maintains dynamic sitting balance with upper extremity support (Progressing)       Start:  10/05/23    Expected End:  10/20/23       Practice sitting on the edge of a bed with back support and set up to complete ADL tasks upon discharge.            Balance       Pt performs all sitting balance with IND and standing balance with min assist and appropriate assistive device (Progressing)       Start:  10/05/23    Expected End:  10/19/23               Mobility       STG - Patient will ambulate >50 ft with CGA or better and appropriate assistive device (Progressing)       Start:  10/05/23    Expected End:  10/19/23               Pain - Adult          Safety       LTG - Patient will demonstrate safety requirements appropriate to situation/environment (Progressing)       Start:  10/05/23    Expected End:  10/19/23               Strength       Pt demonstrates BLE MMT grade of at least 4/5 in all MMT tested during initial PT evalaution (Progressing)       Start:  10/05/23    Expected End:  10/19/23               Transfers       STG - Transfer from bed to chair (Progressing)       Start:  10/05/23    Expected End:  10/20/23       Pt. Will complete Min A with LRAD and gait belt and FWW to safely complete bed/chair transfer upon discharge.         STG - Patient to transfer to and from sit to supine (Progressing)       Start:  10/05/23    Expected End:  10/20/23       Pt. Required distant sup. to safely complete sit/supine increased time d/t pain upon dc         STG - Patient will transfer sit to and from stand (Progressing)       Start:  10/05/23    Expected End:  10/20/23        Pt. Will safely transfer from sit/stand with close supervision and use of FWW upon d/c         STG - Patient will perform toilet transfer (Progressing)       Start:  10/05/23    Expected End:  10/20/23       Pt. Will complete safe functional toilet transfer with use of FWW and grab bar as able with close supervision upon discharge.         STG - Patient will perform tub/shower transfer (Progressing)       Start:  10/05/23    Expected End:  10/20/23       Pt. Will safely complete shower transfers with close supervision and use of shower chair and grab bar upon dc            Transfers       STG - Patient to transfer to and from sit to supine with SBA (Progressing)       Start:  10/05/23    Expected End:  10/19/23            STG - Patient will transfer sit to and from stand with min assist x 1 and appropriate assistive device (Met)       Start:  10/05/23    Expected End:  10/19/23    Resolved:  10/09/23

## 2023-10-09 NOTE — CARE PLAN
Problem: Balance  Goal: STG - Maintains dynamic sitting balance with upper extremity support  Description: Practice sitting on the edge of a bed with back support and set up to complete ADL tasks upon discharge.  Outcome: Progressing     Problem: Bathing  Goal: STG - Patient will bathe body  Description: Pt. Will complete bathing task while seated with use of shower chair with back support with Min A upon d/c  Outcome: Progressing     Problem: Dressings Lower Extremities  Goal: STG - Patient will complete lower body dressing  Description: Pt. Will complete LBD task while seated supported with use AE to increase independence with distant sup upon discharge.  Outcome: Progressing     Problem: Dressing Upper Extremities  Description: Pt. Will complete UBD with close supervision while seated supported upon discharge.  Goal: STG - Patient will dress upper body  Outcome: Progressing     Problem: Transfers  Goal: STG - Transfer from bed to chair  Description: Pt. Will complete Min A with LRAD and gait belt and FWW to safely complete bed/chair transfer upon discharge.  Outcome: Progressing  Goal: STG - Patient to transfer to and from sit to supine  Description: Pt. Required distant sup. to safely complete sit/supine increased time d/t pain upon dc  Outcome: Progressing  Goal: STG - Patient will transfer sit to and from stand  Description: Pt. Will safely transfer from sit/stand with close supervision and use of FWW upon d/c  Outcome: Progressing  Goal: STG - Patient will perform toilet transfer  Description: Pt. Will complete safe functional toilet transfer with use of FWW and grab bar as able with close supervision upon discharge.  Outcome: Progressing  Goal: STG - Patient will perform tub/shower transfer  Description: Pt. Will safely complete shower transfers with close supervision and use of shower chair and grab bar upon dc  Outcome: Progressing

## 2023-10-09 NOTE — PROGRESS NOTES
Occupational Therapy    OT Treatment    Patient Name: Cassia Humphries  MRN: [id number]  Today's Date: 10/9/2023  Time Calculation  Start Time: 0957  Stop Time: 1021  Time Calculation (min): 24 min         Assessment:  OT Assessment: Pt. required Mod/Min A for all functional mobilty wit huse of FWW. Pt. requreid Max A to don undergarments and pants  Prognosis: Good  Evaluation/Treatment Tolerance: Patient limited by pain, Patient tolerated treatment well  Medical Staff Made Aware: Yes  Prognosis: Good  Evaluation/Treatment Tolerance: Patient limited by pain, Patient tolerated treatment well  Medical Staff Made Aware: Yes  Strengths: Insight into problems, Rehab experience, Support of Caregivers  Plan:  Treatment Interventions: ADL retraining, Functional transfer training, Endurance training, Equipment evaluation/education  OT Frequency: 3 times per week  OT Discharge Recommendations: Moderate intensity level of continued care  Treatment Interventions: ADL retraining, Functional transfer training, Endurance training, Equipment evaluation/education    Subjective   General:  OT Received On: 10/05/23  Reason for Referral: hyponatremia  Referred By: Alex Shrestha)  Past Medical History Relevant to Rehab: chronic hyponatremia, HTN, rectal bleed, depression, OA  Family/Caregiver Present: No  Prior to Session Communication: Bedside nurse  Patient Position Received:  (Pt. up with CTA ambulating to bathroom with FWW)  Preferred Learning Style: verbal  General Comment: Pt. recieved ambulating to bathroom with FWW and CTA upon arrival  Vital Signs:     Pain:  Pain Assessment  Pain Assessment: 0-10  Pain Score: 8  Pain Type: Chronic pain  Pain Location: Shoulder  Pain Orientation:  (B)  Pain Descriptors:  (Pt. grimacing with mobility, R shoulder and B knees, pt. did not report numeric pain rating.)  Pain Frequency: Intermittent  Clinical Progression: Gradually improving    Objective    Activities of Daily Living: LE  Dressing  LE Dressing: Yes  Pants Level of Assistance: Maximum assistance  LE Dressing Where Assessed: Toilet  LE Dressing Comments: Pt. required Max A to don undergarment and pants    Toileting  Toileting Level of Assistance: Contact guard  Where Assessed: Toilet  Toileting Comments: 3:1 commode with grab bars  Functional Standing Tolerance:     Bed Mobility/Transfers: Transfers  Transfer: Yes  Transfer 1  Technique 1: Stand to sit  Transfer Device 1: Gait belt, Walker  Transfer Level of Assistance 1: Minimum assistance  Trials/Comments 1: Pt. reached back to slowly lower self into chair with B UE assistance  Transfers 2  Technique 2: Stand to sit  Transfer Device 2: Walker  Transfer Level of Assistance 2: Moderate assistance    Toilet Transfers  Toilet Transfer to: Drop-arm commode  Toilet Transfer Technique: Ambulating  Toilet Transfers: Minimal assistance  Toilet Transfers Comments: Increased time required d/t increased pain           Outcome Measures:[facility] Daily Activity  Putting on and taking off regular lower body clothing: Total  Bathing (including washing, rinsing, drying): A lot  Putting on and taking off regular upper body clothing: A lot  Toileting, which includes using toilet, bedpan or urinal: A little  Taking care of personal grooming such as brushing teeth: A little  Eating Meals: A little  Daily Activity - Total Score: 14        Education Documentation  Body Mechanics, taught by Nettie Espinosa OT at 10/9/2023 10:41 AM.  Learner: Family, Patient  Readiness: Acceptance  Method: Explanation, Demonstration  Response: Verbalizes Understanding, Demonstrated Understanding    Precautions, taught by Nettie Espinosa OT at 10/9/2023 10:41 AM.  Learner: Family, Patient  Readiness: Acceptance  Method: Explanation, Demonstration  Response: Verbalizes Understanding, Demonstrated Understanding    ADL Training, taught by Nettie Espinosa OT at 10/9/2023 10:41 AM.  Learner: Family, Patient  Readiness: Acceptance  Method:  Explanation, Demonstration  Response: Verbalizes Understanding, Demonstrated Understanding    Education Comments  No comments found.        OP EDUCATION:  Education  Individual(s) Educated: Patient, Child (Daughter Charlette)  Education Provided: Ergonomics and postural realignment, Joint protection and energy conservation, Fall precautons, Risk and benefits of OT discussed with patient or other, POC discussed and agreed upon  Plan of Care Discussed and Agreed Upon: yes  Patient Response to Education: Patient/Caregiver Asked Appropriate Questions, Patient/Caregiver Verbalized Understanding of Information, Patient/Caregiver Performed Return Demonstration of Exercises/Activities    Goals:  Encounter Problems       Encounter Problems (Active)       Balance       STG - Maintains dynamic sitting balance with upper extremity support (Progressing)       Start:  10/05/23    Expected End:  10/20/23       Practice sitting on the edge of a bed with back support and set up to complete ADL tasks upon discharge.            Balance       Pt performs all sitting balance with IND and standing balance with min assist and appropriate assistive device (Progressing)       Start:  10/05/23    Expected End:  10/19/23               Bathing       STG - Patient will bathe body (Progressing)       Start:  10/05/23    Expected End:  10/20/23       Pt. Will complete bathing task while seated with use of shower chair with back support with Min A upon d/c            Dressing Upper Extremities       Pt. Will complete UBD with close supervision while seated supported upon discharge.      STG - Patient will dress upper body (Progressing)       Start:  10/05/23    Expected End:  10/20/23               Dressings Lower Extremities       STG - Patient will complete lower body dressing (Progressing)       Start:  10/05/23    Expected End:  10/20/23       Pt. Will complete LBD task while seated supported with use AE to increase independence with distant sup  upon discharge.                    Safety       LTG - Patient will demonstrate safety requirements appropriate to situation/environment (Progressing)       Start:  10/05/23    Expected End:  10/19/23               Transfers       STG - Transfer from bed to chair (Progressing)       Start:  10/05/23    Expected End:  10/20/23       Pt. Will complete Min A with LRAD and gait belt and FWW to safely complete bed/chair transfer upon discharge.                 STG - Patient will transfer sit to and from stand (Progressing)       Start:  10/05/23    Expected End:  10/20/23       Pt. Will safely transfer from sit/stand with close supervision and use of FWW upon d/c         STG - Patient will perform toilet transfer (Progressing)       Start:  10/05/23    Expected End:  10/20/23       Pt. Will complete safe functional toilet transfer with use of FWW and grab bar as able with close supervision upon discharge.         STG - Patient will perform tub/shower transfer (Progressing)       Start:  10/05/23    Expected End:  10/20/23       Pt. Will safely complete shower transfers with close supervision and use of shower chair and grab bar upon dc            Transfers       STG - Patient to transfer to and from sit to supine with SBA (Progressing)       Start:  10/05/23    Expected End:  10/19/23            STG - Patient will transfer sit to and from stand with min assist x 1 and appropriate assistive device (Progressing)       Start:  10/05/23    Expected End:  10/19/23

## 2023-10-20 ENCOUNTER — TELEPHONE (OUTPATIENT)
Dept: PRIMARY CARE | Facility: CLINIC | Age: 88
End: 2023-10-20
Payer: MEDICARE

## 2023-10-20 NOTE — TELEPHONE ENCOUNTER
Pt's daughter, Charlette, left a msg stating that she can see the blood work results and wants to discuss the flagged ones.  She aslo wants to know fi there is anything else of pertinence that she needs to be aware of as the pt will be most likely discharged the beginning of the week from [facility].

## 2023-10-30 ENCOUNTER — OFFICE VISIT (OUTPATIENT)
Dept: PRIMARY CARE | Facility: CLINIC | Age: 88
End: 2023-10-30
Payer: MEDICARE

## 2023-10-30 ENCOUNTER — HOME HEALTH ADMISSION (OUTPATIENT)
Dept: HOME HEALTH SERVICES | Facility: HOME HEALTH | Age: 88
End: 2023-10-30
Payer: MEDICARE

## 2023-10-30 ENCOUNTER — LAB (OUTPATIENT)
Dept: LAB | Facility: LAB | Age: 88
End: 2023-10-30
Payer: MEDICARE

## 2023-10-30 VITALS
TEMPERATURE: 97.2 F | SYSTOLIC BLOOD PRESSURE: 157 MMHG | DIASTOLIC BLOOD PRESSURE: 79 MMHG | OXYGEN SATURATION: 100 % | HEART RATE: 76 BPM

## 2023-10-30 DIAGNOSIS — D64.9 ANEMIA, UNSPECIFIED TYPE: ICD-10-CM

## 2023-10-30 DIAGNOSIS — M17.0 OSTEOARTHRITIS OF BOTH KNEES, UNSPECIFIED OSTEOARTHRITIS TYPE: ICD-10-CM

## 2023-10-30 DIAGNOSIS — M75.101 TEAR OF RIGHT ROTATOR CUFF, UNSPECIFIED TEAR EXTENT, UNSPECIFIED WHETHER TRAUMATIC: ICD-10-CM

## 2023-10-30 DIAGNOSIS — I10 HYPERTENSION, UNSPECIFIED TYPE: ICD-10-CM

## 2023-10-30 DIAGNOSIS — E87.1 HYPONATREMIA: ICD-10-CM

## 2023-10-30 DIAGNOSIS — R53.1 GENERALIZED WEAKNESS: ICD-10-CM

## 2023-10-30 DIAGNOSIS — I26.99 OTHER ACUTE PULMONARY EMBOLISM, UNSPECIFIED WHETHER ACUTE COR PULMONALE PRESENT (MULTI): Primary | ICD-10-CM

## 2023-10-30 PROBLEM — R55 SYNCOPE: Status: RESOLVED | Noted: 2023-10-30 | Resolved: 2023-10-30

## 2023-10-30 PROBLEM — D18.01 HEMANGIOMA OF SKIN AND SUBCUTANEOUS TISSUE: Status: RESOLVED | Noted: 2022-03-29 | Resolved: 2023-10-30

## 2023-10-30 PROBLEM — C44.329 SQUAMOUS CELL CARCINOMA OF SKIN OF OTHER PARTS OF FACE: Status: RESOLVED | Noted: 2022-03-29 | Resolved: 2023-10-30

## 2023-10-30 LAB
ANION GAP SERPL CALC-SCNC: 14 MMOL/L (ref 10–20)
BUN SERPL-MCNC: 12 MG/DL (ref 6–23)
CALCIUM SERPL-MCNC: 9.8 MG/DL (ref 8.6–10.3)
CHLORIDE SERPL-SCNC: 101 MMOL/L (ref 98–107)
CO2 SERPL-SCNC: 25 MMOL/L (ref 21–32)
CREAT SERPL-MCNC: 0.7 MG/DL (ref 0.5–1.05)
ERYTHROCYTE [DISTWIDTH] IN BLOOD BY AUTOMATED COUNT: 15.3 % (ref 11.5–14.5)
GFR SERPL CREATININE-BSD FRML MDRD: 82 ML/MIN/1.73M*2
GLUCOSE SERPL-MCNC: 68 MG/DL (ref 74–99)
HCT VFR BLD AUTO: 34 % (ref 36–46)
HGB BLD-MCNC: 11 G/DL (ref 12–16)
MCH RBC QN AUTO: 34.6 PG (ref 26–34)
MCHC RBC AUTO-ENTMCNC: 32.4 G/DL (ref 32–36)
MCV RBC AUTO: 107 FL (ref 80–100)
NRBC BLD-RTO: 0 /100 WBCS (ref 0–0)
PLATELET # BLD AUTO: 400 X10*3/UL (ref 150–450)
PMV BLD AUTO: 9.8 FL (ref 7.5–11.5)
POTASSIUM SERPL-SCNC: 4.5 MMOL/L (ref 3.5–5.3)
RBC # BLD AUTO: 3.18 X10*6/UL (ref 4–5.2)
SODIUM SERPL-SCNC: 135 MMOL/L (ref 136–145)
WBC # BLD AUTO: 5.9 X10*3/UL (ref 4.4–11.3)

## 2023-10-30 PROCEDURE — 1036F TOBACCO NON-USER: CPT | Performed by: NURSE PRACTITIONER

## 2023-10-30 PROCEDURE — 1159F MED LIST DOCD IN RCRD: CPT | Performed by: NURSE PRACTITIONER

## 2023-10-30 PROCEDURE — 3077F SYST BP >= 140 MM HG: CPT | Performed by: NURSE PRACTITIONER

## 2023-10-30 PROCEDURE — 80048 BASIC METABOLIC PNL TOTAL CA: CPT

## 2023-10-30 PROCEDURE — 3078F DIAST BP <80 MM HG: CPT | Performed by: NURSE PRACTITIONER

## 2023-10-30 PROCEDURE — 36415 COLL VENOUS BLD VENIPUNCTURE: CPT

## 2023-10-30 PROCEDURE — 85027 COMPLETE CBC AUTOMATED: CPT

## 2023-10-30 PROCEDURE — 1111F DSCHRG MED/CURRENT MED MERGE: CPT | Performed by: NURSE PRACTITIONER

## 2023-10-30 PROCEDURE — 1125F AMNT PAIN NOTED PAIN PRSNT: CPT | Performed by: NURSE PRACTITIONER

## 2023-10-30 PROCEDURE — 1160F RVW MEDS BY RX/DR IN RCRD: CPT | Performed by: NURSE PRACTITIONER

## 2023-10-30 PROCEDURE — 99214 OFFICE O/P EST MOD 30 MIN: CPT | Performed by: NURSE PRACTITIONER

## 2023-10-30 RX ORDER — FLUOCINOLONE ACETONIDE 0.11 MG/ML
OIL TOPICAL
COMMUNITY
Start: 2021-10-20 | End: 2023-12-21 | Stop reason: ALTCHOICE

## 2023-10-30 RX ORDER — KETOCONAZOLE 20 MG/ML
SHAMPOO, SUSPENSION TOPICAL
COMMUNITY
Start: 2021-10-20 | End: 2023-12-21 | Stop reason: ALTCHOICE

## 2023-10-30 NOTE — PROGRESS NOTES
Subjective   Cassia Humphries is a 89 y.o. female who presents for Follow-up (Follow up to hospital visits.) and Flu Vaccine (Already received on 9/8).    HPI  She presents to the office today for a hospital/rehab follow up along with her daughter.  Her daughter reports she was feeling more tired and weaker.  On August 30th she collapsed and was unresponsive- daughter was present and she called 911.   Went to the ER and was diagnosed with a DVT and PE.  (+) right heart strain  Also thought there was Atrial fibrillation during that admission.  Started on Eliquis.  Went to [facility] for rehab.  Woke up with blisters, bruising and pain in the right shoulder.  Though it was initially cellulitis and treated with antibiotics but then found to be a rotator cuff tear.  Now has some weakness in the right shoulder and arm. Still has quite a bit of pain in the shoulder.  Went back to .    She then was hallucinating and not acting right- went back to the ER and her sodium was 118.  Saw nephrology and diagnosed with SIADH.  Off all possible meds that could cause low sodium   Now on sodium tablets.  Overall seems to be better.      [facility] is doing her home PT and OT.  Needs a HHA- daughter is inquiring about other companies that can do both.  She requires home care as she does not drive and relies on the use of a wheelchair and her daughter/others to get to appointments.    She is still having a lot of pain in her right shoulder and both knees and hips. Walking and tranfers are a challenge. Her daughter is currently staying with her while they work on strengthening but the goal is to get her back to where she is able to get around her home by herself.   She has a Life Alert.  No chest pain, SOB or palpitations.   Edema is much better.  (+) abdominal pain (chronic) not new.  No nausea or vomiting.   No fever or chills.     Review of Systems   Constitutional:  Positive for fatigue. Negative for chills and fever.    Respiratory:  Negative for cough, chest tightness, shortness of breath and wheezing.    Cardiovascular:  Negative for chest pain and palpitations.   Gastrointestinal:  Positive for abdominal pain and constipation. Negative for blood in stool, diarrhea, nausea and vomiting.   Neurological:  Positive for weakness. Negative for dizziness, numbness and headaches.     Objective   /79   Pulse 76   Temp 36.2 °C (97.2 °F)   SpO2 100%     Physical Exam  Constitutional:       General: She is not in acute distress.     Appearance: Normal appearance. She is not toxic-appearing.   Neck:      Thyroid: No thyroid mass or thyromegaly.   Cardiovascular:      Rate and Rhythm: Normal rate and regular rhythm.      Pulses: Normal pulses.      Heart sounds: Normal heart sounds, S1 normal and S2 normal. No murmur heard.  Pulmonary:      Effort: Pulmonary effort is normal.      Breath sounds: Normal breath sounds and air entry.   Abdominal:      General: Bowel sounds are normal.      Palpations: Abdomen is soft.      Tenderness: There is abdominal tenderness in the right upper quadrant. There is no guarding or rebound.   Musculoskeletal:      Right shoulder: Tenderness and bony tenderness present. Decreased range of motion. Decreased strength.      Left shoulder: Normal.      Right lower leg: No edema.      Left lower leg: No edema.   Lymphadenopathy:      Cervical: No cervical adenopathy.   Neurological:      Mental Status: She is alert and oriented to person, place, and time.      Comments: Presents to the office today in a wheelchair.   Psychiatric:         Mood and Affect: Mood normal.         Behavior: Behavior normal.         Thought Content: Thought content normal.         Judgment: Judgment normal.     Assessment/Plan   Problem List Items Addressed This Visit       Anemia     Recheck CBC.         Relevant Orders    CBC (Completed)    Hyponatremia - Primary     Better- check  BMP today.         Relevant Orders    Basic  Metabolic Panel (Completed)    Osteoarthritis of knee     She would benefit from PT- ordered today.         Relevant Orders    Referral to Home Care    HTN (hypertension)     Slightly elevated today- will continue to monitor.         Pulmonary embolism (CMS/HCC)     Clinically stable on Eliquis.         Relevant Orders    Referral to Home Care    Tear of right rotator cuff     Referral to orthopedics to see if they have any recommendations for pain relief. She will be working with OT.         Relevant Orders    Referral to Home Care    Referral to Orthopaedic Surgery    Generalized weakness    Relevant Orders    Referral to Home Care       It has been a pleasure seeing you today!

## 2023-10-31 PROBLEM — R53.1 GENERALIZED WEAKNESS: Status: ACTIVE | Noted: 2023-10-31

## 2023-10-31 PROBLEM — L03.116 CELLULITIS OF LEFT LOWER LEG: Status: RESOLVED | Noted: 2023-06-05 | Resolved: 2023-10-31

## 2023-10-31 PROBLEM — M75.101 TEAR OF RIGHT ROTATOR CUFF: Status: ACTIVE | Noted: 2023-10-31

## 2023-10-31 ASSESSMENT — ENCOUNTER SYMPTOMS
NUMBNESS: 0
WHEEZING: 0
NAUSEA: 0
HEADACHES: 0
CONSTIPATION: 1
SHORTNESS OF BREATH: 0
VOMITING: 0
ABDOMINAL PAIN: 1
BLOOD IN STOOL: 0
FATIGUE: 1
FEVER: 0
DIARRHEA: 0
CHEST TIGHTNESS: 0
PALPITATIONS: 0
CHILLS: 0
COUGH: 0
DIZZINESS: 0
WEAKNESS: 1

## 2023-11-01 ENCOUNTER — HOME CARE VISIT (OUTPATIENT)
Dept: HOME HEALTH SERVICES | Facility: HOME HEALTH | Age: 88
End: 2023-11-01

## 2023-11-01 NOTE — TELEPHONE ENCOUNTER
----- Message from HELEN Noriega-CNP sent at 10/31/2023 11:14 PM EDT -----  Please let her daughter know her labs were stable. Sodium level was 135. Her glucose was a little low at 68 after our visit.  Anemia is improved- hemoglobin is now up to 11.0.

## 2023-11-01 NOTE — TELEPHONE ENCOUNTER
Talked with pt Daughter (per HIPAA, ok to do so) Pt daughter verbalized understanding of the results. She wants to know, given her results can she do the sodium chloride pill every other day? And when will we do another blood work to determine that? How about fluid restrictions? She is also having a lot of pain and hasn't got out of bed. She is not sure what to do next. Please advise

## 2023-11-01 NOTE — ASSESSMENT & PLAN NOTE
Referral to orthopedics to see if they have any recommendations for pain relief. She will be working with OT.

## 2023-11-01 NOTE — TELEPHONE ENCOUNTER
Pt daughter verbalized understanding. She said she is taking tylenol 650mg 3x a day. She also said they tried gabapentin in the past and it did not help her. She said PT was there at the time of the call to help her move around. She is still having abdominal pain because of her bowel her daughter said.

## 2023-11-02 ENCOUNTER — TELEPHONE (OUTPATIENT)
Dept: PRIMARY CARE | Facility: CLINIC | Age: 88
End: 2023-11-02
Payer: MEDICARE

## 2023-11-02 NOTE — TELEPHONE ENCOUNTER
"Carmen, with [facility], left a msg stating that she completed the OT eval today,  She is looking for a verbal \"ok\"for 1/wk for 6 weeks of therapy.  She would like a name with the verbal ok.   "

## 2023-11-03 ENCOUNTER — TELEPHONE (OUTPATIENT)
Dept: PRIMARY CARE | Facility: CLINIC | Age: 88
End: 2023-11-03

## 2023-11-03 ENCOUNTER — HOSPITAL ENCOUNTER (OUTPATIENT)
Dept: RADIOLOGY | Facility: HOSPITAL | Age: 88
Discharge: HOME | End: 2023-11-03
Payer: MEDICARE

## 2023-11-03 ENCOUNTER — CLINICAL SUPPORT (OUTPATIENT)
Dept: SPORTS MEDICINE | Facility: HOSPITAL | Age: 88
End: 2023-11-03
Payer: MEDICARE

## 2023-11-03 VITALS — WEIGHT: 123 LBS | HEIGHT: 61 IN | BODY MASS INDEX: 23.22 KG/M2

## 2023-11-03 DIAGNOSIS — M75.101 NONTRAUMATIC TEAR OF RIGHT ROTATOR CUFF, UNSPECIFIED TEAR EXTENT: ICD-10-CM

## 2023-11-03 DIAGNOSIS — M25.511 RIGHT SHOULDER PAIN, UNSPECIFIED CHRONICITY: Primary | ICD-10-CM

## 2023-11-03 DIAGNOSIS — M25.511 RIGHT SHOULDER PAIN, UNSPECIFIED CHRONICITY: ICD-10-CM

## 2023-11-03 PROCEDURE — 73030 X-RAY EXAM OF SHOULDER: CPT | Mod: RIGHT SIDE | Performed by: RADIOLOGY

## 2023-11-03 PROCEDURE — 99213 OFFICE O/P EST LOW 20 MIN: CPT

## 2023-11-03 PROCEDURE — 99203 OFFICE O/P NEW LOW 30 MIN: CPT

## 2023-11-03 PROCEDURE — 73030 X-RAY EXAM OF SHOULDER: CPT | Mod: RT,FY

## 2023-11-03 ASSESSMENT — PAIN SCALES - GENERAL: PAINLEVEL_OUTOF10: 10 - WORST POSSIBLE PAIN

## 2023-11-03 ASSESSMENT — PAIN DESCRIPTION - DESCRIPTORS: DESCRIPTORS: ACHING;RADIATING;SHARP;STABBING

## 2023-11-03 ASSESSMENT — PAIN - FUNCTIONAL ASSESSMENT: PAIN_FUNCTIONAL_ASSESSMENT: 0-10

## 2023-11-03 NOTE — TELEPHONE ENCOUNTER
RAMÍREZ:  PT's daughter called this morning stating that she was having a difficult time getting appointment for her mother with  Ortho.    So she scheduled an appointment with Dr. Ellington at [facility], later she was called and told the appointment was not available.  She then called me back and told me her mother rolled over in bed wrong this morning and may have dislocated her shoulder and was in excruciating pain.  I asked Berta Smith if she could assist with getting the patient scheduled with .  She to suggested to daughter taking patient to  same-day-ortho clinic at [facility], which she did.  PT also has an appointment with Dr. Baxter in [place] on Monday.

## 2023-11-06 ENCOUNTER — APPOINTMENT (OUTPATIENT)
Dept: HOME HEALTH SERVICES | Facility: HOME HEALTH | Age: 88
End: 2023-11-06
Payer: MEDICARE

## 2023-11-06 NOTE — TELEPHONE ENCOUNTER
[facility] is not a home Care, but the OT therapists last name of what I understand.  She, Carmen, is with [facility] care.  She did an eval, and wants to see the pt 1/week for 6 weeks.  She is looking for a verbal OK.  I spoke wit Charlette, pt's daughter, and she said that they are going to go with [facility], NOT  home care, as they don't have an aide for the area the pt lives in, and the other company has started the services.  Her ph:  [phone]

## 2023-11-07 NOTE — TELEPHONE ENCOUNTER
"LM on Carmen voicemail with giving \"ok\" permission for OT per Lizbeth. She should call if she has any questions.  "

## 2023-11-07 NOTE — TELEPHONE ENCOUNTER
Lindsay, I spoke with Charlette, pt's daughter, and she said that they will be using [facility] home Care.  Please notifiy Carmen with the verbal ok to OT.

## 2023-11-07 NOTE — PROGRESS NOTES
HPI:  Patient presents today with a roughly 6-week history of right shoulder pain.  She present presented to the ED on the 24th September for a potential rotator cuff injury.  She is that she has a previous history of rotator cuff injury and has done physical therapy and occupational therapy which has not helped.  She states the pain has been manageable for the past few weeks but last night she was in excruciating pain.  She states her entire right shoulder is sharp.  No positional changes improves the pain.  She states she has pain down to her elbow.  She and her family concerned about what she can do for the pain at this point.  No other questions or concerns at time of visit.    ROS:  Reviewed on EMR and patient intake sheet.    PMH/SH:  Reviewed on EMR and patient intake sheet.    Exam:  MSK: Patient visually uncomfortable on visual exam.  I am unable to properly examine the right shoulder as any range of motion or palpation of the shoulder elicits moderate to severe pain.  General: Mild acute distress. Awake and conversant.  Eyes: Normal conjunctiva, anicteric. Round symmetric pupils.  ENT: Hearing grossly intact. No nasal discharge.  Neck: Neck is supple. No masses or thyromegaly.  Respiratory: Respirations are non-labored. No wheezing.  Skin: Warm. No rashes or ulcers.  Psych: Alert and oriented. Cooperative, appropriate mood and affect, normal judgement.  CV: No lower extremity edema.  Neuro: Sensation and CN II-XII grossly normal.    Radiology:     X-rays from 11/3/2023 referred to and report likely longstanding massive rotator cuff tear with extensive scalloping of acromion and clavicle.  Advanced degenerative changes of glenohumeral joint.    Diagnosis:    Rotator cuff tear, right  Severe osteoarthritis, right shoulder    Assessment and Plan:  Patient was seen today and evaluated for right shoulder pain that was exacerbated last night.  We discussed conservative management of rest, ice, possible sling,  and over-the-counter pain medication such as Tylenol and ibuprofen.  I discussed with the patient and her family that at this point, it is most important to make the patient comfortable as I do not believe many interventions are appropriate for her state and age.  We discussed potential corticosteroid injections in her shoulder which may or may not provide relief.  They may wear a sling as tolerated at that alleviates her pain.  I believe therapy is contraindicated at this point due to status of shoulder joint and pain.  Patient and family feels all questions were appropriately answered at time of visit today.  Patient agrees to the plan above.    This note was dictated using speech recognition software and was not corrected for spelling or grammatical errors    Jarrett Hernandes PA-C  Department of Orthopaedic Surgery    [address] [place] [address]    Voicemail: [phone]   Appts: [phone]  Fax: [phone]

## 2023-11-08 ENCOUNTER — OFFICE VISIT (OUTPATIENT)
Dept: ORTHOPEDIC SURGERY | Facility: CLINIC | Age: 88
End: 2023-11-08
Payer: MEDICARE

## 2023-11-08 DIAGNOSIS — M75.101 TEAR OF RIGHT ROTATOR CUFF, UNSPECIFIED TEAR EXTENT, UNSPECIFIED WHETHER TRAUMATIC: ICD-10-CM

## 2023-11-08 DIAGNOSIS — M19.011 ARTHRITIS OF RIGHT GLENOHUMERAL JOINT: Primary | ICD-10-CM

## 2023-11-08 PROCEDURE — 1111F DSCHRG MED/CURRENT MED MERGE: CPT | Performed by: PHYSICIAN ASSISTANT

## 2023-11-08 PROCEDURE — 20610 DRAIN/INJ JOINT/BURSA W/O US: CPT | Performed by: PHYSICIAN ASSISTANT

## 2023-11-08 PROCEDURE — 3077F SYST BP >= 140 MM HG: CPT | Performed by: PHYSICIAN ASSISTANT

## 2023-11-08 PROCEDURE — 1160F RVW MEDS BY RX/DR IN RCRD: CPT | Performed by: PHYSICIAN ASSISTANT

## 2023-11-08 PROCEDURE — 1036F TOBACCO NON-USER: CPT | Performed by: PHYSICIAN ASSISTANT

## 2023-11-08 PROCEDURE — 1125F AMNT PAIN NOTED PAIN PRSNT: CPT | Performed by: PHYSICIAN ASSISTANT

## 2023-11-08 PROCEDURE — 3078F DIAST BP <80 MM HG: CPT | Performed by: PHYSICIAN ASSISTANT

## 2023-11-08 PROCEDURE — 99204 OFFICE O/P NEW MOD 45 MIN: CPT | Performed by: PHYSICIAN ASSISTANT

## 2023-11-08 PROCEDURE — 1159F MED LIST DOCD IN RCRD: CPT | Performed by: PHYSICIAN ASSISTANT

## 2023-11-08 RX ORDER — TRIAMCINOLONE ACETONIDE 40 MG/ML
10 INJECTION, SUSPENSION INTRA-ARTICULAR; INTRAMUSCULAR
Status: COMPLETED | OUTPATIENT
Start: 2023-11-08 | End: 2023-11-08

## 2023-11-08 RX ADMIN — TRIAMCINOLONE ACETONIDE 10 MG: 40 INJECTION, SUSPENSION INTRA-ARTICULAR; INTRAMUSCULAR at 12:28

## 2023-11-08 NOTE — PROGRESS NOTES
History of Present Illness  89-year-old female presenting with right shoulder pain following an injury about 6 weeks ago.  Patient was at a skilled nursing facility and experienced an incident with the right shoulder, presented to the emergency department where x-rays were taken showing no acute injury.  Since then patient has continued to experience significant pain along with decreased mobility in the right shoulder.  She does have a history of a remote rotator cuff injury 20 years ago which she opted to treat conservatively, she has dealt with intermittent shoulder pain and inability to raise the arm overhead since then.  She was seen by sports medicine and discussed possible conservative treatment options.  She is here to consider other options today.    Past Medical History:   Diagnosis Date    Anterior displaced type ii dens fracture, sequela 06/25/2019    Odontoid fracture, sequela    Encounter for general adult medical examination without abnormal findings 07/19/2019    Medicare annual wellness visit, initial    Hemangioma of skin and subcutaneous tissue 03/29/2022    Localized edema 05/28/2020    Bilateral edema of lower extremity    Multiple fractures of pelvis with stable disruption of pelvic ring, initial encounter for closed fracture (CMS/[facility]) 04/01/2019    Closed pelvic ring fracture, initial encounter    Pain in left shoulder 01/08/2019    Left shoulder pain    Personal history of other diseases of the musculoskeletal system and connective tissue 07/19/2019    History of low back pain    Personal history of other diseases of the musculoskeletal system and connective tissue 06/24/2019    History of neck pain    Primary osteoarthritis, right shoulder 07/19/2019    DJD of right shoulder    Segmental and somatic dysfunction of abdomen and other regions 11/03/2017    Somatic dysfunction of back    Squamous cell carcinoma of skin of other parts of face 03/29/2022    Stiffness of left shoulder, not elsewhere  classified 01/08/2019    Stiffness of left shoulder, not elsewhere classified    Syncope 10/30/2023    SYNCOPE    Unilateral primary osteoarthritis, right knee 08/14/2017    Right knee DJD    Unspecified displaced fracture of second cervical vertebra, initial encounter for closed fracture (CMS/[facility]) 03/01/2019    Closed odontoid fracture, initial encounter    Unspecified displaced fracture of second cervical vertebra, subsequent encounter for fracture with routine healing 07/19/2019    Closed odontoid fracture with routine healing, subsequent encounter    Unspecified symptoms and signs involving the genitourinary system     UTI symptoms    Urinary tract infection, site not specified     Frequent UTI       Medication Documentation Review Audit       Reviewed by Dulce Maria Ronquillo MA (Medical Assistant) on 11/08/23 at 1153      Medication Order Taking? Sig Documenting Provider Last Dose Status   acetaminophen (Tylenol 8 HOUR) 650 mg ER tablet [id number] No Take 2 tablets (1,300 mg) by mouth 2 times a day. Do not crush, chew, or split. Gina Tee MD Taking Active   apixaban (Eliquis) 5 mg tablet [id number] No Take 1 tablet (5 mg) by mouth 2 times a day. Gina Tee MD Taking Active   ascorbic acid, vitamin C, 500 mg capsule [id number] No Take 1 capsule by mouth once daily. Gina Tee MD Taking Active   bisacodyl (Dulcolax) 10 mg suppository [id number] No Insert 1 suppository (10 mg) into the rectum 1 time if needed for constipation. Gina Tee MD Taking Active   cholecalciferol (Vitamin D-3) 25 MCG (1000 UT) capsule [id number] No Take 1 capsule (25 mcg) by mouth once daily. Gina Tee MD Taking Active   cyanocobalamin (Vitamin B-12) 500 mcg tablet [id number] No Take 1 tablet (500 mcg) by mouth once daily. Gina Tee MD Taking Active   dicyclomine (Bentyl) 10 mg capsule [id number] No Take 1 capsule (10 mg) by mouth every 6 hours if needed. Gina Tee MD Taking Active    fluocinolone and shower cap 0.01 % oil [id number] No 1 Application Historical Provider, MD Taking Active   furosemide (Lasix) 20 mg tablet [id number] No Take 1 tablet (20 mg) by mouth once daily as needed (leg swelling). Alex Shrestha MD Taking Active   ketoconazole (NIZOral) 2 % shampoo [id number] No 1 Application Historical Provider, MD Taking Active   sennosides (Senokot) 8.6 mg tablet [id number] No Take 2 tablets (17.2 mg) by mouth 2 times a day.   Patient taking differently: Take 2 tablets (17.2 mg) by mouth if needed.    Alex Shrestha MD Taking Differently Active   sodium chloride 1,000 mg tablet [id number] No Take 1 tablet (1 g) by mouth once daily. Alex Shrestha MD Taking Active                    No Known Allergies    Social History     Socioeconomic History    Marital status:      Spouse name: Not on file    Number of children: Not on file    Years of education: Not on file    Highest education level: Not on file   Occupational History    Not on file   Tobacco Use    Smoking status: Former     Packs/day: 1.00     Years: 10.00     Additional pack years: 0.00     Total pack years: 10.00     Types: Cigarettes     Quit date:      Years since quittin.8    Smokeless tobacco: Never   Vaping Use    Vaping Use: Never used   Substance and Sexual Activity    Alcohol use: Not on file    Drug use: Not on file    Sexual activity: Not on file   Other Topics Concern    Not on file   Social History Narrative    Not on file     Social Determinants of Health     Financial Resource Strain: Unknown (10/4/2023)    Overall Financial Resource Strain (CARDIA)     Difficulty of Paying Living Expenses: Patient refused   Food Insecurity: Not on file   Transportation Needs: No Transportation Needs (10/4/2023)    PRAPARE - Transportation     Lack of Transportation (Medical): No     Lack of Transportation (Non-Medical): No   Physical Activity: Not on file   Stress: Not on file   Social Connections: Not on file    Intimate Partner Violence: Not on file   Housing Stability: Unknown (10/4/2023)    Housing Stability Vital Sign     Unable to Pay for Housing in the Last Year: Patient refused     Number of Places Lived in the Last Year: 1     Unstable Housing in the Last Year: Patient refused       Past Surgical History:   Procedure Laterality Date    CT NECK ANGIO W AND WO IV CONTRAST  1/24/2019    CT NECK ANGIO W AND WO IV CONTRAST 1/24/2019 [facility] CLINICAL LEGACY    MR HEAD ANGIO WO IV CONTRAST  8/13/2016    MR HEAD ANGIO WO IV CONTRAST 8/13/2016 [facility]    MR NECK ANGIO WO IV CONTRAST  8/13/2016    MR NECK ANGIO WO IV CONTRAST 8/13/2016 [facility] EMERGENCY LEGACY    OTHER SURGICAL HISTORY  07/01/2019    No history of surgery         Review of Systems    GENERAL: Negative  GI: Negative  MUSCULOSKELETAL: See HPI  SKIN: Negative  NEURO:  Negative     Physical Exam  Constitutional  General appearance:  Alert, oriented, and in no acute distress.  Well developed, well nourished.  Head and Face  Head and face:  Normocephalic and atraumatic.  Ears, Nose, Mouth, and Throat  External inspection of ears and nose: Normal.  Eyes:  Pupils are equal and round.  Neck  Neck:  no neck mass was observed.  Pulmonary  Respiratory effort:  no respiratory distress.  Cardiovascular  Intact distal pulses.  Lymphatic  Palpation of lymph nodes in the affected extremity:  Normal.  Skin  Skin and subcutaneous tissue:  Normal skin color and pigmentation.  Normal skin turgor.  No rashes.  Neurologic  Reflexes:  deep tendon reflexes were 2+ and symmetric.  Sensation:  normal to light touch.  Psychiatric  Judgement and insight:  Intact.  Mood and affect:  Normal.  Musculoskeletal  Right shoulder with pseudoparalysis with attempted active motion.  Right shoulder passive range of motion forward flexion abduction to 45 degrees.  Externally rotates 10 degrees.  Internally rotates to the greater trochanter.  Range of motion limited due to pain.  Unable to do  any special testing to the right shoulder due to pain.  Sensation intact light touch.  Right upper extremity is neurovascularly intact.       Imaging     X-rays of the right shoulder from 11/3/2023 were obtained and personally reviewed by myself today showing incredibly severe glenohumeral joint arthritis with evidence of chronic rotator cuff tearing with scalloping of the undersurface of the acromion and clavicle.    Patient ID: Cassia Humphries is a 89 y.o. female.    L Inj/Asp: R glenohumeral on 11/8/2023 12:28 PM  Indications: pain  Details: 22 G needle, posterior approach  Medications: 10 mg triamcinolone acetonide 40 mg/mL  Outcome: tolerated well, no immediate complications  Procedure, treatment alternatives, risks and benefits explained, specific risks discussed. Consent was given by the patient. Immediately prior to procedure a time out was called to verify the correct patient, procedure, equipment, support staff and site/side marked as required. Patient was prepped and draped in the usual sterile fashion.           Assessment  Severe glenohumeral joint arthritis and chronic rotator cuff tearing in the right shoulder    Plan  Had a long discussion with the patient and her daughter regarding her right shoulder.  We discussed the fact that she has very severe glenohumeral joint arthritis in the shoulder along with chronic rotator cuff tearing, I discussed with them that this was all ongoing prior to the incident that occurred 6 weeks ago due to the severity which is shown on x-ray.  Discussed with them that incident was a likely an exacerbating factor which has worsened the pain which she is currently experiencing.  Due to the patient's age and comorbidity surgical intervention is not a reasonable option.  I offer the patient a steroid injection into the right glenohumeral joint which she wanted proceed with and tolerated well today.  She is currently working with physical therapy and Occupational Therapy  which she can continue with if she finds that that is beneficial.  Encouraged Tylenol and Voltaren gel as needed.  We can see the patient back as needed.  The patient should call the office during business hours (9am-3pm; Monday - Friday)  with any questions or problems.  If the patient has any urgent issues outside of business hours, they should go to a local Emergency Room.

## 2023-11-13 ENCOUNTER — APPOINTMENT (OUTPATIENT)
Dept: ORTHOPEDIC SURGERY | Facility: CLINIC | Age: 88
End: 2023-11-13
Payer: MEDICARE

## 2023-11-17 ENCOUNTER — TELEPHONE (OUTPATIENT)
Dept: PRIMARY CARE | Facility: CLINIC | Age: 88
End: 2023-11-17
Payer: MEDICARE

## 2023-11-17 NOTE — TELEPHONE ENCOUNTER
DTR calling, states her mom's LT shoulder is now in similar state as her RT shoulder. She has an appt with Ortho on 11/20 to get cortisone shot. Her pain is hindering her mobility with getting up and down.  She thinks her mom would benefit more from PT than OT. Hoping insurance would allow for more PT than OT with post-skilled nursing.    She is also asking, her mom is having 3 tooth extractions on 11/27. She needs to be off blood thinner, is this safe to hold this back? And as far as the blood thinners, they normally provide ibuprofen and tylenol - questioning whether she should take ibuprofen for a short time , would this be safe.  Please advise.  Please call Charlette back at [phone]

## 2023-11-20 ENCOUNTER — ANCILLARY PROCEDURE (OUTPATIENT)
Dept: RADIOLOGY | Facility: CLINIC | Age: 88
End: 2023-11-20
Payer: MEDICARE

## 2023-11-20 ENCOUNTER — TELEPHONE (OUTPATIENT)
Dept: PRIMARY CARE | Facility: CLINIC | Age: 88
End: 2023-11-20

## 2023-11-20 ENCOUNTER — OFFICE VISIT (OUTPATIENT)
Dept: ORTHOPEDIC SURGERY | Facility: CLINIC | Age: 88
End: 2023-11-20
Payer: MEDICARE

## 2023-11-20 DIAGNOSIS — M25.512 LEFT SHOULDER PAIN, UNSPECIFIED CHRONICITY: ICD-10-CM

## 2023-11-20 DIAGNOSIS — M19.90 ARTHRITIS: ICD-10-CM

## 2023-11-20 DIAGNOSIS — M19.012 ARTHRITIS OF LEFT GLENOHUMERAL JOINT: Primary | ICD-10-CM

## 2023-11-20 PROCEDURE — 1125F AMNT PAIN NOTED PAIN PRSNT: CPT | Performed by: PHYSICIAN ASSISTANT

## 2023-11-20 PROCEDURE — 73030 X-RAY EXAM OF SHOULDER: CPT | Mod: LT

## 2023-11-20 PROCEDURE — 99213 OFFICE O/P EST LOW 20 MIN: CPT | Performed by: PHYSICIAN ASSISTANT

## 2023-11-20 PROCEDURE — 1036F TOBACCO NON-USER: CPT | Performed by: PHYSICIAN ASSISTANT

## 2023-11-20 PROCEDURE — 20610 DRAIN/INJ JOINT/BURSA W/O US: CPT | Performed by: PHYSICIAN ASSISTANT

## 2023-11-20 PROCEDURE — 1160F RVW MEDS BY RX/DR IN RCRD: CPT | Performed by: PHYSICIAN ASSISTANT

## 2023-11-20 PROCEDURE — 1159F MED LIST DOCD IN RCRD: CPT | Performed by: PHYSICIAN ASSISTANT

## 2023-11-20 PROCEDURE — 73030 X-RAY EXAM OF SHOULDER: CPT | Mod: LEFT SIDE | Performed by: RADIOLOGY

## 2023-11-20 RX ORDER — TRIAMCINOLONE ACETONIDE 40 MG/ML
10 INJECTION, SUSPENSION INTRA-ARTICULAR; INTRAMUSCULAR
Status: COMPLETED | OUTPATIENT
Start: 2023-11-20 | End: 2023-11-20

## 2023-11-20 RX ADMIN — TRIAMCINOLONE ACETONIDE 10 MG: 40 INJECTION, SUSPENSION INTRA-ARTICULAR; INTRAMUSCULAR at 10:59

## 2023-11-20 NOTE — TELEPHONE ENCOUNTER
PT's DTR called and left very lengthy message regarding her mother needing her teeth pulled.  She has 3 broken teeth that are sharp that need to be extracted.  She is not getting any nutrition since she is unable to chew.  Then they need to find time to have 2 more teeth moved.  They would like to get done before end or year for insurance purposes and to allow enough time for healing so she can get dentures at the beginning of the year.  Her mouth health is very critical.  On her last scan they did not see any clotting.  She does not feel a couple days off the Eliquis would be an issue.    She is going to continue with PT, OT is coming tomorrow and they will decide if PT or OT is needed.      She did get another shot in her other shoulder.  Both rotator cuffs are torn.    She will be available after 4:30 today if you could call around then, she will keep her eye on the phone.    I did save the message as it was quite lengthy should you need to listen to it.    Ear Wedge Repair Text: A wedge excision was completed by carrying down an excision through the full thickness of the ear and cartilage with an inward facing Burow's triangle. The wound was then closed in a layered fashion.

## 2023-11-20 NOTE — TELEPHONE ENCOUNTER
LM on patient's daughter (MICHAELA) voicemail. She should call back with information regarding voicemail I left.

## 2023-11-20 NOTE — PROGRESS NOTES
History of Present Illness:    89-year-old female presenting for new left shoulder pain for about the last week.  She does not recall any specific trauma or injury, but does note she is relying on the left shoulder a lot more since the right shoulder has been sore over the past month.  Describes the pain as generally sore and achy with sharp pains.  States the pain has gotten worse over the last week.  She has been treating the shoulder with rest with no significant improvement.  Attempting to use or move the left shoulder worsens her pain.    Past Medical History:   Diagnosis Date    Anterior displaced type ii dens fracture, sequela 06/25/2019    Odontoid fracture, sequela    Encounter for general adult medical examination without abnormal findings 07/19/2019    Medicare annual wellness visit, initial    Hemangioma of skin and subcutaneous tissue 03/29/2022    Localized edema 05/28/2020    Bilateral edema of lower extremity    Multiple fractures of pelvis with stable disruption of pelvic ring, initial encounter for closed fracture (CMS/[facility]) 04/01/2019    Closed pelvic ring fracture, initial encounter    Pain in left shoulder 01/08/2019    Left shoulder pain    Personal history of other diseases of the musculoskeletal system and connective tissue 07/19/2019    History of low back pain    Personal history of other diseases of the musculoskeletal system and connective tissue 06/24/2019    History of neck pain    Primary osteoarthritis, right shoulder 07/19/2019    DJD of right shoulder    Segmental and somatic dysfunction of abdomen and other regions 11/03/2017    Somatic dysfunction of back    Squamous cell carcinoma of skin of other parts of face 03/29/2022    Stiffness of left shoulder, not elsewhere classified 01/08/2019    Stiffness of left shoulder, not elsewhere classified    Syncope 10/30/2023    SYNCOPE    Unilateral primary osteoarthritis, right knee 08/14/2017    Right knee DJD    Unspecified displaced  fracture of second cervical vertebra, initial encounter for closed fracture (CMS/[facility]) 03/01/2019    Closed odontoid fracture, initial encounter    Unspecified displaced fracture of second cervical vertebra, subsequent encounter for fracture with routine healing 07/19/2019    Closed odontoid fracture with routine healing, subsequent encounter    Unspecified symptoms and signs involving the genitourinary system     UTI symptoms    Urinary tract infection, site not specified     Frequent UTI       Medication Documentation Review Audit       Reviewed by Dulce Maria Ronquillo MA (Medical Assistant) on 11/08/23 at 1153      Medication Order Taking? Sig Documenting Provider Last Dose Status   acetaminophen (Tylenol 8 HOUR) 650 mg ER tablet [id number] No Take 2 tablets (1,300 mg) by mouth 2 times a day. Do not crush, chew, or split. Historical Provider, MD Taking Active   apixaban (Eliquis) 5 mg tablet [id number] No Take 1 tablet (5 mg) by mouth 2 times a day. Historical Provider, MD Taking Active   ascorbic acid, vitamin C, 500 mg capsule [id number] No Take 1 capsule by mouth once daily. Historical Provider, MD Taking Active   bisacodyl (Dulcolax) 10 mg suppository [id number] No Insert 1 suppository (10 mg) into the rectum 1 time if needed for constipation. Historical Provider, MD Taking Active   cholecalciferol (Vitamin D-3) 25 MCG (1000 UT) capsule [id number] No Take 1 capsule (25 mcg) by mouth once daily. Historical Provider, MD Taking Active   cyanocobalamin (Vitamin B-12) 500 mcg tablet [id number] No Take 1 tablet (500 mcg) by mouth once daily. Historical Provider, MD Taking Active   dicyclomine (Bentyl) 10 mg capsule [id number] No Take 1 capsule (10 mg) by mouth every 6 hours if needed. Historical Provider, MD Taking Active   fluocinolone and shower cap 0.01 % oil [id number] No 1 Application Historical Provider, MD Taking Active   furosemide (Lasix) 20 mg tablet [id number] No Take 1 tablet (20 mg) by mouth once daily as needed (leg  swelling). Alex Shrestha MD Taking Active   ketoconazole (NIZOral) 2 % shampoo [id number] No 1 Application Historical Provider, MD Taking Active   sennosides (Senokot) 8.6 mg tablet [id number] No Take 2 tablets (17.2 mg) by mouth 2 times a day.   Patient taking differently: Take 2 tablets (17.2 mg) by mouth if needed.    Alex Shrestha MD Taking Differently Active   sodium chloride 1,000 mg tablet [id number] No Take 1 tablet (1 g) by mouth once daily. Alex Shrestha MD Taking Active                    No Known Allergies    Social History     Socioeconomic History    Marital status:      Spouse name: Not on file    Number of children: Not on file    Years of education: Not on file    Highest education level: Not on file   Occupational History    Not on file   Tobacco Use    Smoking status: Former     Packs/day: 1.00     Years: 10.00     Additional pack years: 0.00     Total pack years: 10.00     Types: Cigarettes     Quit date:      Years since quittin.9    Smokeless tobacco: Never   Vaping Use    Vaping Use: Never used   Substance and Sexual Activity    Alcohol use: Not on file    Drug use: Not on file    Sexual activity: Not on file   Other Topics Concern    Not on file   Social History Narrative    Not on file     Social Determinants of Health     Financial Resource Strain: Unknown (10/4/2023)    Overall Financial Resource Strain (CARDIA)     Difficulty of Paying Living Expenses: Patient refused   Food Insecurity: Not on file   Transportation Needs: No Transportation Needs (10/4/2023)    PRAPARE - Transportation     Lack of Transportation (Medical): No     Lack of Transportation (Non-Medical): No   Physical Activity: Not on file   Stress: Not on file   Social Connections: Not on file   Intimate Partner Violence: Not on file   Housing Stability: Unknown (10/4/2023)    Housing Stability Vital Sign     Unable to Pay for Housing in the Last Year: Patient refused     Number of Places Lived in the  Last Year: 1     Unstable Housing in the Last Year: Patient refused       Past Surgical History:   Procedure Laterality Date    CT ANGIO NECK W  1/24/2019    CT NECK ANGIO W AND WO IV CONTRAST 1/24/2019 [facility] CLINICAL LEGACY    MR HEAD ANGIO WO IV CONTRAST  8/13/2016    MR HEAD ANGIO WO IV CONTRAST 8/13/2016 [facility] EMERGENCY LEGACY    MR NECK ANGIO WO IV CONTRAST  8/13/2016    MR NECK ANGIO WO IV CONTRAST 8/13/2016 [facility] EMERGENCY LEGACY    OTHER SURGICAL HISTORY  07/01/2019    No history of surgery         Review of Systems:    GENERAL: Negative  GI: Negative  MUSCULOSKELETAL: See HPI  SKIN: Negative  NEURO:  Negative     Physical Exam:    Shoulder:    Left shoulder with mild ecchymosis.  Left shoulder range of motion forward flexion abduction to 80 degrees.  Externally rotates 20 degrees.  Range of motion limited due to pain.  Unable to do special testing due to pain.  Elbow and wrist motion were not irritable.  Radial pulse 2+ and palpable. SILT. UE is NVI.     Imaging     Xrays were ordered and obtained by myself, Nahed Wu PAC. I personally reviewed the images today and the following is my personal findings: Severe glenohumeral joint arthritis with evidence of chronic rotator cuff tearing with proximal migration of the humeral head with wear of the distal clavicle and acromion.    Patient ID: Cassia Humphries is a 89 y.o. female.    L Inj/Asp: L glenohumeral on 11/20/2023 10:59 AM  Indications: pain  Details: 22 G needle, posterior approach  Medications: 10 mg triamcinolone acetonide 40 mg/mL  Outcome: tolerated well, no immediate complications  Procedure, treatment alternatives, risks and benefits explained, specific risks discussed. Consent was given by the patient. Immediately prior to procedure a time out was called to verify the correct patient, procedure, equipment, support staff and site/side marked as required. Patient was prepped and draped in the usual sterile fashion.           Assessment   We had a  long discussion in regards to the patient's shoulder pain.  The differential diagnosis of the patient's shoulder pain include: shoulder impingement, rotator cuff tendinopathy, rotator cuff tearing, and biceps tendinopathy as all being potential sources of the pain.  There are numerous non-operative and operative treatment options for each of these conditions.     Plan  We will start off by treating the patient's shoulder conservatively (AKA non-operatively).  We gave the patient a prescription for physical therapy to work on increasing the range of motion and strength in the shoulder.       We can see the patient back as needed.  The patient should call the office during business hours (9am-3pm; Monday - Friday)  with any questions or problems.  If the patient has any urgent issues outside of business hours, they should go to a local Emergency Room.

## 2023-12-13 LAB
HOLD SPECIMEN: NORMAL
HOLD SPECIMEN: NORMAL

## 2023-12-21 ENCOUNTER — ANCILLARY PROCEDURE (OUTPATIENT)
Dept: EMERGENCY MEDICINE | Facility: HOSPITAL | Age: 88
End: 2023-12-21
Payer: MEDICARE

## 2023-12-21 ENCOUNTER — APPOINTMENT (OUTPATIENT)
Dept: RADIOLOGY | Facility: HOSPITAL | Age: 88
End: 2023-12-21
Payer: MEDICARE

## 2023-12-21 ENCOUNTER — HOSPITAL ENCOUNTER (OUTPATIENT)
Facility: HOSPITAL | Age: 88
Setting detail: OBSERVATION
Discharge: NURSING, REHAB OR LONG-TERM CARE | End: 2023-12-23
Attending: EMERGENCY MEDICINE | Admitting: SURGERY
Payer: MEDICARE
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
## 2023-12-21 DIAGNOSIS — S32.422A: ICD-10-CM

## 2023-12-21 DIAGNOSIS — S32.402A CLOSED NONDISPLACED FRACTURE OF LEFT ACETABULUM, UNSPECIFIED PORTION OF ACETABULUM, INITIAL ENCOUNTER (MULTI): Primary | ICD-10-CM

## 2023-12-21 PROBLEM — I21.4 NON-Q WAVE MYOCARDIAL INFARCTION (MULTI): Status: ACTIVE | Noted: 2023-12-21

## 2023-12-21 PROBLEM — R10.2 PELVIC PAIN IN FEMALE: Status: ACTIVE | Noted: 2023-12-21

## 2023-12-21 PROBLEM — I44.1 MOBITZ I: Status: ACTIVE | Noted: 2023-12-21

## 2023-12-21 PROBLEM — I21.4 NSTEMI, INITIAL EPISODE OF CARE (MULTI): Status: ACTIVE | Noted: 2023-12-21

## 2023-12-21 PROBLEM — Z85.828 PERSONAL HISTORY OF OTHER MALIGNANT NEOPLASM OF SKIN: Status: ACTIVE | Noted: 2022-03-29

## 2023-12-21 LAB
ABO GROUP (TYPE) IN BLOOD: NORMAL
ALBUMIN SERPL BCP-MCNC: 3.2 G/DL (ref 3.4–5)
ALP SERPL-CCNC: 134 U/L (ref 33–136)
ALT SERPL W P-5'-P-CCNC: 44 U/L (ref 7–45)
ANION GAP SERPL CALC-SCNC: 11 MMOL/L (ref 10–20)
ANTIBODY SCREEN: NORMAL
APTT PPP: 32 SECONDS (ref 27–38)
AST SERPL W P-5'-P-CCNC: 42 U/L (ref 9–39)
ATRIAL RATE: 78 BPM
BILIRUB SERPL-MCNC: 0.3 MG/DL (ref 0–1.2)
BUN SERPL-MCNC: 17 MG/DL (ref 6–23)
CALCIUM SERPL-MCNC: 9.4 MG/DL (ref 8.6–10.6)
CFT BLD TEG: 0.8 MIN (ref 0.8–2.1)
CHLORIDE SERPL-SCNC: 101 MMOL/L (ref 98–107)
CLOT ANGLE BLD TEG: 79 DEG (ref 63–78)
CLOT INIT BLD TEG: 4 MIN (ref 4.6–9.1)
CLOT INIT P HPASE BLD TEG: 4 MIN (ref 4.3–8.3)
CO2 SERPL-SCNC: 25 MMOL/L (ref 21–32)
CREAT SERPL-MCNC: 0.53 MG/DL (ref 0.5–1.05)
ERYTHROCYTE [DISTWIDTH] IN BLOOD BY AUTOMATED COUNT: 13.2 % (ref 11.5–14.5)
ERYTHROCYTE [DISTWIDTH] IN BLOOD BY AUTOMATED COUNT: 13.3 % (ref 11.5–14.5)
FIBRINOGEN BLD CALC-MCNC: 650 MG/DL (ref 278–581)
GFR SERPL CREATININE-BSD FRML MDRD: 87 ML/MIN/1.73M*2
GLUCOSE SERPL-MCNC: 92 MG/DL (ref 74–99)
HCT VFR BLD AUTO: 33.3 % (ref 36–46)
HCT VFR BLD AUTO: 35 % (ref 36–46)
HGB BLD-MCNC: 11.8 G/DL (ref 12–16)
HGB BLD-MCNC: 12.3 G/DL (ref 12–16)
INR PPP: 1.1 (ref 0.9–1.1)
MCF BLD TEG: 36 MM (ref 15–32)
MCF BLD TEG: 68 MM (ref 52–69)
MCF BLD TEG: 69 MM (ref 52–70)
MCH RBC QN AUTO: 34.1 PG (ref 26–34)
MCH RBC QN AUTO: 34.1 PG (ref 26–34)
MCHC RBC AUTO-ENTMCNC: 35.1 G/DL (ref 32–36)
MCHC RBC AUTO-ENTMCNC: 35.4 G/DL (ref 32–36)
MCV RBC AUTO: 96 FL (ref 80–100)
MCV RBC AUTO: 97 FL (ref 80–100)
NRBC BLD-RTO: 0 /100 WBCS (ref 0–0)
NRBC BLD-RTO: 0 /100 WBCS (ref 0–0)
P AXIS: 43 DEGREES
P OFFSET: 132 MS
P ONSET: 69 MS
PLATELET # BLD AUTO: 427 X10*3/UL (ref 150–450)
PLATELET # BLD AUTO: 437 X10*3/UL (ref 150–450)
POTASSIUM SERPL-SCNC: 3.5 MMOL/L (ref 3.5–5.3)
PR INTERVAL: 306 MS
PROT SERPL-MCNC: 5.7 G/DL (ref 6.4–8.2)
PROTHROMBIN TIME: 12.8 SECONDS (ref 9.8–12.8)
Q ONSET: 222 MS
QRS COUNT: 13 BEATS
QRS DURATION: 86 MS
QT INTERVAL: 376 MS
QTC CALCULATION(BAZETT): 428 MS
QTC FREDERICIA: 410 MS
R AXIS: 1 DEGREES
RBC # BLD AUTO: 3.46 X10*6/UL (ref 4–5.2)
RBC # BLD AUTO: 3.61 X10*6/UL (ref 4–5.2)
RH FACTOR (ANTIGEN D): NORMAL
SODIUM SERPL-SCNC: 133 MMOL/L (ref 136–145)
T AXIS: -13 DEGREES
T OFFSET: 410 MS
VENTRICULAR RATE: 78 BPM
WBC # BLD AUTO: 7.4 X10*3/UL (ref 4.4–11.3)
WBC # BLD AUTO: 8.1 X10*3/UL (ref 4.4–11.3)

## 2023-12-21 PROCEDURE — 73560 X-RAY EXAM OF KNEE 1 OR 2: CPT | Mod: LT

## 2023-12-21 PROCEDURE — 85730 THROMBOPLASTIN TIME PARTIAL: CPT | Performed by: STUDENT IN AN ORGANIZED HEALTH CARE EDUCATION/TRAINING PROGRAM

## 2023-12-21 PROCEDURE — 71045 X-RAY EXAM CHEST 1 VIEW: CPT

## 2023-12-21 PROCEDURE — 86850 RBC ANTIBODY SCREEN: CPT

## 2023-12-21 PROCEDURE — 85027 COMPLETE CBC AUTOMATED: CPT | Mod: 91

## 2023-12-21 PROCEDURE — 72192 CT PELVIS W/O DYE: CPT | Mod: RSC | Performed by: RADIOLOGY

## 2023-12-21 PROCEDURE — 72190 X-RAY EXAM OF PELVIS: CPT | Performed by: RADIOLOGY

## 2023-12-21 PROCEDURE — 73590 X-RAY EXAM OF LOWER LEG: CPT | Mod: LT

## 2023-12-21 PROCEDURE — 76377 3D RENDER W/INTRP POSTPROCES: CPT | Mod: CCI

## 2023-12-21 PROCEDURE — 2500000001 HC RX 250 WO HCPCS SELF ADMINISTERED DRUGS (ALT 637 FOR MEDICARE OP)

## 2023-12-21 PROCEDURE — 2500000001 HC RX 250 WO HCPCS SELF ADMINISTERED DRUGS (ALT 637 FOR MEDICARE OP): Performed by: STUDENT IN AN ORGANIZED HEALTH CARE EDUCATION/TRAINING PROGRAM

## 2023-12-21 PROCEDURE — 97162 PT EVAL MOD COMPLEX 30 MIN: CPT | Mod: GP | Performed by: PHYSICAL THERAPIST

## 2023-12-21 PROCEDURE — 71045 X-RAY EXAM CHEST 1 VIEW: CPT | Performed by: RADIOLOGY

## 2023-12-21 PROCEDURE — G0378 HOSPITAL OBSERVATION PER HR: HCPCS

## 2023-12-21 PROCEDURE — 2500000004 HC RX 250 GENERAL PHARMACY W/ HCPCS (ALT 636 FOR OP/ED): Performed by: STUDENT IN AN ORGANIZED HEALTH CARE EDUCATION/TRAINING PROGRAM

## 2023-12-21 PROCEDURE — 2500000005 HC RX 250 GENERAL PHARMACY W/O HCPCS

## 2023-12-21 PROCEDURE — 72128 CT CHEST SPINE W/O DYE: CPT | Performed by: RADIOLOGY

## 2023-12-21 PROCEDURE — 27220 TREAT HIP SOCKET FRACTURE: CPT | Performed by: ORTHOPAEDIC SURGERY

## 2023-12-21 PROCEDURE — 72131 CT LUMBAR SPINE W/O DYE: CPT | Performed by: RADIOLOGY

## 2023-12-21 PROCEDURE — 72131 CT LUMBAR SPINE W/O DYE: CPT

## 2023-12-21 PROCEDURE — 73610 X-RAY EXAM OF ANKLE: CPT | Mod: LEFT SIDE | Performed by: RADIOLOGY

## 2023-12-21 PROCEDURE — 80053 COMPREHEN METABOLIC PANEL: CPT | Performed by: STUDENT IN AN ORGANIZED HEALTH CARE EDUCATION/TRAINING PROGRAM

## 2023-12-21 PROCEDURE — 96361 HYDRATE IV INFUSION ADD-ON: CPT

## 2023-12-21 PROCEDURE — 85027 COMPLETE CBC AUTOMATED: CPT | Performed by: EMERGENCY MEDICINE

## 2023-12-21 PROCEDURE — 99285 EMERGENCY DEPT VISIT HI MDM: CPT | Performed by: EMERGENCY MEDICINE

## 2023-12-21 PROCEDURE — 93005 ELECTROCARDIOGRAM TRACING: CPT

## 2023-12-21 PROCEDURE — 2550000001 HC RX 255 CONTRASTS: Performed by: EMERGENCY MEDICINE

## 2023-12-21 PROCEDURE — 73610 X-RAY EXAM OF ANKLE: CPT | Mod: LT

## 2023-12-21 PROCEDURE — 2500000004 HC RX 250 GENERAL PHARMACY W/ HCPCS (ALT 636 FOR OP/ED)

## 2023-12-21 PROCEDURE — 36415 COLL VENOUS BLD VENIPUNCTURE: CPT

## 2023-12-21 PROCEDURE — 72192 CT PELVIS W/O DYE: CPT | Mod: RSC

## 2023-12-21 PROCEDURE — 73590 X-RAY EXAM OF LOWER LEG: CPT | Mod: LEFT SIDE | Performed by: RADIOLOGY

## 2023-12-21 PROCEDURE — 72128 CT CHEST SPINE W/O DYE: CPT

## 2023-12-21 PROCEDURE — 85347 COAGULATION TIME ACTIVATED: CPT | Mod: 91

## 2023-12-21 PROCEDURE — 99222 1ST HOSP IP/OBS MODERATE 55: CPT | Performed by: ORTHOPAEDIC SURGERY

## 2023-12-21 PROCEDURE — 73560 X-RAY EXAM OF KNEE 1 OR 2: CPT | Mod: LEFT SIDE | Performed by: RADIOLOGY

## 2023-12-21 PROCEDURE — 99222 1ST HOSP IP/OBS MODERATE 55: CPT | Performed by: SURGERY

## 2023-12-21 PROCEDURE — 74174 CTA ABD&PLVS W/CONTRAST: CPT

## 2023-12-21 PROCEDURE — 93010 ELECTROCARDIOGRAM REPORT: CPT | Performed by: EMERGENCY MEDICINE

## 2023-12-21 PROCEDURE — 96374 THER/PROPH/DIAG INJ IV PUSH: CPT

## 2023-12-21 PROCEDURE — 99285 EMERGENCY DEPT VISIT HI MDM: CPT | Mod: 25 | Performed by: EMERGENCY MEDICINE

## 2023-12-21 PROCEDURE — 36600 WITHDRAWAL OF ARTERIAL BLOOD: CPT

## 2023-12-21 PROCEDURE — 72190 X-RAY EXAM OF PELVIS: CPT

## 2023-12-21 PROCEDURE — 74174 CTA ABD&PLVS W/CONTRAST: CPT | Performed by: RADIOLOGY

## 2023-12-21 PROCEDURE — 99222 1ST HOSP IP/OBS MODERATE 55: CPT

## 2023-12-21 RX ORDER — ENOXAPARIN SODIUM 100 MG/ML
30 INJECTION SUBCUTANEOUS EVERY 12 HOURS
Status: CANCELLED | OUTPATIENT
Start: 2023-12-21

## 2023-12-21 RX ORDER — MORPHINE SULFATE 4 MG/ML
2 INJECTION INTRAVENOUS ONCE
Status: COMPLETED | OUTPATIENT
Start: 2023-12-21 | End: 2023-12-21

## 2023-12-21 RX ORDER — ADHESIVE BANDAGE
30 BANDAGE TOPICAL DAILY PRN
COMMUNITY
End: 2024-02-14 | Stop reason: ALTCHOICE

## 2023-12-21 RX ORDER — ACETAMINOPHEN 325 MG/1
650 TABLET ORAL EVERY 6 HOURS
Status: DISCONTINUED | OUTPATIENT
Start: 2023-12-21 | End: 2023-12-23

## 2023-12-21 RX ORDER — LIDOCAINE 560 MG/1
1 PATCH PERCUTANEOUS; TOPICAL; TRANSDERMAL DAILY
COMMUNITY

## 2023-12-21 RX ORDER — OXYCODONE HYDROCHLORIDE 5 MG/1
5 TABLET ORAL EVERY 6 HOURS PRN
Status: DISCONTINUED | OUTPATIENT
Start: 2023-12-21 | End: 2023-12-23 | Stop reason: HOSPADM

## 2023-12-21 RX ORDER — LIDOCAINE 560 MG/1
1 PATCH PERCUTANEOUS; TOPICAL; TRANSDERMAL DAILY
COMMUNITY
End: 2024-02-14 | Stop reason: ALTCHOICE

## 2023-12-21 RX ORDER — ACETAMINOPHEN 325 MG/1
650 TABLET ORAL ONCE
Status: COMPLETED | OUTPATIENT
Start: 2023-12-21 | End: 2023-12-21

## 2023-12-21 RX ORDER — ACETAMINOPHEN 325 MG/1
650 TABLET ORAL 3 TIMES DAILY
COMMUNITY
End: 2023-12-23 | Stop reason: HOSPADM

## 2023-12-21 RX ORDER — NALOXONE HYDROCHLORIDE 0.4 MG/ML
0.2 INJECTION, SOLUTION INTRAMUSCULAR; INTRAVENOUS; SUBCUTANEOUS EVERY 5 MIN PRN
Status: DISCONTINUED | OUTPATIENT
Start: 2023-12-21 | End: 2023-12-21

## 2023-12-21 RX ORDER — AMOXICILLIN 250 MG
1 CAPSULE ORAL
COMMUNITY
End: 2024-02-14 | Stop reason: ALTCHOICE

## 2023-12-21 RX ORDER — SENNOSIDES 8.6 MG/1
2 TABLET ORAL 2 TIMES DAILY
Status: DISCONTINUED | OUTPATIENT
Start: 2023-12-21 | End: 2023-12-23 | Stop reason: HOSPADM

## 2023-12-21 RX ORDER — IBUPROFEN 600 MG/1
600 TABLET ORAL EVERY 6 HOURS PRN
Status: DISCONTINUED | OUTPATIENT
Start: 2023-12-21 | End: 2023-12-23 | Stop reason: HOSPADM

## 2023-12-21 RX ORDER — CEFTRIAXONE 1 G/50ML
1 INJECTION, SOLUTION INTRAVENOUS EVERY 24 HOURS
Status: DISPENSED | OUTPATIENT
Start: 2023-12-21 | End: 2023-12-22

## 2023-12-21 RX ORDER — LIDOCAINE 560 MG/1
1 PATCH PERCUTANEOUS; TOPICAL; TRANSDERMAL DAILY
Status: DISCONTINUED | OUTPATIENT
Start: 2023-12-21 | End: 2023-12-21

## 2023-12-21 RX ORDER — SODIUM CHLORIDE, SODIUM LACTATE, POTASSIUM CHLORIDE, CALCIUM CHLORIDE 600; 310; 30; 20 MG/100ML; MG/100ML; MG/100ML; MG/100ML
75 INJECTION, SOLUTION INTRAVENOUS CONTINUOUS
Status: DISCONTINUED | OUTPATIENT
Start: 2023-12-21 | End: 2023-12-21

## 2023-12-21 RX ORDER — ACETAMINOPHEN 500 MG
500 TABLET ORAL EVERY 12 HOURS PRN
COMMUNITY
End: 2023-12-23 | Stop reason: HOSPADM

## 2023-12-21 RX ORDER — ACETAMINOPHEN 325 MG/1
975 TABLET ORAL EVERY 6 HOURS SCHEDULED
Status: DISCONTINUED | OUTPATIENT
Start: 2023-12-21 | End: 2023-12-21

## 2023-12-21 RX ORDER — POLYETHYLENE GLYCOL 3350 17 G/17G
17 POWDER, FOR SOLUTION ORAL DAILY
Status: DISCONTINUED | OUTPATIENT
Start: 2023-12-21 | End: 2023-12-23 | Stop reason: HOSPADM

## 2023-12-21 RX ORDER — OXYCODONE HYDROCHLORIDE 5 MG/1
2.5 TABLET ORAL EVERY 6 HOURS PRN
Status: DISCONTINUED | OUTPATIENT
Start: 2023-12-21 | End: 2023-12-23 | Stop reason: HOSPADM

## 2023-12-21 RX ORDER — OXYCODONE HYDROCHLORIDE 5 MG/1
10 TABLET ORAL EVERY 4 HOURS PRN
Status: DISCONTINUED | OUTPATIENT
Start: 2023-12-21 | End: 2023-12-21

## 2023-12-21 RX ORDER — POLYETHYLENE GLYCOL 400 AND PROPYLENE GLYCOL 4; 3 MG/ML; MG/ML
1 SOLUTION/ DROPS OPHTHALMIC 2 TIMES DAILY PRN
COMMUNITY

## 2023-12-21 RX ORDER — DOCUSATE SODIUM 100 MG/1
100 CAPSULE, LIQUID FILLED ORAL EVERY MORNING
COMMUNITY

## 2023-12-21 RX ORDER — OXYCODONE HYDROCHLORIDE 5 MG/1
5 TABLET ORAL EVERY 6 HOURS PRN
Status: DISCONTINUED | OUTPATIENT
Start: 2023-12-21 | End: 2023-12-21

## 2023-12-21 RX ORDER — DEXTROMETHORPHAN POLISTIREX 30 MG/5 ML
1 SUSPENSION, EXTENDED RELEASE 12 HR ORAL DAILY PRN
COMMUNITY
End: 2024-02-14 | Stop reason: ALTCHOICE

## 2023-12-21 RX ORDER — OXYCODONE HYDROCHLORIDE 5 MG/1
2.5 TABLET ORAL EVERY 4 HOURS PRN
Status: DISCONTINUED | OUTPATIENT
Start: 2023-12-21 | End: 2023-12-21

## 2023-12-21 RX ADMIN — POLYETHYLENE GLYCOL 3350 17 G: 17 POWDER, FOR SOLUTION ORAL at 08:05

## 2023-12-21 RX ADMIN — ACETAMINOPHEN 650 MG: 325 TABLET ORAL at 08:05

## 2023-12-21 RX ADMIN — OXYCODONE HYDROCHLORIDE 5 MG: 5 TABLET ORAL at 21:47

## 2023-12-21 RX ADMIN — IOHEXOL 75 ML: 350 INJECTION, SOLUTION INTRAVENOUS at 03:14

## 2023-12-21 RX ADMIN — ACETAMINOPHEN 650 MG: 325 TABLET ORAL at 21:20

## 2023-12-21 RX ADMIN — SODIUM CHLORIDE, POTASSIUM CHLORIDE, SODIUM LACTATE AND CALCIUM CHLORIDE 75 ML/HR: 600; 310; 30; 20 INJECTION, SOLUTION INTRAVENOUS at 08:05

## 2023-12-21 RX ADMIN — OXYCODONE HYDROCHLORIDE 5 MG: 5 TABLET ORAL at 13:40

## 2023-12-21 RX ADMIN — LIDOCAINE 1 PATCH: 4 PATCH TOPICAL at 07:19

## 2023-12-21 RX ADMIN — SENNOSIDES 17.2 MG: 8.6 TABLET, FILM COATED ORAL at 21:20

## 2023-12-21 RX ADMIN — OXYCODONE HYDROCHLORIDE 5 MG: 5 TABLET ORAL at 07:19

## 2023-12-21 RX ADMIN — SENNOSIDES 17.2 MG: 8.6 TABLET, FILM COATED ORAL at 08:05

## 2023-12-21 RX ADMIN — MORPHINE SULFATE 2 MG: 4 INJECTION INTRAVENOUS at 02:28

## 2023-12-21 RX ADMIN — ACETAMINOPHEN 975 MG: 325 TABLET ORAL at 06:45

## 2023-12-21 RX ADMIN — ACETAMINOPHEN 975 MG: 325 TABLET ORAL at 13:38

## 2023-12-21 SDOH — SOCIAL STABILITY: SOCIAL INSECURITY: ABUSE: ADULT

## 2023-12-21 SDOH — SOCIAL STABILITY: SOCIAL INSECURITY: ARE THERE ANY APPARENT SIGNS OF INJURIES/BEHAVIORS THAT COULD BE RELATED TO ABUSE/NEGLECT?: NO

## 2023-12-21 SDOH — SOCIAL STABILITY: SOCIAL INSECURITY: HAS ANYONE EVER THREATENED TO HURT YOUR FAMILY OR YOUR PETS?: NO

## 2023-12-21 SDOH — SOCIAL STABILITY: SOCIAL INSECURITY: HAVE YOU HAD THOUGHTS OF HARMING ANYONE ELSE?: NO

## 2023-12-21 SDOH — SOCIAL STABILITY: SOCIAL INSECURITY: DO YOU FEEL ANYONE HAS EXPLOITED OR TAKEN ADVANTAGE OF YOU FINANCIALLY OR OF YOUR PERSONAL PROPERTY?: NO

## 2023-12-21 SDOH — SOCIAL STABILITY: SOCIAL INSECURITY: DOES ANYONE TRY TO KEEP YOU FROM HAVING/CONTACTING OTHER FRIENDS OR DOING THINGS OUTSIDE YOUR HOME?: NO

## 2023-12-21 SDOH — SOCIAL STABILITY: SOCIAL INSECURITY: DO YOU FEEL UNSAFE GOING BACK TO THE PLACE WHERE YOU ARE LIVING?: NO

## 2023-12-21 SDOH — SOCIAL STABILITY: SOCIAL INSECURITY: ARE YOU OR HAVE YOU BEEN THREATENED OR ABUSED PHYSICALLY, EMOTIONALLY, OR SEXUALLY BY ANYONE?: NO

## 2023-12-21 ASSESSMENT — ACTIVITIES OF DAILY LIVING (ADL)
ADEQUATE_TO_COMPLETE_ADL: YES
HEARING - LEFT EAR: HEARING AID
HEARING - RIGHT EAR: HEARING AID
GROOMING: INDEPENDENT
BATHING: INDEPENDENT
TOILETING: INDEPENDENT
WALKS IN HOME: NEEDS ASSISTANCE
ASSISTIVE_DEVICE: WALKER
DRESSING YOURSELF: INDEPENDENT
PATIENT'S MEMORY ADEQUATE TO SAFELY COMPLETE DAILY ACTIVITIES?: YES
JUDGMENT_ADEQUATE_SAFELY_COMPLETE_DAILY_ACTIVITIES: YES
LACK_OF_TRANSPORTATION: NO
FEEDING YOURSELF: INDEPENDENT

## 2023-12-21 ASSESSMENT — ENCOUNTER SYMPTOMS
RHINORRHEA: 0
LIGHT-HEADEDNESS: 0
SHORTNESS OF BREATH: 0
EYE DISCHARGE: 0
BACK PAIN: 0
ABDOMINAL DISTENTION: 0
EYE PAIN: 0
DIZZINESS: 0
NECK PAIN: 0
SINUS PAIN: 0
CONFUSION: 0
NERVOUS/ANXIOUS: 1
CHEST TIGHTNESS: 0
AGITATION: 1
ABDOMINAL PAIN: 1
FACIAL SWELLING: 0

## 2023-12-21 ASSESSMENT — COLUMBIA-SUICIDE SEVERITY RATING SCALE - C-SSRS
2. HAVE YOU ACTUALLY HAD ANY THOUGHTS OF KILLING YOURSELF?: NO
1. IN THE PAST MONTH, HAVE YOU WISHED YOU WERE DEAD OR WISHED YOU COULD GO TO SLEEP AND NOT WAKE UP?: NO
6. HAVE YOU EVER DONE ANYTHING, STARTED TO DO ANYTHING, OR PREPARED TO DO ANYTHING TO END YOUR LIFE?: NO

## 2023-12-21 ASSESSMENT — COGNITIVE AND FUNCTIONAL STATUS - GENERAL
PATIENT BASELINE BEDBOUND: NO
WALKING IN HOSPITAL ROOM: A LOT
MOVING FROM LYING ON BACK TO SITTING ON SIDE OF FLAT BED WITH BEDRAILS: A LOT
STANDING UP FROM CHAIR USING ARMS: A LITTLE
CLIMB 3 TO 5 STEPS WITH RAILING: TOTAL
MOVING TO AND FROM BED TO CHAIR: A LOT
MOVING FROM LYING ON BACK TO SITTING ON SIDE OF FLAT BED WITH BEDRAILS: A LITTLE
STANDING UP FROM CHAIR USING ARMS: A LOT
MOBILITY SCORE: 11
DAILY ACTIVITIY SCORE: 23
TOILETING: A LITTLE
TURNING FROM BACK TO SIDE WHILE IN FLAT BAD: A LITTLE
WALKING IN HOSPITAL ROOM: A LITTLE
MOBILITY SCORE: 18
MOVING TO AND FROM BED TO CHAIR: A LITTLE
TURNING FROM BACK TO SIDE WHILE IN FLAT BAD: A LOT
CLIMB 3 TO 5 STEPS WITH RAILING: A LITTLE

## 2023-12-21 ASSESSMENT — LIFESTYLE VARIABLES
HOW OFTEN DO YOU HAVE 6 OR MORE DRINKS ON ONE OCCASION: NEVER
EVER FELT BAD OR GUILTY ABOUT YOUR DRINKING: NO
HAVE PEOPLE ANNOYED YOU BY CRITICIZING YOUR DRINKING: NO
REASON UNABLE TO ASSESS: NO
HOW MANY STANDARD DRINKS CONTAINING ALCOHOL DO YOU HAVE ON A TYPICAL DAY: 1 OR 2
HOW OFTEN DO YOU HAVE A DRINK CONTAINING ALCOHOL: 4 OR MORE TIMES A WEEK
AUDIT-C TOTAL SCORE: 4
SKIP TO QUESTIONS 9-10: 1
EVER HAD A DRINK FIRST THING IN THE MORNING TO STEADY YOUR NERVES TO GET RID OF A HANGOVER: NO
HAVE YOU EVER FELT YOU SHOULD CUT DOWN ON YOUR DRINKING: NO
AUDIT-C TOTAL SCORE: 4

## 2023-12-21 ASSESSMENT — PAIN SCALES - GENERAL
PAINLEVEL_OUTOF10: 0 - NO PAIN
PAINLEVEL_OUTOF10: 0 - NO PAIN
PAINLEVEL_OUTOF10: 3
PAINLEVEL_OUTOF10: 0 - NO PAIN
PAINLEVEL_OUTOF10: 6
PAINLEVEL_OUTOF10: 10 - WORST POSSIBLE PAIN

## 2023-12-21 ASSESSMENT — PAIN DESCRIPTION - ORIENTATION: ORIENTATION: LEFT

## 2023-12-21 ASSESSMENT — PAIN - FUNCTIONAL ASSESSMENT
PAIN_FUNCTIONAL_ASSESSMENT: 0-10

## 2023-12-21 ASSESSMENT — PATIENT HEALTH QUESTIONNAIRE - PHQ9
SUM OF ALL RESPONSES TO PHQ9 QUESTIONS 1 & 2: 1
2. FEELING DOWN, DEPRESSED OR HOPELESS: SEVERAL DAYS
1. LITTLE INTEREST OR PLEASURE IN DOING THINGS: NOT AT ALL

## 2023-12-21 ASSESSMENT — PAIN DESCRIPTION - LOCATION: LOCATION: LEG

## 2023-12-21 NOTE — CONSULTS
Orthopaedic Surgery Consult H&P    HPI:   Orthopaedic Problems/Injuries: nondisplaced incomplete L transverse acetabulum fx, age-indeterminate L sacral ala insufficiency fx  Other Injuries: None    89F (fibromyalgia, PE/DVT 8/2023 on eliquis last dose yesterday AM, SIADH, UTIs w AMS) transfer from [facility] above after mGLF onto L side. Ambulatory w walker at baseline. Unable to ambulate since injury. Patient complains of pain all along left body. Also w c/f expanding pelvic hematoma, remote L pubic rami fxs.   PMH: per above/EMR  PSH: per above/EMR  SocHx:      -  Denies tobacco use      -  Denies EtOH use      -  Denies other drug use  FamHx:  Non-contributory to this patient's acute orthopaedic problem.   Allergies: Reviewed in EMR  Meds: Reviewed in EMR    ROS      - 14 point ROS negative except as above    Physical Exam:  Gen: AOx3, NAD  HEENT: normocephalic atraumatic  Psych: appropriate mood and affect  Resp: nonlabored breathing  Cardiac: Extremities WWP, RRR to peripheral palpation  Neuro: CN 2-12 grossly intact  Skin: no rashes    Left lower extremity:  - Skin intact   - Tender to palpation everywhere  - Fires EHL/DF/PF.  - Sensation intact to light touch in sural, saphenous, superficial/deep peroneal, tibial nerve distributions.  - 2+ DP pulse, < 2 seconds capillary refill.    A full secondary exam was performed and all relevant findings discussed and noted above.    Results for orders placed or performed during the hospital encounter of 12/21/23 (from the past 24 hour(s))   Comprehensive metabolic panel   Result Value Ref Range    Glucose 92 74 - 99 mg/dL    Sodium 133 (L) 136 - 145 mmol/L    Potassium 3.5 3.5 - 5.3 mmol/L    Chloride 101 98 - 107 mmol/L    Bicarbonate 25 21 - 32 mmol/L    Anion Gap 11 10 - 20 mmol/L    Urea Nitrogen 17 6 - 23 mg/dL    Creatinine 0.53 0.50 - 1.05 mg/dL    eGFR 87 >60 mL/min/1.73m*2    Calcium 9.4 8.6 - 10.6 mg/dL    Albumin 3.2 (L) 3.4 - 5.0 g/dL    Alkaline  Phosphatase 134 33 - 136 U/L    Total Protein 5.7 (L) 6.4 - 8.2 g/dL    AST 42 (H) 9 - 39 U/L    Bilirubin, Total 0.3 0.0 - 1.2 mg/dL    ALT 44 7 - 45 U/L   Coagulation Screen   Result Value Ref Range    Protime 12.8 9.8 - 12.8 seconds    INR 1.1 0.9 - 1.1    aPTT 32 27 - 38 seconds   CBC   Result Value Ref Range    WBC 7.4 4.4 - 11.3 x10*3/uL    nRBC 0.0 0.0 - 0.0 /100 WBCs    RBC 3.46 (L) 4.00 - 5.20 x10*6/uL    Hemoglobin 11.8 (L) 12.0 - 16.0 g/dL    Hematocrit 33.3 (L) 36.0 - 46.0 %    MCV 96 80 - 100 fL    MCH 34.1 (H) 26.0 - 34.0 pg    MCHC 35.4 32.0 - 36.0 g/dL    RDW 13.3 11.5 - 14.5 %    Platelets 437 150 - 450 x10*3/uL   TEG Clot Global Profile   Result Value Ref Range    R (Reaction Time) K 4.0 (L) 4.6 - 9.1 min    K (Clot Kinetics) 0.8 0.8 - 2.1 min    ANGLE 79.0 (H) 63.0 - 78.0 deg    MA (Max Amplitude) K 68.0 52.0 - 69.0 mm    R (Reaction Time) KH 4.0 (L) 4.3 - 8.3 min    MA (Max Amplitude) RT 69.0 52.0 - 70.0 mm    MA ( Godwin Amplitude) FF 36.0 (H) 15.0 - 32.0 mm    FLEV 650 (H) 278 - 581 mg/dL       XR tibia fibula left 2 views         XR ankle left 3+ views         XR pelvis 3+ views         XR chest 1 view         XR knee left 1-2 views   Preliminary Result   No acute osseous abnormality of the left knee, tibia/fibula, or ankle.        Severe degenerative changes of the left knee.        I personally reviewed the images/study and I agree with the findings   as stated by Denzel Fletcher MD. This study was interpreted at   [place], [place].        MACRO:   None             Dictation workstation:   [id number]      CT 3D reconstruction   Final Result   Acute nondisplaced fracture of the left acetabulum with associated   small pelvic sidewall/ hematoma.        Please see same day CTA abdomen pelvis for further details regarding   the soft tissue findings.        I personally reviewed the images/study and I agree with the findings   as stated by  Denzel Fletcher MD. This study was interpreted at   [place], [place].        MACRO:   None        Signed by: Dacia Mann 12/21/2023 4:42 AM   Dictation workstation:   [id number]      CT pelvis wo IV contrast   Final Result   Acute nondisplaced fracture of the left acetabulum with associated   small pelvic sidewall/ hematoma.        Please see same day CTA abdomen pelvis for further details regarding   the soft tissue findings.        I personally reviewed the images/study and I agree with the findings   as stated by Denzel Fletcher MD. This study was interpreted at   [place], [place].        MACRO:   None        Signed by: Dacia Mann 12/21/2023 4:42 AM   Dictation workstation:   [id number]      CT angio abdomen pelvis w and or wo IV IV contrast   Final Result   Small hematoma in the left pelvic sidewall and obturator musculature.   Mild subcutaneous contusion left posterolateral hip. No evidence of   active arterial hemorrhage.        Acute nondisplaced left acetabular fracture. The degree of osteopenia   somewhat limits evaluation, however, this is appears to be transverse   type fracture. Small left hip lipohemarthrosis.        Bilateral lower spigelian hernias containing bowel as described   above. No inflammatory changes in the hernia sac or bowel obstruction.        No evidence of abdominal aortic aneurysm or dissection. Multifocal   atherosclerosis of the abdominal aorta and its branches without   flow-limiting stenosis.        An 8 mm stone in the proximal pancreatic duct with diffuse upstream   dilatation of the duct measuring up to 11 mm in diameter. Moderate   left-greater-than-right hepatic lobe biliary duct dilatation.        I personally reviewed the images/study and I agree with the findings   as stated by Denzel Fletcher MD. This study was interpreted at   [facility]  [place], [place].        MACRO:   None.        Signed by: Dacia Mann 12/21/2023 4:30 AM   Dictation workstation:   [id number]      CT thoracic spine wo IV contrast   Final Result   No acute fracture or traumatic subluxation of the thoracic or lumbar   spine.        Multilevel degenerative changes of the thoracolumbar spine as   described above.        I personally reviewed the images/study and I agree with the findings   as stated by Denzel Fletcher MD. This study was interpreted at   [place], [place].        MACRO:   None        Signed by: Dacia Mann 12/21/2023 4:50 AM   Dictation workstation:   [id number]      CT lumbar spine wo IV contrast   Final Result   No acute fracture or traumatic subluxation of the thoracic or lumbar   spine.        Multilevel degenerative changes of the thoracolumbar spine as   described above.        I personally reviewed the images/study and I agree with the findings   as stated by Denzel Fletcher MD. This study was interpreted at   [place], [place].        MACRO:   None        Signed by: Dacia Mann 12/21/2023 4:50 AM   Dictation workstation:   [id number]          Assessment:  Orthopaedic Problems/Injuries: nondisplaced incomplete L transverse acetabulum fx, age-indeterminate L sacral ala insufficiency fx  Other Injuries: None    89F (fibromyalgia, PE/DVT 8/2023 on eliquis last dose yesterday AM, SIADH, UTIs w AMS) transfer from [facility] above after mGLF onto L side. Ambulatory w walker at baseline. Unable to ambulate since injury. Patient complains of pain all along left body. Also w c/f expanding pelvic hematoma, remote L pubic rami fxs. On exam, closed, NVI. Pain throughout all ROM of L hip and knee and palpation of entire L side, TTP L SI joint. Pending completion imaging. CT thin cuts w above.    Plan:  - No indication for acute ortho  operative intervention, closed treatment left acetabular fracture  - Weightbearing: WBAT Bilateral Lower Extremities   - Pain meds per primary team/ED  - Antibiotics: None from orthopaedic standpoint   - Diet: Okay for diet from orthopaedic standpoint    Dispo per primary/ED        >Patient should follow up w/ Dr. Marcelo  in 2 weeks (pt may call [phone] to schedule). Left pelvic x-ray 5 views      Luis A Lugo MD  On Call Resident - PGY-2 Orthopedic Surgery  [facility]  Epic Message Preferred  Pager: [phone]    This patient was seen within 30 minutes of initial consult.  _________________________________________________________

## 2023-12-21 NOTE — ED NOTES
Called and left Charlette patients daughter a voicemail letting her know that patient was being transferred to the floor and left room number and what tower she was being transferred to.      Dorene Terrazas RN  12/21/23 [id number]

## 2023-12-21 NOTE — PROGRESS NOTES
Pharmacy Medication History Review    Cassia Humphries is a 89 y.o. female admitted for Closed posterior wall acetabular fx, left, initial encounter (CMS/[facility]). Pharmacy reviewed the patient's [id number] medications and allergies for accuracy.    The list below reflects the updated PTA list. Comments regarding how patient may be taking medications differently can be found in the Admit Orders Activity  Prior to Admission Medications   Prescriptions Last Dose Informant   Lactobacillus acidophilus capsule     Sig: Take 1 capsule by mouth 2 times a day.   acetaminophen (Tylenol) 325 mg tablet     Sig: Take 2 tablets (650 mg) by mouth 3 times a day.   acetaminophen (Tylenol) 500 mg tablet     Sig: Take 1 tablet (500 mg) by mouth every 12 hours if needed for moderate pain (4 - 6).   apixaban (Eliquis) 5 mg tablet     Sig: Take 1 tablet (5 mg) by mouth 2 times a day.   ascorbic acid (Vitamin C) 1,000 mg tablet     Sig: Take 1 capsule by mouth once daily.   bisacodyl (Dulcolax) 10 mg suppository     Sig: Insert 1 suppository (10 mg) into the rectum once daily as needed for constipation (at bedtime if no BM 8 hours after MOM administration).   cholecalciferol (Vitamin D-3) 25 MCG (1000 UT) capsule     Sig: Take 1 capsule (25 mcg) by mouth once daily.   cyanocobalamin (Vitamin B-12) 500 mcg tablet     Sig: Take 1 tablet (500 mcg) by mouth once daily.   docusate sodium (Colace) 100 mg capsule     Sig: Take 1 capsule (100 mg) by mouth once daily in the morning.   furosemide (Lasix) 20 mg tablet Not Taking    Sig: Take 1 tablet (20 mg) by mouth once daily as needed (leg swelling).   Patient not taking: Reported on 12/21/2023   lidocaine 4 % patch     Sig: Place 1 patch on the skin once daily. Remove & discard patch within 12 hours or as directed by MD. Apply to back   lidocaine 4 % patch     Sig: Place 1 patch on the skin once daily. Remove & discard patch within 12 hours or as directed by MD. Apply to bilateral knees  topically in the morning for pain, Apply 1/2 patch to each knee   magnesium hydroxide (Milk of Magnesia) 400 mg/5 mL suspension     Sig: Take 30 mL by mouth once daily as needed for constipation (per bowel protocol administer once daily if no BM in 3 consecutive days).   mineral oil enema     Sig: Insert 133 mL (1 enema) into the rectum once daily as needed for constipation (if no BM 8 hours after receiving suppository IF NO BM WITHIN 1 HOUR AFTER RECEIVING ENEMA NOTIFY MD).   peg 400-propylene glycol (Systane, propylene glycoL,) 0.4-0.3 % drops ophthalmic drops     Sig: Administer 1 drop into both eyes 2 times a day as needed (dry eyes).   sennosides-docusate sodium (Corrie-Colace) 8.6-50 mg tablet     Sig: Take 1 tablet by mouth every 24 (twenty four) hours if needed for constipation.      Facility-Administered Medications: None       The list below reflects the updated allergy list. Please review each documented allergy for additional clarification and justification.  Allergies  Reviewed by Jozef Faust RN on 12/21/2023        Severity Reactions Comments    Lorazepam High Unknown             Patient was unable to be assessed for M2B at discharge. Patient admitted from SNF. Please re-access need for M2B closer to discharge.    Sources used to complete the med history include medication list from [facility] [place]    Below are additional concerns with the patient's PTA list.  N/A    Tonio Nelson PharmD  Transitions of Care Pharmacist   [facility]  Please reach out via Secure Chat for questions, or if no response call [employer] or [employer]

## 2023-12-21 NOTE — PROGRESS NOTES
Cassia Humphries is a 89 y.o. female on day 0 of admission presenting with Closed posterior wall acetabular fx, left, initial encounter (CMS/[facility]).    Sw met with pt and daughter who stated they would like pt to go to [facility] if facility can accept pt. Referral sent to facility.     LEO Cramer

## 2023-12-21 NOTE — PROGRESS NOTES
[facility]  TRAUMA SERVICE - PROGRESS NOTE    Patient Name: Cassia Humphries  MRN: [id number]  Admit Date: 1221  : 1932  AGE: 89 y.o.   GENDER: female  ==============================================================================  MECHANISM OF INJURY:   Ground-level fall at SNF    LOC (yes/no?): No  Anticoagulant / Anti-platelet Rx? (for what dx?): eliquis, PE  Referring Facility Name (N/A for scene EMR run): [facility]     INJURIES:   Left nondisplaced acute acetabular fracture     OTHER MEDICAL PROBLEMS:  PE  Afib  Anemia  HTN  Osteoarthritis  UTI      ==============================================================================  UPDATED PLAN OF CARE:  89 yo F who sustained a ground level fall at SNF. Was standing up from toilet, lost footing/balance, and fell toward her left. Denies LOC but reports headstrike. On Eliquis for prior PE and Afib. Her with L hip pain, found to have a L incomplete acetabular fracture. Family concerned about safety protocols in place at current SNF.    - Admit to Trauma Surgery under Observation Status  - Scheduled tylenol, prn ibuprofen, prn oxycodone 2.5/5  - Incentive spirometry  - Regular diet, HLIV  - Bowel regimen  - Home lasix 20 mg  - Will determine if pt is on an antibiotic course for recent UTI  - Start home Eliquis in PM  - AM CBC, trend H/H  - Per Ortho, no indication for intervention; WBAT BLE  - SCDs  - Dispo: SW to facilitate discharge to same vs. new facility    Seen and d/w Dr. Vicente Chowdary MD  General Surgery PGY4  Trauma Surgery [id number]    ==============================================================================  CHIEF COMPLAINT / OVERNIGHT EVENTS:   No acute issues. Seen by Ortho - no operative plans. Daughter at bedside concerned about safety of SNF.    MEDICAL HISTORY / ROS:  Admission history and ROS reviewed. Pertinent changes as follows:  - pain better controlled  - no nausea    PHYSICAL EXAM:  Heart Rate:   [67-90]   Temp:  [36.2 °C (97.2 °F)-36.9 °C (98.4 °F)]   Resp:  [16]   BP: (140-169)/(78-86)   SpO2:  [94 %-99 %]     Gen:  NAD, non-toxic appearing  HEENT:  Atraumatic, normocephalic  Eyes:  No scleral icterus  Card:  RRR  Resp:  Non-labored breathing on RA  Abd:  Soft, non-tender, non-distended  Ext:  WWP, no swelling of BLE; L hip tenderness to palpation  Skin:  Warm and dry  Neuro:  AOx3, no gross neurological deficits  Psych:  Appropriate mood and affect      IMAGING SUMMARY:  (summary of new imaging findings, not a copy of dictation)  CTA Pelvis: no active extravasation; small L pelvic hematoma.    LABS:  Results from last 7 days   Lab Units 12/21/23  0734 12/21/23  0214   WBC AUTO x10*3/uL 8.1 7.4   HEMOGLOBIN g/dL 12.3 11.8*   HEMATOCRIT % 35.0* 33.3*   PLATELETS AUTO x10*3/uL 427 437     Results from last 7 days   Lab Units 12/21/23  0210   APTT seconds 32   INR  1.1     Results from last 7 days   Lab Units 12/21/23  0210   SODIUM mmol/L 133*   POTASSIUM mmol/L 3.5   CHLORIDE mmol/L 101   CO2 mmol/L 25   BUN mg/dL 17   CREATININE mg/dL 0.53   CALCIUM mg/dL 9.4   PROTEIN TOTAL g/dL 5.7*   BILIRUBIN TOTAL mg/dL 0.3   ALK PHOS U/L 134   ALT U/L 44   AST U/L 42*   GLUCOSE mg/dL 92     Results from last 7 days   Lab Units 12/21/23  0210   BILIRUBIN TOTAL mg/dL 0.3           I have reviewed all medications, laboratory results, and imaging pertinent for today's encounter.

## 2023-12-21 NOTE — PROGRESS NOTES
Physical Therapy    Physical Therapy Evaluation    Patient Name: Cassia Humphries  MRN: [id number]  Today's Date: 12/21/2023   Time Calculation  Start Time: 1330  Stop Time: 1352  Time Calculation (min): 22 min    Assessment/Plan   PT Assessment  PT Assessment Results: Decreased strength, Decreased range of motion, Decreased endurance, Impaired balance, Decreased mobility, Decreased coordination, Pain  Rehab Prognosis: Good  End of Session Communication: Bedside nurse  Assessment Comment: pt is a 89 y.o female admitted after GLF with L acetabular fx. pt  would benefit from skilled services to improve overall functional mobiltiy  End of Session Patient Position: Bed, 2 rail up  IP OR SWING BED PT PLAN  Inpatient or Swing Bed: Inpatient  PT Plan  PT Plan: Skilled PT  PT Frequency: 5 times per week  PT Discharge Recommendations: Moderate intensity level of continued care  PT Recommended Transfer Status: Assist x1  PT - OK to Discharge: Yes (eval completed and recs made)      Subjective   General Visit Information:  General  Reason for Referral: pt admitted after GLF at SNF pt with .nondisplaced incomplete L transverse acetabulum fx, age-indeterminate L sacral ala insufficiency fx  Past Medical History Relevant to Rehab: pt with h/o PE, afib, anemia, HTN, OA and UTI  Family/Caregiver Present: Yes  Caregiver Feedback: daughter present and supportive during session. Assisted with history as well  Prior to Session Communication: Bedside nurse  Patient Position Received: Bed, 2 rail up  General Comment: pt agreeable and cooperative during PT evaluation  Home Living:  Home Living  Type of Home: House  Lives With: Alone (but daughter is there 99 % of the time recently. pt from SNF)  Home Adaptive Equipment:  (chair lift to second floor and has a walker and transport chair)  Prior Level of Function:  Prior Function Per Pt/Caregiver Report  Level of [place]: Needs assistance with ADLs, Needs assistance with homemaking,  Needs assistance with functional transfers  Ambulatory Assistance: Needs assistance  Gait:  (pt was ambulating with assist and ww)  Precautions:  Precautions  Hearing/Visual Limitations: pt is [place]  Precautions Comment: falls , Pt is WBAT B LE  Vital Signs:  Vital Signs  Heart Rate: 76  Heart Rate Source: Monitor  SpO2: 97 % (although with mobility dropped as low as 77-88% on RA)  BP: 150/78  BP Location: Left arm  BP Method: Automatic  Patient Position: Lying    Objective   Pain:  Pain Assessment  Pain Assessment: 0-10  Pain Score:  (pain not rated but screaming with mobility especially with touching around B ankles.)  Pain Type: Acute pain  Pain Location: Leg  Pain Orientation: Left  Cognition:  Cognition  Overall Cognitive Status: Within Functional Limits    General Assessments:  Activity Tolerance  Endurance: Tolerates less than 10 min exercise, no significant change in vital signs         Static Sitting Balance  Static Sitting-Balance Support: Feet unsupported  Static Sitting-Level of Assistance: Minimum assistance (initally mod assist)  Static Sitting-Comment/Number of Minutes: pt sat EOB for 10 mins with varying assist    Static Standing Balance  Static Standing-Comment/Number of Minutes: not attempted due to pain, desaturation and safety as pt unable to reach floor when sitting on bed  Functional Assessments:  Bed Mobility  Bed Mobility: Yes  Bed Mobility 1  Bed Mobility 1: Supine to sitting, Sitting to supine  Level of Assistance 1: Maximum assistance    Transfers  Transfer: No    Ambulation/Gait Training  Ambulation/Gait Training Performed: No    Stairs  Stairs: No  Extremity/Trunk Assessments:  RLE   RLE : Exceptions to WFL  Strength RLE  RLE Overall Strength: Other (Comment), Unable to assess (due to pain)  LLE   LLE : Exceptions to WFL  Strength LLE  LLE Overall Strength: Other (Comment), Unable to assess (due to pain)  Outcome Measures:  [facility] Basic Mobility  Turning from your back to your side while in  a flat bed without using bedrails: A lot  Moving from lying on your back to sitting on the side of a flat bed without using bedrails: A lot  Moving to and from bed to chair (including a wheelchair): A lot  Standing up from a chair using your arms (e.g. wheelchair or bedside chair): A lot  To walk in hospital room: A lot  Climbing 3-5 steps with railing: Total  Basic Mobility - Total Score: 11    Encounter Problems       Encounter Problems (Active)       Balance       STG - Maintains static sitting balance with upper extremity support with CGA for 5 mins  (Progressing)       Start:  12/21/23    Expected End:  01/04/24       INTERVENTIONS:  1. Practice sitting on the edge of a bed/mat with minimal support.  2. Educate patient about maintining total hip precautions while maintaining balance.  3. Educate patient about pressure relief.  4. Educate patient about use of assistive device.            Mobility       STG - Patient will ambulate 10 ft with ww and min assist  (Progressing)       Start:  12/21/23    Expected End:  01/04/24               Transfers       STG - Patient will perform bed mobility with min assist  (Progressing)       Start:  12/21/23    Expected End:  01/04/24            STG - Patient will transfer sit to and from stand with min assist  (Progressing)       Start:  12/21/23    Expected End:  01/04/24                   Education Documentation  Precautions, taught by Trang Kinney, PT at 12/21/2023  2:37 PM.  Learner: Patient  Readiness: Acceptance  Method: Explanation  Response: Needs Reinforcement    Body Mechanics, taught by Trang Kinney PT at 12/21/2023  2:37 PM.  Learner: Patient  Readiness: Acceptance  Method: Explanation  Response: Needs Reinforcement    Home Exercise Program, taught by Trang Kinney PT at 12/21/2023  2:37 PM.  Learner: Patient  Readiness: Acceptance  Method: Explanation  Response: Needs Reinforcement    Mobility Training, taught by Trang Kinney, PT  at 12/21/2023  2:37 PM.  Learner: Patient  Readiness: Acceptance  Method: Explanation  Response: Needs Reinforcement    Education Comments  No comments found.

## 2023-12-21 NOTE — H&P
[facility]  TRAUMA SERVICE - HISTORY AND PHYSICAL / CONSULT    Patient Name: Cassia Humphries  MRN: [id number]  Admit Date: 1221  : 1932  AGE: 89 y.o.   GENDER: female  ==============================================================================  MECHANISM OF INJURY / CHIEF COMPLAINT:   Patient is a 89 year old female who had a ground level fall at a SNF. Patient states that they were getting up off the toilet and were trying to pull their pants up when they fell on their left side. Patient denies any LOC but states that they hit their head. Patient initially was transferred to [place] ED for care. CT head, C-spine, and chest abdomen pelvis were done showing a left nondisplaced acetabular fracture. Patient complains of diffuse tenderness to palpation all other but daughter states that this is the patient's normal but notes that the patient has increased pain and TTP along the left hip.   LOC (yes/no?): No  Anticoagulant / Anti-platelet Rx? (for what dx?): SAM wilkinson  Referring Facility Name (N/A for scene EMR run): [place]    INJURIES:   Left nondisplaced acute acetabular fracture    OTHER MEDICAL PROBLEMS:  PE  Afib  Anemia  HTN  Osteoarthritis  UTI    INCIDENTAL FINDINGS:  Bilateral lower abdominal bowel-containing hernias, that on the left possibly producing mild obstructive physiology distal small bowel   chronic obstructing downstream pancreatic ductal stone producing upstream severe ductal dilation and pancreatic atrophy   Multifocal atherosclerosis of the abdominal aorta and its branches without flow-limiting stenosis.     ==============================================================================  ADMISSION PLAN OF CARE:  CT pan scan completed with CT T and L spine, no additional injuries   CTA of pelvis done for Left acetabular fracture with present hematoma, no active extravasation present  Ortho consulted for Left acetabular fracture, pending recs   TEG  ordered due to patient being on eliquis with hematoma, no acute findings on TEG, no eliquis reversal at this time.   Holding Eliquis and DVT ppx due to hematoma and fracture  Consultants notified (specialty, provider name, time): Ortho by ED    Disposition: Daughter concern of sending patient back to previous SNF due to safety reasons, Trauma admitting for observation for new SNF placement.     Patient discussed with Dr. Bunch    Total face to face time spent with patient/family of 20 minutes, with >50% of the time spent discussing plan of care/management, counseling/educating on disease processes, explaining results of diagnostic testing.     Ruben Webster PA-C  Trauma Surgery  [id number]    ==============================================================================  PAST MEDICAL HISTORY:   PMH: PE, Afib, Anemia, HTN, Osteoarthritis  Past Medical History:   Diagnosis Date    Anterior displaced type ii dens fracture, sequela 06/25/2019    Odontoid fracture, sequela    Encounter for general adult medical examination without abnormal findings 07/19/2019    Medicare annual wellness visit, initial    Hemangioma of skin and subcutaneous tissue 03/29/2022    Localized edema 05/28/2020    Bilateral edema of lower extremity    Multiple fractures of pelvis with stable disruption of pelvic ring, initial encounter for closed fracture (CMS/[facility]) 04/01/2019    Closed pelvic ring fracture, initial encounter    Pain in left shoulder 01/08/2019    Left shoulder pain    Personal history of other diseases of the musculoskeletal system and connective tissue 07/19/2019    History of low back pain    Personal history of other diseases of the musculoskeletal system and connective tissue 06/24/2019    History of neck pain    Primary osteoarthritis, right shoulder 07/19/2019    DJD of right shoulder    Segmental and somatic dysfunction of abdomen and other regions 11/03/2017    Somatic dysfunction of back    Squamous cell carcinoma of skin  of other parts of face 2022    Stiffness of left shoulder, not elsewhere classified 2019    Stiffness of left shoulder, not elsewhere classified    Syncope 10/30/2023    SYNCOPE    Unilateral primary osteoarthritis, right knee 2017    Right knee DJD    Unspecified displaced fracture of second cervical vertebra, initial encounter for closed fracture (CMS/[facility]) 2019    Closed odontoid fracture, initial encounter    Unspecified displaced fracture of second cervical vertebra, subsequent encounter for fracture with routine healing 2019    Closed odontoid fracture with routine healing, subsequent encounter    Unspecified symptoms and signs involving the genitourinary system     UTI symptoms    Urinary tract infection, site not specified     Frequent UTI     Last menstrual period: N/A    PSH: none  Past Surgical History:   Procedure Laterality Date    CT ANGIO NECK W  2019    CT NECK ANGIO W AND WO IV CONTRAST 2019 [facility] CLINICAL LEGACY    MR HEAD ANGIO WO IV CONTRAST  2016    MR HEAD ANGIO WO IV CONTRAST [facility]    MR NECK ANGIO WO IV CONTRAST  2016    MR NECK ANGIO WO IV CONTRAST 2016 [facility]    OTHER SURGICAL HISTORY  2019    No history of surgery     FH: non-contributory  Family History   Problem Relation Name Age of Onset    Aortic aneurysm Father       SOCIAL HISTORY:    Smoking: quit smoking more than 40 years ago   Social History     Tobacco Use   Smoking Status Former    Packs/day: 1.00    Years: 10.00    Additional pack years: 0.00    Total pack years: 10.00    Types: Cigarettes    Quit date:     Years since quittin.0   Smokeless Tobacco Never       Alcohol: denies  Social History     Substance and Sexual Activity   Alcohol Use None       Drug use: denies    MEDICATIONS: eliquis, cefuroxime, previously on lisinopril   Prior to Admission medications    Medication Sig Start Date End Date Taking? Authorizing  Provider   acetaminophen (Tylenol 8 HOUR) 650 mg ER tablet Take 2 tablets (1,300 mg) by mouth 2 times a day. Do not crush, chew, or split.    Historical Provider, MD   apixaban (Eliquis) 5 mg tablet Take 1 tablet (5 mg) by mouth 2 times a day.    Historical Provider, MD   ascorbic acid, vitamin C, 500 mg capsule Take 1 capsule by mouth once daily.    Historical Provider, MD   bisacodyl (Dulcolax) 10 mg suppository Insert 1 suppository (10 mg) into the rectum 1 time if needed for constipation.    Historical Provider, MD   cholecalciferol (Vitamin D-3) 25 MCG (1000 UT) capsule Take 1 capsule (25 mcg) by mouth once daily.    Historical Provider, MD   cyanocobalamin (Vitamin B-12) 500 mcg tablet Take 1 tablet (500 mcg) by mouth once daily.    Historical Provider, MD   dicyclomine (Bentyl) 10 mg capsule Take 1 capsule (10 mg) by mouth every 6 hours if needed. 5/23/23   Historical Provider, MD   fluocinolone and shower cap 0.01 % oil 1 Application 10/20/21   Historical Provider, MD   furosemide (Lasix) 20 mg tablet Take 1 tablet (20 mg) by mouth once daily as needed (leg swelling). 10/9/23 11/8/23  Alex Shrestha MD   ketoconazole (NIZOral) 2 % shampoo 1 Application 10/20/21   Historical Provider, MD     ALLERGIES: ativan, antipsychotics   No Known Allergies    REVIEW OF SYSTEMS:  Review of Systems   HENT:  Negative for ear pain, facial swelling, rhinorrhea and sinus pain.    Eyes:  Negative for pain and discharge.   Respiratory:  Negative for chest tightness and shortness of breath.    Cardiovascular:  Negative for chest pain.   Gastrointestinal:  Positive for abdominal pain. Negative for abdominal distention.   Genitourinary:  Positive for pelvic pain.   Musculoskeletal:  Negative for back pain and neck pain.        Left hip pain   Neurological:  Negative for dizziness and light-headedness.   Psychiatric/Behavioral:  Positive for agitation. Negative for behavioral problems and confusion. The patient is  nervous/anxious.      PHYSICAL EXAM:  PRIMARY SURVEY:  Airway  Airway is patent.     Breathing  Breathing is normal. Right breath sounds are normal. Left breath sounds are normal.     Circulation  Cardiac rhythm is regular. Rate is regular.   Pulses  Radial: 2+ on the right; 2+ on the left.  Pedal: 2+ on the right; 2+ on the left.    Disability  [place] Coma Score  Eye:4   Verbal:5   Motor:6      15  Pupils  Right Pupil:   round and reactive        Left Pupil:   round and reactive           Motor Strength   strength:  5/5 on the right  5/5 on the left  Dorsiflex strength:  5/5 on the right  5/5 on the left  Plantarflex strength:  5/5 on the right  5/5 on the left  The patient does not have a sensory deficit.       SECONDARY SURVEY/PHYSICAL EXAM:  Secondary Survey:  NEURO: A&O x3, GCS 15, CN II-XII intact, GARCIA equally, muscle strength 5/5, no sensory deficits  HEAD: NC/AT, No lacerations or abrasions, no bony step offs, midface stable.  EENT: PERRL, EOMI. Pupils 4-2mm b/l. external ear without laceration. Nasal septum midline, no crepitus or septal hematoma. Oral mucosa and tongue without lacerations, teeth in place.   NECK: No cervical spine tenderness or step offs, no lacerations or abrasions, trachea midline. No JVD.  RESPIRATORY/CHEST: No abrasions, contusions, or crepitus. TTP on right side of chest. Non-labored, equal chest expansion, CTAB, no W/R/R.  CV: RRR, nml S1 and S2, no M/R/G. Pulses bilateral: 2+ radial and 2+DP   ABDOMEN: soft, nondistended. Diffusely tender throughout. Mass present of LLQ. No scars, abrasions or lacerations.  PELVIS: Stable to compression.  BACK/SPINE: No thoracic midline tenderness, step-offs or deformities. No lumbar midline tenderness, step-offs, or deformities.  No abrasions, hematomas or lacerations noted.  EXTREMITIES: No edema or cyanosis. TTP of left hip. Nml ROM w/o pain of all other extremities. No deformities, lacerations or contusions.   IMAGING SUMMARY:  (summary of  findings, not a copy of dictation)  CT Head/Face: no acute findings   CT C-Spine: no acute findings   CT Chest/Abd/Pelvis: Acute nondisplaced fracture of the left acetabulum with associated   small pelvic sidewall/ hematoma. ilateral lower abdominal bowel-containing hernias, that on the left possibly producing mild obstructive physiology distal small bowel   chronic obstructing downstream pancreatic ductal stone producing upstream severe ductal dilation and pancreatic atrophy, Multifocal atherosclerosis of the abdominal aorta and its branches without flow-limiting stenosis.   CXR/PXR: CXR: no acute findings  Other(s): L shoulder, L knee, and L ankle XR: no acute findings   CTA pelvis: no active extravasation     LABS:  Results from last 7 days   Lab Units 12/21/23  0214   WBC AUTO x10*3/uL 7.4   HEMOGLOBIN g/dL 11.8*   HEMATOCRIT % 33.3*   PLATELETS AUTO x10*3/uL 437     Results from last 7 days   Lab Units 12/21/23  0210   APTT seconds 32   INR  1.1     Results from last 7 days   Lab Units 12/21/23  0210   SODIUM mmol/L 133*   POTASSIUM mmol/L 3.5   CHLORIDE mmol/L 101   CO2 mmol/L 25   BUN mg/dL 17   CREATININE mg/dL 0.53   CALCIUM mg/dL 9.4   PROTEIN TOTAL g/dL 5.7*   BILIRUBIN TOTAL mg/dL 0.3   ALK PHOS U/L 134   ALT U/L 44   AST U/L 42*   GLUCOSE mg/dL 92     Results from last 7 days   Lab Units 12/21/23  0210   BILIRUBIN TOTAL mg/dL 0.3           I have reviewed all laboratory and imaging results ordered/pertinent for this encounter.

## 2023-12-21 NOTE — PROGRESS NOTES
Cassia Humphries is a 89 y.o. female on day 0 of admission presenting with Closed posterior wall acetabular fx, left, initial encounter (CMS/[facility]).    Gayatri met with pt following a consult for SNF. Pt daughter stated her mother has [facility] at [facility] [place] and feels she did not receive the best care nor was she supervised. Pt daughter Charlette stated she would like her mother to go to a different facility once she is cleared to weight bare. Pt daughter states her mother is an active member of the community. Gayatri provided pt daughter Charlette a list of facilities in the [facility] that accept [employer] medicare to review. Gayatri will reconnect with Charlette following her conversation with the attending.   Carla PINTOW, MSW

## 2023-12-21 NOTE — ED NOTES
Pt's daughter, Charlette, stated that pt still has 1-2 days of a course of antibiotics for a UTI, in which she believes to be Ceftriaxone. Called Dajuan with Trauma to discuss and look into it.     Manda Nixon RN  12/21/23 1736

## 2023-12-21 NOTE — ED TRIAGE NOTES
Pt came in as a transfer from [place] for an optho consult after having an unwitnessed fall on blood thinners. Pt has a disclocated right shoulder and a fractured left hip.

## 2023-12-21 NOTE — ED PROVIDER NOTES
CC: Fall     HPI:  Patient is a 89-year-old female with history of osteoarthritis, anemia, A-fib, recent PE on Eliquis, and recent admission for UTI on oral antibiotics, presenting to the ED as a transfer after ground-level fall.  Daughter states patient was getting off the toilet when she was witnessed to fall on her left side.  Patient denies any head strike or loss of consciousness.  Patient initially transferred to outside ED where CT of the head, C-spine, and Noncon abdomen pelvis were obtained noting a left nondisplaced acetabular fracture.  On arrival patient complaining of diffuse pain but worse over the left hip.      Limitations to History: None    Additional History Obtained from: Daughter    Records Reviewed: Recent available ED and inpatient notes reviewed in EMR.    PMHx/PSHx:  Per HPI.   - has a past medical history of Anterior displaced type ii dens fracture, sequela (06/25/2019), Encounter for general adult medical examination without abnormal findings (07/19/2019), Hemangioma of skin and subcutaneous tissue (03/29/2022), Localized edema (05/28/2020), Multiple fractures of pelvis with stable disruption of pelvic ring, initial encounter for closed fracture (CMS/[facility]) (04/01/2019), Pain in left shoulder (01/08/2019), Personal history of other diseases of the musculoskeletal system and connective tissue (07/19/2019), Personal history of other diseases of the musculoskeletal system and connective tissue (06/24/2019), Primary osteoarthritis, right shoulder (07/19/2019), Segmental and somatic dysfunction of abdomen and other regions (11/03/2017), Squamous cell carcinoma of skin of other parts of face (03/29/2022), Stiffness of left shoulder, not elsewhere classified (01/08/2019), Syncope (10/30/2023), Unilateral primary osteoarthritis, right knee (08/14/2017), Unspecified displaced fracture of second cervical vertebra, initial encounter for closed fracture (CMS/[facility]) (03/01/2019), Unspecified displaced  fracture of second cervical vertebra, subsequent encounter for fracture with routine healing (07/19/2019), Unspecified symptoms and signs involving the genitourinary system, and Urinary tract infection, site not specified.  - has a past surgical history that includes Other surgical history (07/01/2019); CT angio neck (1/24/2019); MR angio head wo IV contrast (8/13/2016); MR angio neck wo IV contrast (8/13/2016); and CT angio abdomen pelvis w and or wo IV IV contrast (12/21/2023).    Medications: Reviewed in EMR. See EMR for complete list of medications and doses.    Allergies:  Patient has no known allergies.    Social History:  - Tobacco:  reports that she quit smoking about 62 years ago. Her smoking use included cigarettes. She has a 10.00 pack-year smoking history. She has never used smokeless tobacco.   - Alcohol:  has no history on file for alcohol use.   - Illicit Drugs:  has no history on file for drug use.   ???????????????????????????????????????????????????????????????  Triage Vitals:  T 36.9 °C (98.4 °F)  HR 77  /78  RR 16  O2 96 % None (Room air)    PHYSICAL EXAM:   VS: As documented in the triage note and EMR flowsheet from this visit were reviewed.  Gen: Elderly female laying in bed, uncomfortable appearing  Eyes: PERRL, EOMI. Clear scerla.  HENT: NC/AT, Mucosal membranes dry.  Neck: Supple, no cervical LAD  Resp: Non-labored breathing on RA, CTAB, no wheezes or crackles  CV: RRR, nl S1, S2, no murmurs  Abd: Soft, non-distended, mild diffuse tenderness to palpation throughout, no rebound or guarding  Pelvis: Tenderness to palpation diffusely along the left hip, hematoma versus hernia in the left far lower quadrant   Ext: no LE edema, pulses full and equal  Skin: WWP. No systemic rashes or lesions.  Back: No CT or L-spine tenderness to palpation or step-off/deformities.  MSK: Limited range of motion over the left hip secondary to the pain, full range of motion at other joints without notable  swelling  Neuro:  AAOx3, speech fluent, MAEx4, no focal deficit  Psych: Maintains eye contact, Appropriate mood and affect  ???????????????????????????????????????????????????????????????      ED Labs/Imaging:   Labs Reviewed   COMPREHENSIVE METABOLIC PANEL - Abnormal       Result Value    Glucose 92      Sodium 133 (*)     Potassium 3.5      Chloride 101      Bicarbonate 25      Anion Gap 11      Urea Nitrogen 17      Creatinine 0.53      eGFR 87      Calcium 9.4      Albumin 3.2 (*)     Alkaline Phosphatase 134      Total Protein 5.7 (*)     AST 42 (*)     Bilirubin, Total 0.3      ALT 44     THROMBOELASTOGRAPH CLOTTING GLOBAL PROFILE - Abnormal    R (Reaction Time) K 4.0 (*)     K (Clot Kinetics) 0.8      ANGLE 79.0 (*)     MA (Max Amplitude) K 68.0      R (Reaction Time) KH 4.0 (*)     MA (Max Amplitude) RT 69.0      MA ( Godwin Amplitude) FF 36.0 (*)     FLEV 650 (*)    CBC - Abnormal    WBC 7.4      nRBC 0.0      RBC 3.46 (*)     Hemoglobin 11.8 (*)     Hematocrit 33.3 (*)     MCV 96      MCH 34.1 (*)     MCHC 35.4      RDW 13.3      Platelets 437     COAGULATION SCREEN - Normal    Protime 12.8      INR 1.1      aPTT 32      Narrative:     The APTT is no longer used for monitoring Unfractionated Heparin Therapy. For monitoring Heparin Therapy, use the Heparin Assay.   CBC   TYPE AND SCREEN     CT 3D reconstruction   Final Result   Acute nondisplaced fracture of the left acetabulum with associated   small pelvic sidewall/ hematoma.        Please see same day CTA abdomen pelvis for further details regarding   the soft tissue findings.        I personally reviewed the images/study and I agree with the findings   as stated by Denzel Fletcher MD. This study was interpreted at   [facility], [place], [place].        MACRO:   None        Signed by: Dacia Mann 12/21/2023 4:42 AM   Dictation workstation:   [id number]      CT pelvis wo IV contrast   Final Result   Acute  nondisplaced fracture of the left acetabulum with associated   small pelvic sidewall/ hematoma.        Please see same day CTA abdomen pelvis for further details regarding   the soft tissue findings.        I personally reviewed the images/study and I agree with the findings   as stated by Denzel Fletcher MD. This study was interpreted at   [place], [place].        MACRO:   None        Signed by: Dacia Mann 12/21/2023 4:42 AM   Dictation workstation:   [id number]      CT angio abdomen pelvis w and or wo IV IV contrast   Final Result   Small hematoma in the left pelvic sidewall and obturator musculature.   Mild subcutaneous contusion left posterolateral hip. No evidence of   active arterial hemorrhage.        Acute nondisplaced left acetabular fracture. The degree of osteopenia   somewhat limits evaluation, however, this is appears to be transverse   type fracture. Small left hip lipohemarthrosis.        Bilateral lower spigelian hernias containing bowel as described   above. No inflammatory changes in the hernia sac or bowel obstruction.        No evidence of abdominal aortic aneurysm or dissection. Multifocal   atherosclerosis of the abdominal aorta and its branches without   flow-limiting stenosis.        An 8 mm stone in the proximal pancreatic duct with diffuse upstream   dilatation of the duct measuring up to 11 mm in diameter. Moderate   left-greater-than-right hepatic lobe biliary duct dilatation.        I personally reviewed the images/study and I agree with the findings   as stated by Denzel Fletcher MD. This study was interpreted at   [place], [place].        MACRO:   None.        Signed by: Dacia Mann 12/21/2023 4:30 AM   Dictation workstation:   [id number]      CT thoracic spine wo IV contrast   Final Result   No acute fracture or traumatic subluxation of the thoracic or lumbar   spine.         Multilevel degenerative changes of the thoracolumbar spine as   described above.        I personally reviewed the images/study and I agree with the findings   as stated by Denzel Fletcher MD. This study was interpreted at   [place], [place].        MACRO:   None        Signed by: Dacia Mann 12/21/2023 4:50 AM   Dictation workstation:   [id number]      CT lumbar spine wo IV contrast   Final Result   No acute fracture or traumatic subluxation of the thoracic or lumbar   spine.        Multilevel degenerative changes of the thoracolumbar spine as   described above.        I personally reviewed the images/study and I agree with the findings   as stated by Denzel Fletcher MD. This study was interpreted at   [place], [place].        MACRO:   None        Signed by: Dacia Mann 12/21/2023 4:50 AM   Dictation workstation:   [id number]      XR knee left 4+ views    (Results Pending)   XR tibia fibula left 2 views    (Results Pending)   XR ankle left 3+ views    (Results Pending)   XR pelvis 3+ views    (Results Pending)   XR chest 1 view    (Results Pending)         ED Course & MDM   ED Course as of 12/21/23 2249   Thu Dec 21, 2023   0510 I independently interpreted the EKG:  Regular rate. Regular rhythm. Normal axis.   Prolonged IL interval.  Normal QRS and QTc intervals.  No ST segment elevations, depressions, or T wave inversions.  Impression: Sinus rhythm with first-degree AV block, no STEMI. [KR]      ED Course User Index  [KR] aJckelyn Ohara MD         Diagnoses as of 12/21/23 2249   Closed nondisplaced fracture of left acetabulum, unspecified portion of acetabulum, initial encounter (CMS/[facility])   Fall, initial encounter           Medical Decision Making:  This is a 89-year-old female with above-stated history presenting to the ED as a transfer for trauma and orthopedic surgery consultation in the setting of a  ground-level fall and acetabular fracture with concern for hematoma.  Patient arrives hemodynamically stable and not in acute distress.  Patient uncomfortable appearing secondary to the left hip pain, is neurovascularly intact.  Patient has stable blood pressure and stable hematoma.  Patient has palpable hernia plus or minus hematoma in the left lower quadrant.  CT imaging reviewed from outside hospital which radiology read is concerning for obturator hematoma.  CTA was obtained on arrival and showed no signs of hematoma or active extravasation.  No need for anticoagulation reversal at this time.  Additionally based on exam and imaging no concern for hernia incarceration or strangulation.  Trauma surgery and orthopedic surgery were consulted for acetabular fracture.  Initial recommendations from orthopedic surgery are that this is a nonoperative injury.  Long discussion at bedside with patient's daughter who is primary decision maker.  Daughter does not feel safe with patient returning to that SNF.  Patient required admission to the trauma team, under observation status for further pain management, orthopedic recommendations, and PT OT.  Agreeable to plan patient jennifer in stable condition.    Social Determinants Limiting Care:  None identified    Disposition:  As a result of their workup, the patient will require admission to the hospital.  The patient was informed of their diagnosis.  Patient was given the opportunity to ask questions and answered them.  Patient agreed to be admitted to the hospital.    Patient seen and discussed with attending physician.    Jackelyn Ohara MD PGY3  Emergency Medicine      Procedures ? SmartLinks last updated 12/21/2023 4:56 AM        Jackelyn Ohara MD  Resident  12/21/23 1697

## 2023-12-22 LAB
ALBUMIN SERPL BCP-MCNC: 3 G/DL (ref 3.4–5)
ANION GAP SERPL CALC-SCNC: 15 MMOL/L (ref 10–20)
BUN SERPL-MCNC: 13 MG/DL (ref 6–23)
CALCIUM SERPL-MCNC: 9.3 MG/DL (ref 8.6–10.6)
CHLORIDE SERPL-SCNC: 101 MMOL/L (ref 98–107)
CO2 SERPL-SCNC: 23 MMOL/L (ref 21–32)
CREAT SERPL-MCNC: 0.62 MG/DL (ref 0.5–1.05)
ERYTHROCYTE [DISTWIDTH] IN BLOOD BY AUTOMATED COUNT: 13.5 % (ref 11.5–14.5)
GFR SERPL CREATININE-BSD FRML MDRD: 84 ML/MIN/1.73M*2
GLUCOSE SERPL-MCNC: 74 MG/DL (ref 74–99)
HCT VFR BLD AUTO: 36.7 % (ref 36–46)
HGB BLD-MCNC: 11.8 G/DL (ref 12–16)
MAGNESIUM SERPL-MCNC: 1.93 MG/DL (ref 1.6–2.4)
MCH RBC QN AUTO: 33.8 PG (ref 26–34)
MCHC RBC AUTO-ENTMCNC: 32.2 G/DL (ref 32–36)
MCV RBC AUTO: 105 FL (ref 80–100)
NRBC BLD-RTO: 0 /100 WBCS (ref 0–0)
PHOSPHATE SERPL-MCNC: 3.3 MG/DL (ref 2.5–4.9)
PLATELET # BLD AUTO: 445 X10*3/UL (ref 150–450)
POTASSIUM SERPL-SCNC: 4.4 MMOL/L (ref 3.5–5.3)
RBC # BLD AUTO: 3.49 X10*6/UL (ref 4–5.2)
SODIUM SERPL-SCNC: 135 MMOL/L (ref 136–145)
WBC # BLD AUTO: 7.5 X10*3/UL (ref 4.4–11.3)

## 2023-12-22 PROCEDURE — 83735 ASSAY OF MAGNESIUM: CPT | Performed by: STUDENT IN AN ORGANIZED HEALTH CARE EDUCATION/TRAINING PROGRAM

## 2023-12-22 PROCEDURE — 36415 COLL VENOUS BLD VENIPUNCTURE: CPT | Performed by: STUDENT IN AN ORGANIZED HEALTH CARE EDUCATION/TRAINING PROGRAM

## 2023-12-22 PROCEDURE — 97530 THERAPEUTIC ACTIVITIES: CPT | Mod: GP

## 2023-12-22 PROCEDURE — G0378 HOSPITAL OBSERVATION PER HR: HCPCS

## 2023-12-22 PROCEDURE — 2500000004 HC RX 250 GENERAL PHARMACY W/ HCPCS (ALT 636 FOR OP/ED)

## 2023-12-22 PROCEDURE — 96375 TX/PRO/DX INJ NEW DRUG ADDON: CPT

## 2023-12-22 PROCEDURE — 2500000001 HC RX 250 WO HCPCS SELF ADMINISTERED DRUGS (ALT 637 FOR MEDICARE OP): Performed by: STUDENT IN AN ORGANIZED HEALTH CARE EDUCATION/TRAINING PROGRAM

## 2023-12-22 PROCEDURE — 85027 COMPLETE CBC AUTOMATED: CPT | Performed by: STUDENT IN AN ORGANIZED HEALTH CARE EDUCATION/TRAINING PROGRAM

## 2023-12-22 PROCEDURE — 80069 RENAL FUNCTION PANEL: CPT | Performed by: STUDENT IN AN ORGANIZED HEALTH CARE EDUCATION/TRAINING PROGRAM

## 2023-12-22 PROCEDURE — 97110 THERAPEUTIC EXERCISES: CPT | Mod: GP

## 2023-12-22 PROCEDURE — 2500000004 HC RX 250 GENERAL PHARMACY W/ HCPCS (ALT 636 FOR OP/ED): Performed by: STUDENT IN AN ORGANIZED HEALTH CARE EDUCATION/TRAINING PROGRAM

## 2023-12-22 PROCEDURE — 2500000001 HC RX 250 WO HCPCS SELF ADMINISTERED DRUGS (ALT 637 FOR MEDICARE OP)

## 2023-12-22 RX ORDER — BISACODYL 5 MG
10 TABLET, DELAYED RELEASE (ENTERIC COATED) ORAL ONCE
Status: COMPLETED | OUTPATIENT
Start: 2023-12-22 | End: 2023-12-22

## 2023-12-22 RX ORDER — FUROSEMIDE 20 MG/1
20 TABLET ORAL DAILY
Status: DISCONTINUED | OUTPATIENT
Start: 2023-12-22 | End: 2023-12-23 | Stop reason: HOSPADM

## 2023-12-22 RX ADMIN — CEFTRIAXONE SODIUM 1 G: 1 INJECTION, SOLUTION INTRAVENOUS at 00:11

## 2023-12-22 RX ADMIN — APIXABAN 5 MG: 5 TABLET, FILM COATED ORAL at 20:42

## 2023-12-22 RX ADMIN — OXYCODONE HYDROCHLORIDE 2.5 MG: 5 TABLET ORAL at 16:03

## 2023-12-22 RX ADMIN — SENNOSIDES 17.2 MG: 8.6 TABLET, FILM COATED ORAL at 09:05

## 2023-12-22 RX ADMIN — OXYCODONE HYDROCHLORIDE 5 MG: 5 TABLET ORAL at 09:05

## 2023-12-22 RX ADMIN — POLYETHYLENE GLYCOL 3350 17 G: 17 POWDER, FOR SOLUTION ORAL at 09:05

## 2023-12-22 RX ADMIN — ACETAMINOPHEN 650 MG: 325 TABLET ORAL at 09:05

## 2023-12-22 RX ADMIN — SENNOSIDES 17.2 MG: 8.6 TABLET, FILM COATED ORAL at 20:42

## 2023-12-22 RX ADMIN — APIXABAN 5 MG: 5 TABLET, FILM COATED ORAL at 00:11

## 2023-12-22 RX ADMIN — BISACODYL 10 MG: 5 TABLET, COATED ORAL at 16:04

## 2023-12-22 RX ADMIN — APIXABAN 5 MG: 5 TABLET, FILM COATED ORAL at 09:05

## 2023-12-22 RX ADMIN — ACETAMINOPHEN 650 MG: 325 TABLET ORAL at 20:42

## 2023-12-22 RX ADMIN — ACETAMINOPHEN 650 MG: 325 TABLET ORAL at 16:03

## 2023-12-22 RX ADMIN — FUROSEMIDE 20 MG: 20 TABLET ORAL at 09:05

## 2023-12-22 ASSESSMENT — PAIN - FUNCTIONAL ASSESSMENT
PAIN_FUNCTIONAL_ASSESSMENT: 0-10

## 2023-12-22 ASSESSMENT — PAIN SCALES - GENERAL
PAINLEVEL_OUTOF10: 7
PAINLEVEL_OUTOF10: 8
PAINLEVEL_OUTOF10: 9
PAINLEVEL_OUTOF10: 9
PAINLEVEL_OUTOF10: 6
PAINLEVEL_OUTOF10: 9

## 2023-12-22 ASSESSMENT — COGNITIVE AND FUNCTIONAL STATUS - GENERAL
MOVING FROM LYING ON BACK TO SITTING ON SIDE OF FLAT BED WITH BEDRAILS: A LOT
STANDING UP FROM CHAIR USING ARMS: A LOT
WALKING IN HOSPITAL ROOM: TOTAL
TURNING FROM BACK TO SIDE WHILE IN FLAT BAD: A LOT
MOVING TO AND FROM BED TO CHAIR: TOTAL
CLIMB 3 TO 5 STEPS WITH RAILING: TOTAL
MOBILITY SCORE: 9

## 2023-12-22 NOTE — PROGRESS NOTES
Physical Therapy    Physical Therapy Treatment    Patient Name: Cassia Humphries  MRN: [id number]  Today's Date: 12/22/2023  Time Calculation  Start Time: 1029  Stop Time: 1131  Time Calculation (min): 62 min       Assessment/Plan   PT Assessment  PT Assessment Results: Decreased strength, Decreased range of motion, Decreased endurance, Impaired balance, Decreased mobility, Impaired hearing, Pain  Rehab Prognosis: Good  Barriers to Discharge: none  Evaluation/Treatment Tolerance: Patient limited by fatigue, Patient limited by pain  Medical Staff Made Aware: Yes  Strengths: Attitude of self  Barriers to Participation:  (none)  End of Session Communication: Bedside nurse  Assessment Comment: The pt demonstrated unsafe mobility due increased pain, decreased strength, decreased ROM, and impaired balance.  End of Session Patient Position: Bed, 3 rail up, Alarm off, caregiver present  PT Plan  Inpatient/Swing Bed or Outpatient: Inpatient  PT Plan  Treatment/Interventions: Bed mobility, Transfer training, Gait training, Balance training, Strengthening, Endurance training, Range of motion, Therapeutic exercise, Therapeutic activity, Home exercise program  PT Plan: Skilled PT  PT Frequency: 5 times per week  PT Discharge Recommendations: Moderate intensity level of continued care  Equipment Recommended upon Discharge:  (none)  PT Recommended Transfer Status: Assist x2  PT - OK to Discharge: Yes      General Visit Information:   PT  Visit  PT Received On: 12/22/23  Response to Previous Treatment: Patient reporting fatigue but able to participate.  General  Family/Caregiver Present: Yes  Caregiver Feedback: Daughter present with support.  Prior to Session Communication: Bedside nurse  Patient Position Received: Bed, 3 rail up, Alarm off, caregiver present  Preferred Learning Style: verbal, visual  General Comment: The pt was pleasant, cooperative and willing to participate in therapy. The pt is very pain-limited.    Subjective    Precautions:  Precautions  LE Weight Bearing Status: Weight Bearing as Tolerated (Dimas LE)  Medical Precautions: Fall precautions  Precautions Comment: Pt in compliance with precautions throughout PT session.     Objective   Pain:  Pain Assessment  Pain Assessment: 0-10  Pain Score: 9  Pain Type: Acute pain  Pain Location: Abdomen  Pain Orientation: Right, Lower  Pain Interventions: Heat applied  Response to Interventions: improvement  Multiple Pain Sites: Three  Pain 2  Pain Score 2: 9  Pain Type 2: Acute pain  Pain Location 2: Hip  Pain Orientation 2: Left  Pain 3  Pain Score 3: 9  Pain Type 3: Acute pain  Pain Location 3: Leg  Pain Orientation 3: Right, Left  Cognition:  Cognition  Overall Cognitive Status: Within Functional Limits  Postural Control:  Postural Control  Postural Control: Within Functional Limits  Extremity/Trunk Assessments:        RUE   RUE : Exceptions to L  RUE AROM (degrees)  RUE AROM Comment: decreased shoulder flexion  RUE Strength  R Shoulder Flexion: 3-/5  R Elbow Flexion: 3+/5  R Elbow Extension: 3+/5  R Wrist Flexion: 4-/5  R Wrist Extension: 4-/5  LUE   LUE: Exceptions to [facility]  LUE AROM (degrees)  LUE AROM Comment: decreased shoulder flexion  LUE Strength  L Shoulder Flexion: 3-/5  L Elbow Flexion: 3/5  L Elbow Extension: 3/5  L Wrist Flexion: 3+/5  L Wrist Extension: 3+/5  RLE   RLE : Exceptions to WFL  AROM RLE (degrees)  RLE AROM Comment: decreased hip and knee flexion  Strength RLE  R Hip Flexion: 3-/5  R Knee Flexion: 3-/5  R Knee Extension: 3-/5  R Ankle Dorsiflexion: 3/5  R Ankle Plantar Flexion: 3/5  LLE   LLE : Exceptions to L  AROM LLE (degrees)  LLE AROM Comment: decreased hip and knee flexion  Strength LLE  L Hip Flexion: 3-/5  L Knee Flexion: 3/5  L Knee Extension: 3/5  L Ankle Dorsiflexion: 3+/5  L Ankle Plantar Flexion: 3+/5  Activity Tolerance:  Activity Tolerance  Endurance: Decreased tolerance for upright activites  Treatments:  Therapeutic Exercise  Therapeutic  Exercise Performed: Yes  Therapeutic Exercise Activity 1: ankle pumps x 15  Therapeutic Exercise Activity 2: heel slides x 15  Therapeutic Exercise Activity 3: SAQ x 15  Therapeutic Exercise Activity 4: SLR x 15  Therapeutic Exercise Activity 5: Hip ABD x 15    Balance/Neuromuscular Re-Education  Balance/Neuromuscular Re-Education Activity Performed: Yes  Balance/Neuromuscular Re-Education Activity 1: SBA static sitting balance using Dimas UE and LE support  Balance/Neuromuscular Re-Education Activity 2: SBA dynamic sitting balance using Dimas UE and LE support  Balance/Neuromuscular Re-Education Activity 3: max assist static standing balance using face-to-face HHA  Balance/Neuromuscular Re-Education Activity 4: max assist dynamic standing balance using face-to-face HHA    Bed Mobility  Bed Mobility: Yes  Bed Mobility 1  Bed Mobility 1: Supine to sitting  Level of Assistance 1: Dependent  Bed Mobility Comments 1: HOB elevated  Bed Mobility 2  Bed Mobility  2: Sitting to supine  Level of Assistance 2: Dependent (x 2)  Bed Mobility Comments 2: HOB slightly elevated    Transfers  Transfer: Yes  Transfer 1  Transfer From 1: Sit to  Transfer to 1: Stand  Transfer Level of Assistance 1: Maximum assistance (face-to-face HHA)  Transfers 2  Transfer From 2: Stand to  Transfer to 2: Sit  Transfer Level of Assistance 2: Maximum assistance (face-to-face HHA)    Outcome Measures:  [facility] Basic Mobility  Turning from your back to your side while in a flat bed without using bedrails: A lot  Moving from lying on your back to sitting on the side of a flat bed without using bedrails: A lot  Moving to and from bed to chair (including a wheelchair): Total  Standing up from a chair using your arms (e.g. wheelchair or bedside chair): A lot  To walk in hospital room: Total  Climbing 3-5 steps with railing: Total  Basic Mobility - Total Score: 9    Education Documentation  Precautions, taught by Alan Jackson, PT at 12/22/2023 11:54  AM.  Learner: Family, Patient  Readiness: Acceptance  Method: Explanation, Demonstration  Response: Verbalizes Understanding    Body Mechanics, taught by Alan Jackson, PT at 12/22/2023 11:54 AM.  Learner: Family, Patient  Readiness: Acceptance  Method: Explanation, Demonstration  Response: Verbalizes Understanding    Home Exercise Program, taught by Alan Jackson, PT at 12/22/2023 11:54 AM.  Learner: Family, Patient  Readiness: Acceptance  Method: Explanation, Demonstration  Response: Verbalizes Understanding    Mobility Training, taught by Alan Jackson, PT at 12/22/2023 11:54 AM.  Learner: Family, Patient  Readiness: Acceptance  Method: Explanation, Demonstration  Response: Verbalizes Understanding    Education Comments  No comments found.        OP EDUCATION:  Outpatient Education  Individual(s) Educated: Patient, Child  Education Provided: Body Mechanics, Fall Risk, Home Exercise Program  Patient Response to Education: Patient/Caregiver Verbalized Understanding of Information    Encounter Problems       Encounter Problems (Active)       Balance       STG - Maintains static sitting balance with upper extremity support with CGA for 5 mins  (Met)       Start:  12/21/23    Expected End:  01/04/24    Resolved:  12/22/23    Updated to: STG - Maintains static sitting balance with upper extremity support with supervision assistance for 5 mins    Update reason: Pt demonstrated functional progression.    INTERVENTIONS:  1. Practice sitting on the edge of a bed/mat with minimal support.  2. Educate patient about maintining total hip precautions while maintaining balance.  3. Educate patient about pressure relief.  4. Educate patient about use of assistive device.         STG - Maintains static sitting balance with upper extremity support with supervision assistance for 5 mins (Progressing)       Start:  12/22/23    Expected End:  01/04/24       INTERVENTIONS:  1. Practice sitting on the edge of a bed/mat with  minimal support.  2. Educate patient about maintining total hip precautions while maintaining balance.  3. Educate patient about pressure relief.  4. Educate patient about use of assistive device.            Mobility       STG - Patient will ambulate 10 ft with ww and min assist  (Progressing)       Start:  12/21/23    Expected End:  01/04/24               Transfers       STG - Patient will perform bed mobility with min assist  (Progressing)       Start:  12/21/23    Expected End:  01/04/24            STG - Patient will transfer sit to and from stand with min assist  (Progressing)       Start:  12/21/23    Expected End:  01/04/24

## 2023-12-22 NOTE — HOSPITAL COURSE
Patient is a 89 year old female who was transferred from [facility] to [facility] on 12/21 for trauma evaluation after a ground level fall at a SNF. Patient was getting up off the toilet and were trying to pull their pants up when they fell on their left side. Patient initially was taken to [facility] where she was CT pan scanned revealing a left nondisplaced acetabular fracture. When patient arrived at [facility], a heamtoma was noted on physical exam and was further evaluated with a CTA of the pelvis. CTA pelvis showed no active extravasation and home eliquis was held to prevent further bleeding. Ortho was consulted recommending no surgical intervention and that patient can bear weight on affected extremity as tolerated. Due to patient falling because patient did not receive help from nursing staff at SNF, patient's daughter did not feel safe sending her mother back to prior SNF. To determine further placement of patient, patient was admitted on 12/21 under observation. Patient's hospital course was uncomplicated. Patient had been undergoing treatment for a UTI that was present on admission. Antibiotic regimen continued inpatient. Patient's hold eliquis was restarted on 12/22. Patient tolerated a regular diet and had their pain adequately controlled. She did complain of abdominal pain, similar to prior (outpatient) episodes. Patient was passing gas and xray of the abdomen showed no acute process. Patient was evaluated by PT recommending moderate intensity. Patient was with appropriate scripts and follow ups.     Updated 12/22

## 2023-12-22 NOTE — CARE PLAN
Problem: Pain - Adult  Goal: Verbalizes/displays adequate comfort level or baseline comfort level  Outcome: Progressing     Problem: Safety - Adult  Goal: Free from fall injury  Outcome: Progressing     Problem: Discharge Planning  Goal: Discharge to home or other facility with appropriate resources  Outcome: Progressing     Problem: Chronic Conditions and Co-morbidities  Goal: Patient's chronic conditions and co-morbidity symptoms are monitored and maintained or improved  Outcome: Progressing     Problem: Skin  Goal: Decreased wound size/increased tissue granulation at next dressing change  Outcome: Progressing  Goal: Participates in plan/prevention/treatment measures  Outcome: Progressing  Goal: Prevent/manage excess moisture  Outcome: Progressing  Goal: Prevent/minimize sheer/friction injuries  Outcome: Progressing  Goal: Promote/optimize nutrition  Outcome: Progressing  Goal: Promote skin healing  Outcome: Progressing     Problem: Fall/Injury  Goal: Not fall by end of shift  Outcome: Progressing  Goal: Be free from injury by end of the shift  Outcome: Progressing  Goal: Verbalize understanding of personal risk factors for fall in the hospital  Outcome: Progressing  Goal: Verbalize understanding of risk factor reduction measures to prevent injury from fall in the home  Outcome: Progressing  Goal: Use assistive devices by end of the shift  Outcome: Progressing  Goal: Pace activities to prevent fatigue by end of the shift  Outcome: Progressing     Problem: Pain  Goal: Takes deep breaths with improved pain control throughout the shift  Outcome: Progressing  Goal: Turns in bed with improved pain control throughout the shift  Outcome: Progressing  Goal: Walks with improved pain control throughout the shift  Outcome: Progressing  Goal: Performs ADL's with improved pain control throughout shift  Outcome: Progressing  Goal: Participates in PT with improved pain control throughout the shift  Outcome: Progressing  Goal:  Free from opioid side effects throughout the shift  Outcome: Progressing  Goal: Free from acute confusion related to pain meds throughout the shift  Outcome: Progressing   The patient's goals for the shift include      The clinical goals for the shift include patient will remain absent of new injury by the end of the shift

## 2023-12-22 NOTE — PROGRESS NOTES
[facility]  TRAUMA SERVICE - PROGRESS NOTE    Patient Name: Cassia Humphries  MRN: [id number]  Admit Date: 1221  : 1932  AGE: 89 y.o.   GENDER: female  ==============================================================================  MECHANISM OF INJURY:   Ground-level fall at SNF    LOC (yes/no?): No  Anticoagulant / Anti-platelet Rx? (for what dx?): SAM wilkinson  Referring Facility Name (N/A for scene EMR run): [place]     INJURIES:   Left nondisplaced acute acetabular fracture     OTHER MEDICAL PROBLEMS:  PE  Afib  Anemia  HTN  Osteoarthritis  UTI      ==============================================================================  UPDATED PLAN OF CARE:  89 yo F who sustained a ground level fall at SNF.  found to have a L incomplete acetabular fracture. On Eliquis for prior PE and Afib.     #Incomplete acetabular fracture  -Per Ortho, no indication for intervention  -Weightbearing as tolerated bilateral extremities  -Scheduled tylenol, prn ibuprofen, prn oxycodone 2.5/5  -SCDs  -PT/OT   -patient will return to [facility] which patient originally came from    #Afib  #Hx PE  -On home Eliquis 5 mg twice daily    #HTN  -Lasix as needed    #UTI  -Patient with previously known uncomplicated UTI prior to admission  -Patient has completed 9 days of cefuroxime  -Patient given 1 day worth of ceftriaxone to complete course    # Constipation, chronic   -Continue with bowel regimen including MiraLAX, Dulcolax, senna      Dispo: Patient medically cleared for discharge. Patient accepted back to [facility] where she previously resided.  Social work coordinating discharge and patient was accepted.  Plan for discharge on 2023.    ==============================================================================  CHIEF COMPLAINT / OVERNIGHT EVENTS:   No acute events overnight.  The patient's pain is well-controlled.  She has been having regular bowel movements and has an appetite.      MEDICAL  HISTORY / ROS:  Admission history and ROS reviewed. Pertinent changes as follows:  - pain better controlled  - no nausea    PHYSICAL EXAM:  Heart Rate:  [64-76]   Temp:  [35.7 °C (96.3 °F)-36.2 °C (97.2 °F)]   Resp:  [16-18]   BP: (135-188)/(78-98)   SpO2:  [96 %-98 %]     Gen:  NAD, non-toxic appearing  HEENT:  Atraumatic, normocephalic  Eyes:  No scleral icterus  Card:  RRR  Resp:  Non-labored breathing on RA  Abd:  Moderately tender to palpation in bilateral lower abdominal quadrants, suprapubic.  Nontympanic, nondistended.  Notable reducible bilateral hernias. No skin changes, hematoma formation or signs of active bleeding or drainage.    Ext:  WWP, no swelling of BLE; L hip tenderness to palpation.  No hematoma formation or skin changes.  Skin:  Warm and dry  Neuro:  AOx3, no gross neurological deficits  Psych:  Appropriate mood and affect      IMAGING SUMMARY:  (summary of new imaging findings, not a copy of dictation)  CTA Pelvis: no active extravasation; small L pelvic hematoma.    LABS:  Results from last 7 days   Lab Units 12/22/23  0626 12/21/23  0734 12/21/23  0214   WBC AUTO x10*3/uL 7.5 8.1 7.4   HEMOGLOBIN g/dL 11.8* 12.3 11.8*   HEMATOCRIT % 36.7 35.0* 33.3*   PLATELETS AUTO x10*3/uL 445 427 437     Results from last 7 days   Lab Units 12/21/23  0210   APTT seconds 32   INR  1.1     Results from last 7 days   Lab Units 12/22/23  0626 12/21/23  0210   SODIUM mmol/L 135* 133*   POTASSIUM mmol/L 4.4 3.5   CHLORIDE mmol/L 101 101   CO2 mmol/L 23 25   BUN mg/dL 13 17   CREATININE mg/dL 0.62 0.53   CALCIUM mg/dL 9.3 9.4   PROTEIN TOTAL g/dL  --  5.7*   BILIRUBIN TOTAL mg/dL  --  0.3   ALK PHOS U/L  --  134   ALT U/L  --  44   AST U/L  --  42*   GLUCOSE mg/dL 74 92     Results from last 7 days   Lab Units 12/21/23  0210   BILIRUBIN TOTAL mg/dL 0.3           I have reviewed all medications, laboratory results, and imaging pertinent for today's encounter.    Pt seen and d/w Dr. Vanna Dawn,  DO  PGY-1  Trauma Surgery [id number]

## 2023-12-23 ENCOUNTER — APPOINTMENT (OUTPATIENT)
Dept: RADIOLOGY | Facility: HOSPITAL | Age: 88
End: 2023-12-23
Payer: MEDICARE

## 2023-12-23 VITALS
SYSTOLIC BLOOD PRESSURE: 149 MMHG | OXYGEN SATURATION: 96 % | TEMPERATURE: 98.2 F | DIASTOLIC BLOOD PRESSURE: 94 MMHG | RESPIRATION RATE: 15 BRPM | HEART RATE: 58 BPM

## 2023-12-23 PROCEDURE — 2500000001 HC RX 250 WO HCPCS SELF ADMINISTERED DRUGS (ALT 637 FOR MEDICARE OP): Performed by: STUDENT IN AN ORGANIZED HEALTH CARE EDUCATION/TRAINING PROGRAM

## 2023-12-23 PROCEDURE — 74022 RADEX COMPL AQT ABD SERIES: CPT

## 2023-12-23 PROCEDURE — 2500000004 HC RX 250 GENERAL PHARMACY W/ HCPCS (ALT 636 FOR OP/ED): Performed by: STUDENT IN AN ORGANIZED HEALTH CARE EDUCATION/TRAINING PROGRAM

## 2023-12-23 PROCEDURE — G0378 HOSPITAL OBSERVATION PER HR: HCPCS

## 2023-12-23 PROCEDURE — 99238 HOSP IP/OBS DSCHRG MGMT 30/<: CPT | Performed by: SURGERY

## 2023-12-23 RX ORDER — ACETAMINOPHEN 325 MG/1
975 TABLET ORAL EVERY 8 HOURS PRN
Qty: 30 TABLET | Refills: 0
Start: 2023-12-23 | End: 2024-03-08 | Stop reason: ALTCHOICE

## 2023-12-23 RX ORDER — POLYETHYLENE GLYCOL 3350 17 G/17G
17 POWDER, FOR SOLUTION ORAL DAILY
Refills: 0
Start: 2023-12-24 | End: 2024-02-14 | Stop reason: ALTCHOICE

## 2023-12-23 RX ORDER — POLYETHYLENE GLYCOL 3350 17 G/17G
17 POWDER, FOR SOLUTION ORAL ONCE
Status: DISCONTINUED | OUTPATIENT
Start: 2023-12-23 | End: 2023-12-23 | Stop reason: HOSPADM

## 2023-12-23 RX ORDER — SENNOSIDES 8.6 MG/1
2 TABLET ORAL 2 TIMES DAILY
Start: 2023-12-23 | End: 2024-02-14 | Stop reason: ALTCHOICE

## 2023-12-23 RX ORDER — ACETAMINOPHEN 325 MG/1
975 TABLET ORAL EVERY 8 HOURS PRN
Status: DISCONTINUED | OUTPATIENT
Start: 2023-12-23 | End: 2023-12-23 | Stop reason: HOSPADM

## 2023-12-23 RX ORDER — OXYCODONE HYDROCHLORIDE 5 MG/1
2.5 TABLET ORAL EVERY 6 HOURS PRN
Qty: 15 TABLET | Refills: 0 | Status: SHIPPED | OUTPATIENT
Start: 2023-12-23 | End: 2024-02-14 | Stop reason: ALTCHOICE

## 2023-12-23 RX ADMIN — SENNOSIDES 17.2 MG: 8.6 TABLET, FILM COATED ORAL at 09:14

## 2023-12-23 RX ADMIN — APIXABAN 5 MG: 5 TABLET, FILM COATED ORAL at 09:14

## 2023-12-23 RX ADMIN — OXYCODONE HYDROCHLORIDE 5 MG: 5 TABLET ORAL at 05:06

## 2023-12-23 RX ADMIN — FUROSEMIDE 20 MG: 20 TABLET ORAL at 09:14

## 2023-12-23 RX ADMIN — OXYCODONE HYDROCHLORIDE 5 MG: 5 TABLET ORAL at 00:08

## 2023-12-23 RX ADMIN — POLYETHYLENE GLYCOL 3350 17 G: 17 POWDER, FOR SOLUTION ORAL at 09:14

## 2023-12-23 RX ADMIN — OXYCODONE HYDROCHLORIDE 5 MG: 5 TABLET ORAL at 14:36

## 2023-12-23 RX ADMIN — ACETAMINOPHEN 650 MG: 325 TABLET ORAL at 09:14

## 2023-12-23 ASSESSMENT — COGNITIVE AND FUNCTIONAL STATUS - GENERAL
WALKING IN HOSPITAL ROOM: A LOT
MOVING FROM LYING ON BACK TO SITTING ON SIDE OF FLAT BED WITH BEDRAILS: A LOT
TURNING FROM BACK TO SIDE WHILE IN FLAT BAD: A LOT
STANDING UP FROM CHAIR USING ARMS: A LOT
MOVING TO AND FROM BED TO CHAIR: A LOT
MOBILITY SCORE: 12
CLIMB 3 TO 5 STEPS WITH RAILING: A LOT

## 2023-12-23 ASSESSMENT — PAIN DESCRIPTION - LOCATION
LOCATION: LEG
LOCATION: HIP

## 2023-12-23 ASSESSMENT — PAIN DESCRIPTION - ORIENTATION
ORIENTATION: RIGHT;LEFT;LOWER
ORIENTATION: RIGHT;LEFT

## 2023-12-23 ASSESSMENT — PAIN SCALES - GENERAL
PAINLEVEL_OUTOF10: 8
PAINLEVEL_OUTOF10: 5 - MODERATE PAIN
PAINLEVEL_OUTOF10: 8
PAINLEVEL_OUTOF10: 6

## 2023-12-23 ASSESSMENT — PAIN - FUNCTIONAL ASSESSMENT
PAIN_FUNCTIONAL_ASSESSMENT: 0-10

## 2023-12-23 NOTE — NURSING NOTE
Nurse to nurse report given to Simin at the [facility], [place]. Patient picked up for transport by two ambulance personal by [facility]. Patient medicated with prn oxycodone for 50 minute ride. Patient discharged at this time.

## 2023-12-23 NOTE — CARE PLAN
Problem: Balance  Goal: STG - Maintains static sitting balance with upper extremity support with supervision assistance for 5 mins  Outcome: Adequate for Discharge   The patient's goals for the shift include      The clinical goals for the shift include Safaety    Over the shift, the patient did not make progress toward the following goals. Barriers to progression include . Recommendations to address these barriers include .

## 2023-12-23 NOTE — CARE PLAN
Problem: Pain - Adult  Goal: Verbalizes/displays adequate comfort level or baseline comfort level  Outcome: Progressing   The patient's goals for the shift include      The clinical goals for the shift include patient will state is at tolerable level    Over the shift, the patient did not make progress toward the following goals. Barriers to progression include . Recommendations to address these barriers include .

## 2023-12-23 NOTE — PROGRESS NOTES
Occupational Therapy                 Therapy Communication Note    Patient Name: Cassia Humphries  MRN: [id number]  Today's Date: 12/23/2023     Discipline: Occupational Therapy    Missed Visit Reason: Missed Visit Reason:  (Pt off the floor at x-ray, will re-attempt as schedule permits)    Missed Time: Attempt

## 2023-12-29 ENCOUNTER — TELEPHONE (OUTPATIENT)
Dept: PRIMARY CARE | Facility: CLINIC | Age: 88
End: 2023-12-29
Payer: MEDICARE

## 2023-12-29 NOTE — TELEPHONE ENCOUNTER
Patients daughter nathan (on HIPAA) called regarding sheldon.  She was treated and released to a skilled nursing facility where she fell and has a hip fracture she was started on oxycodone for the hip paint and it is making her bowels worse. She was then released back to that same facility and the  There is suggesting all sorts of things she would like you to give her (daughter) a call so she can get some clarification/direction and update you on everything going on.   PH: [phone]

## 2023-12-30 NOTE — DISCHARGE SUMMARY
Discharge Diagnosis  Closed posterior wall acetabular fx, left, initial encounter (CMS/[facility])    Issues Requiring Follow-Up  Acetabular fracture    Test Results Pending At Discharge  Pending Labs       No current pending labs.            Hospital Course  Patient is a 89 year old female who was transferred from [facility] to [facility] on 12/21 for trauma evaluation after a ground level fall at a SNF. Patient was getting up off the toilet and were trying to pull their pants up when they fell on their left side. Patient initially was taken to [facility] where she was CT pan scanned revealing a left nondisplaced acetabular fracture. When patient arrived at [facility], a heamtoma was noted on physical exam and was further evaluated with a CTA of the pelvis. CTA pelvis showed no active extravasation and home eliquis was held to prevent further bleeding. Ortho was consulted recommending no surgical intervention and that patient can bear weight on affected extremity as tolerated. Due to patient falling because patient did not receive help from nursing staff at SNF, patient's daughter did not feel safe sending her mother back to prior SNF. To determine further placement of patient, patient was admitted on 12/21 under observation. Patient's hospital course was uncomplicated. Patient had been undergoing treatment for a UTI that was present on admission. Antibiotic regimen continued inpatient. Patient's hold eliquis was restarted on 12/22. Patient tolerated a regular diet and had their pain adequately controlled. She did complain of abdominal pain, similar to prior (outpatient) episodes. Patient was passing gas and xray of the abdomen showed no acute process. Patient was evaluated by PT recommending moderate intensity. Patient was with appropriate scripts and follow ups.     Updated 12/22 and 12/29    Pertinent Physical Exam At Time of Discharge  Physical Exam  Alert   Still with abdominal pain. Patient has this at times. Abdomen  nondistended. Tender RLQ at hernia site. Patient tolerating diet and passing gas. Xray with no acute findings.     Home Medications     Medication List      START taking these medications     oxyCODONE 5 mg immediate release tablet; Commonly known as: Roxicodone;   Take 0.5 tablets (2.5 mg) by mouth every 6 hours if needed for moderate   pain (4 - 6).   polyethylene glycol 17 gram packet; Commonly known as: Glycolax,   Miralax; Take 17 g by mouth once daily. Do not start before December 24, 2023.   sennosides 8.6 mg tablet; Commonly known as: Senokot; Take 2 tablets   (17.2 mg) by mouth 2 times a day.     CHANGE how you take these medications     acetaminophen 325 mg tablet; Commonly known as: Tylenol; Take 3 tablets   (975 mg) by mouth every 8 hours if needed for moderate pain (4 - 6).; What   changed: how much to take, when to take this, reasons to take this,   Another medication with the same name was removed. Continue taking this   medication, and follow the directions you see here.     CONTINUE taking these medications     ascorbic acid 1,000 mg tablet; Commonly known as: Vitamin C   bisacodyl 10 mg suppository; Commonly known as: Dulcolax   cholecalciferol 25 MCG (1000 UT) capsule; Commonly known as: Vitamin D-3   cyanocobalamin 500 mcg tablet; Commonly known as: Vitamin B-12   docusate sodium 100 mg capsule; Commonly known as: Colace   Eliquis 5 mg tablet; Generic drug: apixaban   lactobacillus acidophilus capsule   * lidocaine 4 % patch   * lidocaine 4 % patch   magnesium hydroxide 400 mg/5 mL suspension; Commonly known as: Milk of   Magnesia   mineral oil enema   sennosides-docusate sodium 8.6-50 mg tablet; Commonly known as:   Corrie-Colace   Systane (propylene glycoL) 0.4-0.3 % drops ophthalmic drops; Generic   drug: peg 400-propylene glycol  * This list has 2 medication(s) that are the same as other medications   prescribed for you. Read the directions carefully, and ask your doctor or   other care  provider to review them with you.     STOP taking these medications     furosemide 20 mg tablet; Commonly known as: Lasix       Outpatient Follow-Up  No future appointments.    Winifred Prabhakar MD

## 2024-01-22 ENCOUNTER — APPOINTMENT (OUTPATIENT)
Dept: ORTHOPEDIC SURGERY | Facility: CLINIC | Age: 89
End: 2024-01-22
Payer: MEDICARE

## 2024-01-22 ENCOUNTER — HOSPITAL ENCOUNTER (OUTPATIENT)
Dept: RADIOLOGY | Facility: CLINIC | Age: 89
Discharge: HOME | End: 2024-01-22
Payer: MEDICARE

## 2024-01-22 ENCOUNTER — OFFICE VISIT (OUTPATIENT)
Dept: ORTHOPEDIC SURGERY | Facility: CLINIC | Age: 89
End: 2024-01-22
Payer: MEDICARE

## 2024-01-22 VITALS — WEIGHT: 123 LBS | BODY MASS INDEX: 23.22 KG/M2 | HEIGHT: 61 IN

## 2024-01-22 DIAGNOSIS — S32.409D CLOSED NONDISPLACED FRACTURE OF ACETABULUM WITH ROUTINE HEALING, UNSPECIFIED PORTION OF ACETABULUM, UNSPECIFIED LATERALITY, SUBSEQUENT ENCOUNTER: ICD-10-CM

## 2024-01-22 PROCEDURE — 72190 X-RAY EXAM OF PELVIS: CPT | Performed by: RADIOLOGY

## 2024-01-22 PROCEDURE — 72190 X-RAY EXAM OF PELVIS: CPT

## 2024-01-22 PROCEDURE — 1125F AMNT PAIN NOTED PAIN PRSNT: CPT | Performed by: ORTHOPAEDIC SURGERY

## 2024-01-22 PROCEDURE — 99024 POSTOP FOLLOW-UP VISIT: CPT | Performed by: ORTHOPAEDIC SURGERY

## 2024-01-22 PROCEDURE — 1036F TOBACCO NON-USER: CPT | Performed by: ORTHOPAEDIC SURGERY

## 2024-01-22 PROCEDURE — 1160F RVW MEDS BY RX/DR IN RCRD: CPT | Performed by: ORTHOPAEDIC SURGERY

## 2024-01-22 ASSESSMENT — PAIN SCALES - GENERAL: PAINLEVEL_OUTOF10: 8

## 2024-01-22 ASSESSMENT — PAIN - FUNCTIONAL ASSESSMENT: PAIN_FUNCTIONAL_ASSESSMENT: 0-10

## 2024-01-22 NOTE — PROGRESS NOTES
Subjective    Patient ID: Cassia Humphries is a 89 y.o. female.    Chief Complaint: Hospital Follow-up of the Pelvis (S/P tab fracture/)     Last Surgery: No surgery found  Last Surgery Date: No surgery found    HPI  Patient is a 89 y.o. female who is following up after a hospital visit for a left acetabular fracture. Patient is here today with her daughter who is supplying information about the patient's recovery. Patient has returned back to the nursing home where they continue to work on weight bearing and PT. Patient still has pain in the left hip, but patient's daughter states that it seems like it is getting better since the fall. Patient states that it feels the same. Patient is having difficulty ambulating due to severe knee osteoarthritis. Patient uses a walker to ambulate. Patient takes tylenol arthritis for the pain. Patient denies fever or chills, N/T or calf pain.     ROS: All other systems have been reviewed and are negative except as previously noted in history of present illness.       IMP:  Problem List Items Addressed This Visit    None  Visit Diagnoses       Closed nondisplaced fracture of acetabulum with routine healing, unspecified portion of acetabulum, unspecified laterality, subsequent encounter        Relevant Orders    XR pelvis 3+ views            Objective   General: Alert and oriented x 3, NAD, respirations easy and unlabored with no audible wheezes, skin warm and dry, speech and dress appropriate for noted age, affect euthymic.     Musculoskeletal: Left Lower Extremity  No swelling to lower leg  compartments soft  no calf tenderness  sensation intact to light touch  motor intact including TA/GS/EHL  palpable DP/PT pulses 2+     X-ray: Images of pelvis reviewed personally by me today and reveal maintenance of alignment of left acetabular fracture. Fracture is stable. Hip joint congruent. No secondary displacement of the acetabular fracture.      Assessment/Plan   Encounter  Diagnoses:  Closed nondisplaced fracture of acetabulum with routine healing, unspecified portion of acetabulum, unspecified laterality, subsequent encounter    PLAN: Patient is following up after a hospital visit for closed treatment of left acetabular fracture. Patient has returned back to the nursing home where they work on PT and OT. Patient still has pain in the left hip, but patient's daughter thinks that it is getting better then initial injury. Patient states that it feels the same. Patient has severe osteoarthritis of the left knee which is making it difficult for the patient to ambulate. Patient uses a walker to ambulate. Imaging showed that the left acetabular fracture is stable with no secondary displacement. Hip joint is congruent. Patient will continue to work with PT on ROM, gait training, quad strengthening, and weight bearing as tolerated. Patient is educated that the bone is still healing and it is common to still have pain in the hip. She is also educated that she may not return to her baseline that she was previously at before her fall, but working with PT and OT will hopefully bring her back to it. It is important to continue to have her walk for activities of daily living. Patient will follow up in 3 months. Patient is in agreement with this plan. Xrays of pelvis 3+ views will be needed at this visit.     Orders Placed This Encounter    XR pelvis 3+ views     No follow-ups on file.

## 2024-02-01 ENCOUNTER — TELEPHONE (OUTPATIENT)
Dept: PRIMARY CARE | Facility: CLINIC | Age: 89
End: 2024-02-01
Payer: MEDICARE

## 2024-02-01 NOTE — TELEPHONE ENCOUNTER
[facility] left a msg stating that this pt will discharge from rehab soon with home care orders for darcie kimbrough, PT/OT,  Will you follow?

## 2024-02-05 ENCOUNTER — PATIENT OUTREACH (OUTPATIENT)
Dept: PRIMARY CARE | Facility: CLINIC | Age: 89
End: 2024-02-05
Payer: MEDICARE

## 2024-02-05 NOTE — TELEPHONE ENCOUNTER
She was at a nursing skill facility and was discharge to home. These are homecare orders - homecare is the one who is calling to see if you would follow.

## 2024-02-05 NOTE — TELEPHONE ENCOUNTER
Pt's daughter called. They need us to call Lex at [facility] with a verbal approval for pt/ot only. She was just discharged from a skilled nursing facility and needs home care now. The phone number is [phone] ask for Lex. There has been a delay and they need the verbal approval ASA.

## 2024-02-05 NOTE — PROGRESS NOTES
Discharge Facility: [facility]   Discharge Diagnosis:  NONDISPLACED FRACTURE OF POSTERIOR WALL OF LEFT ACETABULUM, SUBSEQUENT ENCOUNTER FOR FRACTURE WITH ROUTINE HEALING  Admission Date:  12-23-24   Discharge Date: 2-2-24     PCP Appointment Date: 2-14-24   Specialist Appointment Date: Ortho Follow up 4-22-24   Hospital Encounter and Summary: [facility]   See discharge assessment below for further details    Pt. Daughter has a question about ATB. And the resistance. I did find this in [facility]     [id number] CULTURE, URINE . (Final) Source: URINE 20-25,000 CFU/mL ESCHERICHIA COLI. Sensitivity E. COLI AMP/SULBACTAM S <=8/4 AMPICILLIN S <=8 BACTRIM (TRIMETH/S S <=2/38 CEFAZOLIN S <=2 CEFTRIAXONE S <=1 CIPROFLOXICIN S <=1 GENTAMICIN S <=4 LEVOFLOXACIN S <=2 NITROFURANTOIN S <=32 PIPERACILLIN/TAZOB S <=16 TETRACYCLINE S <=4 TOBRAMYCIN S <=4      Engagement  Call Start Time: 1251 (2/5/2024 12:54 PM)    Medications  Medications reviewed with patient/caregiver?: Yes (2/5/2024 12:54 PM)  Is the patient having any side effects they believe may be caused by any medication additions or changes?: No (2/5/2024 12:54 PM)  Does the patient have all medications ordered at discharge?: Yes (2/5/2024 12:54 PM)  Care Management Interventions: No intervention needed (2/5/2024 12:54 PM)  Is the patient taking all medications as directed (includes completed medication regime)?: Yes (2/5/2024 12:54 PM)  Care Management Interventions: Provided patient education (2/5/2024 12:54 PM)    Appointments  Does the patient have a primary care provider?: Yes (2/5/2024 12:54 PM)  Care Management Interventions: Verified appointment date/time/provider (2/5/2024 12:54 PM)  Has the patient kept scheduled appointments due by today?: Yes (2/5/2024 12:54 PM)  Care Management Interventions: Advised patient to keep appointment (2/5/2024 12:54 PM)    Self Management  What is the home health agency?: [facility] (2/5/2024 12:54 PM)  Has  home health visited the patient within 72 hours of discharge?: Call prior to 72 hours (2/5/2024 12:54 PM)    Patient Teaching  Does the patient have access to their discharge instructions?: Yes (2/5/2024 12:54 PM)  Care Management Interventions: Reviewed instructions with patient (2/5/2024 12:54 PM)  What is the patient's perception of their health status since discharge?: Same (2/5/2024 12:54 PM)  Is the patient/caregiver able to teach back the hierarchy of who to call/visit for symptoms/problems? PCP, Specialist, Home Health nurse, Urgent Care, ED, 911: Yes (2/5/2024 12:54 PM)      Devaughn Chan LPN

## 2024-02-06 DIAGNOSIS — R30.0 DYSURIA: Primary | ICD-10-CM

## 2024-02-06 NOTE — PROGRESS NOTES
Please place a urine culture order in patient's chart. Pt's Daughter still come pick this up today or tomorrow.

## 2024-02-06 NOTE — PROGRESS NOTES
Message was Left on pt's daughter voicemail stating orders are placed and the urine collection material is up front for .

## 2024-02-06 NOTE — PROGRESS NOTES
Talked with pt daughter (Charlette) okay per HIPAA, She stated her mother is having Frequent of urination, does not feel great, pain in bladder  Urination is better during day, more frequent urination at night and burning with urination. She took 3 dosage of Cipro then they started her on Cephalexin which she ended yesterday. The Urine Sample was collected on 01/24/24

## 2024-02-07 ENCOUNTER — TELEPHONE (OUTPATIENT)
Dept: PRIMARY CARE | Facility: CLINIC | Age: 89
End: 2024-02-07
Payer: MEDICARE

## 2024-02-07 NOTE — TELEPHONE ENCOUNTER
Called Iesha at [facility] and she stated that its only OT/PT - but the main reason she is calling to let you know the pt's reported she has 8/10 abdominal pain.

## 2024-02-07 NOTE — TELEPHONE ENCOUNTER
Charlette - says it does not seem new. She said she is watching it closely. She said she has had no bowel movement for 1 day in a half , she's not clammy, she has no fever. She contacted Gastro - gave her dicyclomine 10mg one tab at 230-3pm today and can she take another one at dinner?    Charlette gave her mother, Hot pad, Hot broth, Gisselle tea and eating lightly.

## 2024-02-07 NOTE — TELEPHONE ENCOUNTER
Iesha, with [facility], left a msg stating that the pt is denying skilled nursing, but she wants the PT.  Pt also stated that he has a lot of abd pain and gas.

## 2024-02-08 ENCOUNTER — TELEPHONE (OUTPATIENT)
Dept: PRIMARY CARE | Facility: CLINIC | Age: 89
End: 2024-02-08

## 2024-02-08 ENCOUNTER — LAB (OUTPATIENT)
Dept: LAB | Facility: LAB | Age: 89
End: 2024-02-08
Payer: MEDICARE

## 2024-02-08 DIAGNOSIS — R30.0 DYSURIA: ICD-10-CM

## 2024-02-08 LAB
APPEARANCE UR: ABNORMAL
BILIRUB UR STRIP.AUTO-MCNC: NEGATIVE MG/DL
COLOR UR: YELLOW
GLUCOSE UR STRIP.AUTO-MCNC: NEGATIVE MG/DL
KETONES UR STRIP.AUTO-MCNC: NEGATIVE MG/DL
LEUKOCYTE ESTERASE UR QL STRIP.AUTO: NEGATIVE
NITRITE UR QL STRIP.AUTO: NEGATIVE
PH UR STRIP.AUTO: 7 [PH]
PROT UR STRIP.AUTO-MCNC: NEGATIVE MG/DL
RBC # UR STRIP.AUTO: NEGATIVE /UL
SP GR UR STRIP.AUTO: 1.01
UROBILINOGEN UR STRIP.AUTO-MCNC: <2 MG/DL

## 2024-02-08 PROCEDURE — 81003 URINALYSIS AUTO W/O SCOPE: CPT

## 2024-02-08 PROCEDURE — 87086 URINE CULTURE/COLONY COUNT: CPT

## 2024-02-08 NOTE — TELEPHONE ENCOUNTER
JOSE Jones with [facility]. Left a msg asking for a verbal to be able to move the pt's visit for this week to next week.  Ph:  [phone]

## 2024-02-09 LAB — BACTERIA UR CULT: NORMAL

## 2024-02-09 NOTE — TELEPHONE ENCOUNTER
Per HIPAA, ok to leave detailed message - LM on Charlette voicemail with this information. She should call back if she has any questions

## 2024-02-09 NOTE — TELEPHONE ENCOUNTER
Talked to daughter (Charlette) and I asked her if she knew anything about this and she said no- she reported that they were going to discontinue OT and just do PT for upper and lower - she advised she would try calling Karen herself and find out what's going on.

## 2024-02-09 NOTE — TELEPHONE ENCOUNTER
Charlette said her mom is feeling much better today after getting her up and moving. She said she only took two dicyclomine

## 2024-02-09 NOTE — TELEPHONE ENCOUNTER
I relayed message about her UA looks good and that the culture is still pending. She verbalized understanding.

## 2024-02-09 NOTE — TELEPHONE ENCOUNTER
Pt's daughter Charlette called back and stated most of the Abdominal pain went away and she was able to pass a bowel movement. She reported her mom did have a bit of confusion last night but since this morning seemed to resolve. Pt's daughter is concerned about the ongoing UTI - she would like to know if results are back as she dropped it off yesterday

## 2024-02-12 NOTE — TELEPHONE ENCOUNTER
----- Message from CALIXTO Noriega sent at 2/9/2024  9:04 PM EST -----  Please let her daughter know the urine culture was negative for an infection.

## 2024-02-12 NOTE — TELEPHONE ENCOUNTER
Talked to pts daughter (nathan) and let her know we haven't had anything faxed over to us about orders. Told her to contact them again and have them fax it over to us. She verbalized understanding and had no further questions.

## 2024-02-12 NOTE — TELEPHONE ENCOUNTER
Pt daughter (Charlette) is calling in saying there is a delay in her mom's PT therapy. She said can you please sign the order and send it back to them since this is Day 11 without any PT. Charlette is saying no to skilled nursing and no to OT therapy just PT therapy.  She said if you need to call nereyda to get this expedited [phone]

## 2024-02-12 NOTE — TELEPHONE ENCOUNTER
Talked with pt daughter (nathan) about results and she verbalized understanding and had no further questions at this time.

## 2024-02-14 ENCOUNTER — OFFICE VISIT (OUTPATIENT)
Dept: PRIMARY CARE | Facility: CLINIC | Age: 89
End: 2024-02-14
Payer: MEDICARE

## 2024-02-14 VITALS
OXYGEN SATURATION: 97 % | HEART RATE: 78 BPM | SYSTOLIC BLOOD PRESSURE: 138 MMHG | DIASTOLIC BLOOD PRESSURE: 80 MMHG | TEMPERATURE: 97.6 F

## 2024-02-14 DIAGNOSIS — I26.99 PULMONARY EMBOLISM, UNSPECIFIED CHRONICITY, UNSPECIFIED PULMONARY EMBOLISM TYPE, UNSPECIFIED WHETHER ACUTE COR PULMONALE PRESENT (MULTI): ICD-10-CM

## 2024-02-14 DIAGNOSIS — D64.9 ANEMIA, UNSPECIFIED TYPE: ICD-10-CM

## 2024-02-14 DIAGNOSIS — M75.101 TEAR OF RIGHT ROTATOR CUFF, UNSPECIFIED TEAR EXTENT, UNSPECIFIED WHETHER TRAUMATIC: ICD-10-CM

## 2024-02-14 DIAGNOSIS — S32.402D CLOSED NONDISPLACED FRACTURE OF LEFT ACETABULUM WITH ROUTINE HEALING, UNSPECIFIED PORTION OF ACETABULUM, SUBSEQUENT ENCOUNTER: Primary | ICD-10-CM

## 2024-02-14 DIAGNOSIS — M17.0 PRIMARY OSTEOARTHRITIS OF BOTH KNEES: ICD-10-CM

## 2024-02-14 DIAGNOSIS — I10 BENIGN ESSENTIAL HYPERTENSION: ICD-10-CM

## 2024-02-14 DIAGNOSIS — E87.1 HYPONATREMIA: ICD-10-CM

## 2024-02-14 DIAGNOSIS — R53.1 GENERALIZED WEAKNESS: ICD-10-CM

## 2024-02-14 PROCEDURE — 1159F MED LIST DOCD IN RCRD: CPT | Performed by: NURSE PRACTITIONER

## 2024-02-14 PROCEDURE — 99214 OFFICE O/P EST MOD 30 MIN: CPT | Performed by: NURSE PRACTITIONER

## 2024-02-14 PROCEDURE — 3075F SYST BP GE 130 - 139MM HG: CPT | Performed by: NURSE PRACTITIONER

## 2024-02-14 PROCEDURE — 1036F TOBACCO NON-USER: CPT | Performed by: NURSE PRACTITIONER

## 2024-02-14 PROCEDURE — 1160F RVW MEDS BY RX/DR IN RCRD: CPT | Performed by: NURSE PRACTITIONER

## 2024-02-14 PROCEDURE — 1157F ADVNC CARE PLAN IN RCRD: CPT | Performed by: NURSE PRACTITIONER

## 2024-02-14 PROCEDURE — 3079F DIAST BP 80-89 MM HG: CPT | Performed by: NURSE PRACTITIONER

## 2024-02-14 PROCEDURE — 1125F AMNT PAIN NOTED PAIN PRSNT: CPT | Performed by: NURSE PRACTITIONER

## 2024-02-14 ASSESSMENT — PATIENT HEALTH QUESTIONNAIRE - PHQ9
1. LITTLE INTEREST OR PLEASURE IN DOING THINGS: NOT AT ALL
SUM OF ALL RESPONSES TO PHQ9 QUESTIONS 1 AND 2: 0
2. FEELING DOWN, DEPRESSED OR HOPELESS: NOT AT ALL

## 2024-02-14 NOTE — PROGRESS NOTES
Subjective    Cassia Humphries is a 89 y.o. female who presents for Hospital Follow-up (On rehab).    HPI  She presents to the office today along with her daughter for a hospital/rehab follow-up. She was at rehab for acetabular fracture. Was discharged to home. Her daughter has been staying with her. They are in the process of getting home PT/OT set up.  She is able to transfer with assistance. Strength is still an issues given injuries/OA to bilateral shoulders, left acetabular fracture and Bilateral knee OA.    She is tolerating medication well at current dose- no side effects. No chest pain, shortness of breath, palpitations or edema. No headaches, numbness, tingling, or vision changes.  No dizziness.  (+) pain in thighs today.   She also continues to have intermittent abdominal cramping (not new). Tried Dicyclomine which helped. It did cause some slight confusion.   No nausea or vomiting.  Is eating a soft or pureed diet as she has lost many of her upper teeth.   Stools go between diarrhea and constipation.  Home blood pressure readings: Currently off BP medications  120-130/70-80 per latashaer  Occasionally will spike after PT, and then go back down.  Last labs:   Daughter brought a copy of the labs from the recent rehab stay- reviewed today and they are stable.    Daughter reports her cognition is not back to baseline. She has seen a slight decline with recent hospitalizations and rehab stays.    Review of Systems   Constitutional:  Negative for chills, fatigue and fever.   Eyes:  Negative for visual disturbance.   Respiratory:  Negative for cough, chest tightness and shortness of breath.    Cardiovascular:  Negative for chest pain, palpitations and leg swelling.   Gastrointestinal:  Positive for abdominal pain, constipation and diarrhea. Negative for nausea and vomiting.   Musculoskeletal:  Positive for arthralgias.   Neurological:  Positive for weakness. Negative for dizziness, numbness and headaches.      Objective   /80 (BP Location: Left arm, Patient Position: Sitting)   Pulse 78   Temp 36.4 °C (97.6 °F) (Temporal)   SpO2 97%     Physical Exam  Constitutional:       General: She is not in acute distress.     Appearance: Normal appearance. She is not toxic-appearing.   Eyes:      Extraocular Movements: Extraocular movements intact.      Conjunctiva/sclera: Conjunctivae normal.      Pupils: Pupils are equal, round, and reactive to light.   Cardiovascular:      Rate and Rhythm: Normal rate and regular rhythm.      Pulses: Normal pulses.      Heart sounds: Normal heart sounds, S1 normal and S2 normal. No murmur heard.  Pulmonary:      Effort: Pulmonary effort is normal. No respiratory distress.      Breath sounds: Normal breath sounds.   Abdominal:      General: Bowel sounds are normal.      Palpations: Abdomen is soft.      Tenderness: There is generalized abdominal tenderness.   Musculoskeletal:      Right lower leg: No edema.      Left lower leg: No edema.   Lymphadenopathy:      Cervical: No cervical adenopathy.   Neurological:      Mental Status: She is alert and oriented to person, place, and time.   Psychiatric:         Attention and Perception: Attention normal.         Mood and Affect: Mood and affect normal.         Behavior: Behavior normal. Behavior is cooperative.         Thought Content: Thought content normal.         Cognition and Memory: Cognition normal.         Judgment: Judgment normal.         Assessment/Plan   Problem List Items Addressed This Visit       Anemia     Clinically stable.  Stable on recent labs at the end of January.         Benign essential hypertension     Well-controlled today.  Will continue to monitor to determine if we need resume medication.         Closed nondisplaced fracture of left acetabulum with routine healing - Primary     Continue to follow with orthopedics.  Home health care PT.         Relevant Orders    Referral to Home Care    Hyponatremia     Stable  on labs done at the end of January.         Primary osteoarthritis of both knees    Relevant Orders    Referral to Home Care    Pulmonary embolism (CMS/[facility])     Continue Eliquis at the current dose.         Relevant Medications    apixaban (Eliquis) 5 mg tablet    Tear of right rotator cuff    Relevant Orders    Referral to Home Care    Generalized weakness     Home health care PT evaluate and treat.                 It has been a pleasure seeing you today!

## 2024-02-15 PROBLEM — I10 HTN (HYPERTENSION): Status: RESOLVED | Noted: 2023-06-05 | Resolved: 2024-02-15

## 2024-02-15 ASSESSMENT — ENCOUNTER SYMPTOMS
VOMITING: 0
FATIGUE: 0
PALPITATIONS: 0
CHILLS: 0
CONSTIPATION: 1
DIZZINESS: 0
NAUSEA: 0
HEADACHES: 0
COUGH: 0
ABDOMINAL PAIN: 1
WEAKNESS: 1
SHORTNESS OF BREATH: 0
ARTHRALGIAS: 1
NUMBNESS: 0
DIARRHEA: 1
CHEST TIGHTNESS: 0
FEVER: 0

## 2024-02-15 NOTE — TELEPHONE ENCOUNTER
Talked to Te[facility]- she said she got all the orders and they are waiting for insurance authorization to move forward with PT. She advised me that they are not needing anything else from us in this moment.       Talked to Charlette and explained everything to her. She said she will call insurance and see if she can get this squared away.

## 2024-02-15 NOTE — TELEPHONE ENCOUNTER
Evelyn called back and said that it was sent from Dr Noonan.    Called Evelyn again and LM on voicemail stating Lizbeth is taking over for her PCP - and let her know that Lizbeth called [facility] on 02/5/24 and gave verbal to do PT. - I also told her if there is anything that needs to be faxed to us and signed to please write attention to T[facility].

## 2024-02-15 NOTE — TELEPHONE ENCOUNTER
Evelyn from [facility] called today and says she got the referral I sent over yesterday for pt - she said she already had a referral from her pcp and she is waiting for authorization for treatment.    I called Evelyn and FREDIS to call back and let us know who did the first referral come from so this can help us moving forward.

## 2024-02-16 ENCOUNTER — PATIENT OUTREACH (OUTPATIENT)
Dept: CARE COORDINATION | Facility: CLINIC | Age: 89
End: 2024-02-16
Payer: MEDICARE

## 2024-02-16 ENCOUNTER — TELEPHONE (OUTPATIENT)
Dept: PRIMARY CARE | Facility: CLINIC | Age: 89
End: 2024-02-16
Payer: MEDICARE

## 2024-02-16 NOTE — TELEPHONE ENCOUNTER
Shazia, OT with [facility], left a msg that this pt refused OT last week.

## 2024-02-16 NOTE — PROGRESS NOTES
Call regarding appt. with PCP on (2-14-24) after hospitalization.  At time of outreach call the patient feels as if their condition has (improved) since last visit.  Reviewed the PCP appointment with the pt and addressed any questions or concerns.    Pt. Has gone from discharge from SNF until current with out Home Health Care.     Devaughn Chan LPN

## 2024-02-20 NOTE — TELEPHONE ENCOUNTER
Erich Baird,  with [facility], left a msg stating that she had completed her eval and found no further visits are needed at this time.  Her ph:  [phone]

## 2024-02-20 NOTE — TELEPHONE ENCOUNTER
Talked to Griselda from [facility], she said symptoms started last night with the redness- all others started this morning. She said it is not painful to her knowledge, no fever or chills, no injury to her foot to her knowledge.    Talked to pt's alissa (nathan) she said it's her right foot, not as red/warm this afternoon as it was last night/this morning. She said it's still puffy. She said it's only painful because PT was just there and worked out her leg. She said it is tender. She said pt had no fever or chills.

## 2024-02-20 NOTE — TELEPHONE ENCOUNTER
Griselda ESQUIVEL RN from [facility] called it regarding pt. She is concerned that her right lower extremity is swollen, red, shiny and warm to touch. Please advise

## 2024-02-22 ENCOUNTER — TELEPHONE (OUTPATIENT)
Dept: PRIMARY CARE | Facility: CLINIC | Age: 89
End: 2024-02-22
Payer: MEDICARE

## 2024-02-22 NOTE — TELEPHONE ENCOUNTER
Lizbeth, a nurse with [facility] asking for verbal order for skilled nursing & OT for patient's recent hospital visit for cellulitis. States patient's daughter is on board with this. Lizbeth can be reached at [phone]. Please advise

## 2024-02-22 NOTE — TELEPHONE ENCOUNTER
Left message on secure voicemail line for [facility] with verbal orders per Lizbeth Richards's response

## 2024-02-22 NOTE — TELEPHONE ENCOUNTER
Talked to pt'helen maxwell (Charlette) - she said she in currently in the hospital because her foot looked bad last night. They gave her a round of antibiotics and she is now on IV.

## 2024-02-23 ENCOUNTER — PATIENT OUTREACH (OUTPATIENT)
Dept: CARE COORDINATION | Facility: CLINIC | Age: 89
End: 2024-02-23
Payer: MEDICARE

## 2024-02-23 NOTE — PROGRESS NOTES
Unable to reach patient for call back after patient's follow up appointment with PCP.   LVM with call back number for patient to call if needed   If no voicemail available call attempts x 2 were made to contact the patient to assist with any questions or concerns patient may have.    Devaughn Chan LPN    
[facility]

## 2024-02-28 ENCOUNTER — TELEPHONE (OUTPATIENT)
Dept: PRIMARY CARE | Facility: CLINIC | Age: 89
End: 2024-02-28
Payer: MEDICARE

## 2024-02-28 NOTE — TELEPHONE ENCOUNTER
Madhu from [facility] is calling to notify missed appointments of physical therapy due to waiting on insurance authorization- It was back dated.

## 2024-03-04 ENCOUNTER — TELEPHONE (OUTPATIENT)
Dept: PRIMARY CARE | Facility: CLINIC | Age: 89
End: 2024-03-04
Payer: MEDICARE

## 2024-03-04 NOTE — TELEPHONE ENCOUNTER
Matty from OT is calling and just completed OT eval. They will continue seeing patient 1x a week for 5 weeks. If you have any questions or concerns, call her at [phone]

## 2024-03-05 ENCOUNTER — TELEPHONE (OUTPATIENT)
Dept: PRIMARY CARE | Facility: CLINIC | Age: 89
End: 2024-03-05
Payer: MEDICARE

## 2024-03-05 NOTE — TELEPHONE ENCOUNTER
Pt's daughter Charlette is calling in stating that the last time we spoke, she was in the hospital. She did 3 rounds of IV antibiotics and then sent home for home antibiotics which she just finished. Pt's daughter is still worried about her mom, she said her swelling is taking a very long time and seems to have a lot of fluid rentention buildup and still some redness still. She is advising maybe scheduling an appointment this week to come in - she also said she is still having trouble with in home PT- she is wondering if you would write more so she is to have more PT but said this can be discussed at the appointment. - please look at your schedule and let me know where you want me to place her?

## 2024-03-06 ENCOUNTER — PATIENT OUTREACH (OUTPATIENT)
Dept: CARE COORDINATION | Facility: CLINIC | Age: 89
End: 2024-03-06
Payer: MEDICARE

## 2024-03-06 NOTE — PROGRESS NOTES
Call placed regarding one month post discharge follow up call.  At time of outreach call the patient feels as if their condition has (improved) since initial visit with PCP or specialist.  Questions or concerns regarding recovery period addressed at this time.   Reviewed any PCP or specialists progress notes/labs/radiology reports if applicable and addressed any questions or concerns.    Pt. Daughter continues to voice concerns from PT visits. Pt. Is scheduled for follow up on 3-8-24 with PCP to discuss further.       Devaughn Chan LPN

## 2024-03-08 ENCOUNTER — OFFICE VISIT (OUTPATIENT)
Dept: PRIMARY CARE | Facility: CLINIC | Age: 89
End: 2024-03-08
Payer: MEDICARE

## 2024-03-08 VITALS
TEMPERATURE: 97.8 F | OXYGEN SATURATION: 100 % | SYSTOLIC BLOOD PRESSURE: 152 MMHG | DIASTOLIC BLOOD PRESSURE: 80 MMHG | HEART RATE: 68 BPM

## 2024-03-08 DIAGNOSIS — L03.115 CELLULITIS OF RIGHT FOOT: Primary | ICD-10-CM

## 2024-03-08 DIAGNOSIS — R53.1 GENERALIZED WEAKNESS: ICD-10-CM

## 2024-03-08 PROCEDURE — 3077F SYST BP >= 140 MM HG: CPT | Performed by: NURSE PRACTITIONER

## 2024-03-08 PROCEDURE — 1157F ADVNC CARE PLAN IN RCRD: CPT | Performed by: NURSE PRACTITIONER

## 2024-03-08 PROCEDURE — 1160F RVW MEDS BY RX/DR IN RCRD: CPT | Performed by: NURSE PRACTITIONER

## 2024-03-08 PROCEDURE — 99213 OFFICE O/P EST LOW 20 MIN: CPT | Performed by: NURSE PRACTITIONER

## 2024-03-08 PROCEDURE — 1159F MED LIST DOCD IN RCRD: CPT | Performed by: NURSE PRACTITIONER

## 2024-03-08 PROCEDURE — 1125F AMNT PAIN NOTED PAIN PRSNT: CPT | Performed by: NURSE PRACTITIONER

## 2024-03-08 PROCEDURE — 3079F DIAST BP 80-89 MM HG: CPT | Performed by: NURSE PRACTITIONER

## 2024-03-08 PROCEDURE — 1036F TOBACCO NON-USER: CPT | Performed by: NURSE PRACTITIONER

## 2024-03-08 RX ORDER — ACETAMINOPHEN 500 MG
TABLET ORAL EVERY 6 HOURS PRN
COMMUNITY

## 2024-03-08 RX ORDER — DEXTROMETHORPHAN HYDROBROMIDE, GUAIFENESIN 5; 100 MG/5ML; MG/5ML
650 LIQUID ORAL EVERY 8 HOURS PRN
COMMUNITY

## 2024-03-08 RX ORDER — AMOXICILLIN AND CLAVULANATE POTASSIUM 875; 125 MG/1; MG/1
TABLET, FILM COATED ORAL EVERY 12 HOURS
COMMUNITY
End: 2024-03-08 | Stop reason: ALTCHOICE

## 2024-03-08 ASSESSMENT — ENCOUNTER SYMPTOMS
PALPITATIONS: 0
NAUSEA: 0
ARTHRALGIAS: 1
FATIGUE: 1
BACK PAIN: 1
SHORTNESS OF BREATH: 0
FEVER: 0
CHILLS: 0
WHEEZING: 0
ABDOMINAL PAIN: 1
VOMITING: 0
COUGH: 0
CHEST TIGHTNESS: 0
DIARRHEA: 0

## 2024-03-08 ASSESSMENT — PATIENT HEALTH QUESTIONNAIRE - PHQ9
SUM OF ALL RESPONSES TO PHQ9 QUESTIONS 1 AND 2: 3
2. FEELING DOWN, DEPRESSED OR HOPELESS: NEARLY EVERY DAY
1. LITTLE INTEREST OR PLEASURE IN DOING THINGS: NOT AT ALL

## 2024-03-08 NOTE — PATIENT INSTRUCTIONS
Continue to work with home PT and OT.  Continue to monitor the right foot.  Focus on elevated of feet.

## 2024-03-08 NOTE — TELEPHONE ENCOUNTER
Madhu is calling back saying the patient missed another PT appointment last week due to waiting on insurance approval.

## 2024-03-08 NOTE — PROGRESS NOTES
Subjective   Cassia Humphries is a 89 y.o. female who presents for Hospital Follow-up, Foot Swelling (Right Swelling and Redness still present after being in the hospital ), and PT Treatment (Needs ongoing PT order ).    HPI  She presents to the office with her daughter Charlette today for a follow up on right foot cellulitis. On 2/20/24 her right foot was noted to be red. By 2/21/2024 she developed worsening right foot redness, warmth and swellng. Went to [facility] for evaluation- given Vancomycin and Ampicillin and monitored overnight- sent home on Augmentin for 6 days.   RLE venous US negative for DVT on 2/22/24  No fever or chills.   Finished the Augmentin on 2/28/24 and tolerated well.   Had a podiatry appt for nail trimming 5 days prior to symptoms starting.   Her daughter reports her right foot is still a bit puffy but overall looks much better.  She presents today in a wheelchair  Needs assistance with transfers.  Uses a wheeled walker around the house. Wheelchair anytime outside the house.  Daughter is seeing some improvement.  She would benefit from ongoing [facility] PT and OT services.    Review of Systems   Constitutional:  Positive for fatigue. Negative for chills and fever.   Respiratory:  Negative for cough, chest tightness, shortness of breath and wheezing.    Cardiovascular:  Positive for leg swelling. Negative for chest pain and palpitations.   Gastrointestinal:  Positive for abdominal pain. Negative for diarrhea, nausea and vomiting.        (+) chronic abdominal pain- not new   Musculoskeletal:  Positive for arthralgias and back pain.        (+) chronic back and joint pain       Objective   /80   Pulse 68   Temp 36.6 °C (97.8 °F) (Temporal)   SpO2 100%     Physical Exam  Constitutional:       General: She is not in acute distress.     Appearance: Normal appearance. She is not toxic-appearing.      Comments: She is quiet today throughout the exam.   Cardiovascular:      Rate and  Rhythm: Normal rate and regular rhythm.      Pulses: Normal pulses.      Heart sounds: Normal heart sounds, S1 normal and S2 normal.      Comments: (+) edema noted to bilateral feet (R slightly more than the left)  Pulmonary:      Effort: Pulmonary effort is normal.      Breath sounds: Normal breath sounds and air entry.   Abdominal:      General: Bowel sounds are normal.      Palpations: Abdomen is soft.      Tenderness: There is generalized abdominal tenderness.   Musculoskeletal:      Right lower le+ Edema present.      Left lower le+ Edema present.   Feet:      Comments: (+) edema noted to bilateral feet (Right slightly more than the left).  (+) slight shiny and purple discoloration noted to bilateral feet.   No significant redness noted.   No warmth noted on palpation.  Pedal pulses 2+ bilaterally.   Lymphadenopathy:      Cervical: No cervical adenopathy.   Neurological:      Mental Status: She is alert and oriented to person, place, and time.   Psychiatric:         Mood and Affect: Mood normal.         Behavior: Behavior normal.         Thought Content: Thought content normal.         Judgment: Judgment normal.         Assessment/Plan   Problem List Items Addressed This Visit       Generalized weakness     She would benefit from further [facility] PT and OT services.         Cellulitis of right foot - Primary     Clinically looks much better. Continue to monitor and focus on elevation.            It has been a pleasure seeing you today!

## 2024-03-13 ENCOUNTER — TELEPHONE (OUTPATIENT)
Dept: PRIMARY CARE | Facility: CLINIC | Age: 89
End: 2024-03-13
Payer: MEDICARE

## 2024-03-13 NOTE — TELEPHONE ENCOUNTER
KENZIE, FROM [facility] CALLED TO STATE THAT PT OCCUPATIONAL THERAPY EVALUATION WAS DELAYED BUT WAS COMPLETED ON 03/01/24.    JUST WANTED TO NOTIFY THE OFFICE

## 2024-03-22 ENCOUNTER — TELEPHONE (OUTPATIENT)
Dept: PRIMARY CARE | Facility: CLINIC | Age: 89
End: 2024-03-22
Payer: MEDICARE

## 2024-03-22 NOTE — TELEPHONE ENCOUNTER
"LM on pt's daughter \"Charlette\" voicemail regarding this. Told her on the voicemail I will attempt to contact her Monday.  "

## 2024-03-22 NOTE — TELEPHONE ENCOUNTER
Iesha De La Cruz from  is calling in because pt's daughter is concerned with the lump on the left side of her back. It's been there for awhile and there is no sign of infection. Pt's daughter believes it's from when she broken her left vestibular. Please advise

## 2024-03-27 ENCOUNTER — DOCUMENTATION (OUTPATIENT)
Dept: PRIMARY CARE | Facility: CLINIC | Age: 89
End: 2024-03-27
Payer: MEDICARE

## 2024-04-03 ENCOUNTER — TELEPHONE (OUTPATIENT)
Dept: PRIMARY CARE | Facility: CLINIC | Age: 89
End: 2024-04-03
Payer: MEDICARE

## 2024-04-03 NOTE — TELEPHONE ENCOUNTER
Misti, from [facility] Speech therapist is calling in today asking to obtain a verbal for delay of care for speech therapy for the week of 04/07/24. Please advise.

## 2024-04-03 NOTE — TELEPHONE ENCOUNTER
She said because pt's daryl period of insurance is ending soon that she was told she should do the evaluation on the new re-certification. Insurance won't cover. Pt is now scheduled for 04/10/24

## 2024-04-04 NOTE — TELEPHONE ENCOUNTER
Pt's daughter Chalrette is calling in today saying the area on her back has now became painful when pressure is applied when she is laying down or sitting. She is wondering if this should be looked at or if she should go to the walk in orthopaedic to have them look into it. The PT therapist saw pt yesterday and they mentioned it should be looked at. Please advise.

## 2024-04-08 NOTE — TELEPHONE ENCOUNTER
I have not seen the lump. Is the lump of the skin or on the bone? If its a lump of the skin I dont an orthopedic walk in clinic would be the best place. Any redness or warmth to the lump? We could have her come in to have checked with first available provider. If it is inflamed or showing signs of infection- would recommend an urgent care evaluation to expedite evaluation.       She said it's located by L5 out a few inches from spine. Some days its pronounced and very tender but not sure if its of the skin or the bone. Pt notice it more while laying down on her back. There is No redness/ warmth and no bruising     Pt woke up this morning saying she has pain in right knee. Got pt scheduled with Dr Wyman 04/10/24        Talked to Charlette and advised pt should go to urgent care if it becomes warm, redness/ any signs of infections. She verbalized understanding and had no further questions.

## 2024-04-10 ENCOUNTER — HOSPITAL ENCOUNTER (OUTPATIENT)
Dept: RADIOLOGY | Facility: CLINIC | Age: 89
Discharge: HOME | End: 2024-04-10
Payer: MEDICARE

## 2024-04-10 ENCOUNTER — TELEPHONE (OUTPATIENT)
Dept: PRIMARY CARE | Facility: CLINIC | Age: 89
End: 2024-04-10

## 2024-04-10 ENCOUNTER — OFFICE VISIT (OUTPATIENT)
Dept: PRIMARY CARE | Facility: CLINIC | Age: 89
End: 2024-04-10
Payer: MEDICARE

## 2024-04-10 VITALS — SYSTOLIC BLOOD PRESSURE: 130 MMHG | TEMPERATURE: 97.1 F | DIASTOLIC BLOOD PRESSURE: 70 MMHG

## 2024-04-10 DIAGNOSIS — R22.2 MASS ON BACK: Primary | ICD-10-CM

## 2024-04-10 DIAGNOSIS — R22.2 MASS ON BACK: ICD-10-CM

## 2024-04-10 PROCEDURE — 1160F RVW MEDS BY RX/DR IN RCRD: CPT | Performed by: FAMILY MEDICINE

## 2024-04-10 PROCEDURE — 73521 X-RAY EXAM HIPS BI 2 VIEWS: CPT

## 2024-04-10 PROCEDURE — 3075F SYST BP GE 130 - 139MM HG: CPT | Performed by: FAMILY MEDICINE

## 2024-04-10 PROCEDURE — 99213 OFFICE O/P EST LOW 20 MIN: CPT | Performed by: FAMILY MEDICINE

## 2024-04-10 PROCEDURE — 3078F DIAST BP <80 MM HG: CPT | Performed by: FAMILY MEDICINE

## 2024-04-10 PROCEDURE — 1159F MED LIST DOCD IN RCRD: CPT | Performed by: FAMILY MEDICINE

## 2024-04-10 PROCEDURE — 72110 X-RAY EXAM L-2 SPINE 4/>VWS: CPT | Performed by: RADIOLOGY

## 2024-04-10 PROCEDURE — 1036F TOBACCO NON-USER: CPT | Performed by: FAMILY MEDICINE

## 2024-04-10 PROCEDURE — 72170 X-RAY EXAM OF PELVIS: CPT | Mod: 59

## 2024-04-10 PROCEDURE — 1157F ADVNC CARE PLAN IN RCRD: CPT | Performed by: FAMILY MEDICINE

## 2024-04-10 PROCEDURE — 72110 X-RAY EXAM L-2 SPINE 4/>VWS: CPT

## 2024-04-10 NOTE — PROGRESS NOTES
Subjective   Patient ID: Cassia Humphries is a 89 y.o. female who presents for Lump On Back (Unable to walk without pain).    Patient is presenting with her daughter Charlette due to unsteady gait and inability to bear weight on lower extremity specifically the right side.  She does have a history of a left hip fracture remotely.  She has noticed a new lump/mass on her low back that is left lateral of the spine in the L for L5 region.  It is exquisitely tender.  And she tends to bump it when she is sitting down because of its location.  She has been wheelchair-bound.    Objective   /70 (BP Location: Left arm, Patient Position: Sitting, BP Cuff Size: Adult)   Temp 36.2 °C (97.1 °F) (Skin)     Physical Exam  Constitutional:       Appearance: Normal appearance. She is normal weight.   HENT:      Head: Normocephalic and atraumatic.      Nose: Nose normal.      Mouth/Throat:      Mouth: Mucous membranes are moist.      Pharynx: Oropharynx is clear.   Eyes:      Extraocular Movements: Extraocular movements intact.      Conjunctiva/sclera: Conjunctivae normal.      Pupils: Pupils are equal, round, and reactive to light.   Pulmonary:      Effort: Pulmonary effort is normal.   Musculoskeletal:        Back:       Comments: Mass located on the left lower aspect of the low back as indicated in the picture.  Difficult to evaluate because it was exquisitely tender to palpation.  However it did appear solid, hard.  Unclear of its mobility because it was difficult to evaluate with her pain.   Neurological:      General: No focal deficit present.      Mental Status: She is alert.   Psychiatric:         Mood and Affect: Mood normal.         Assessment/Plan   Diagnoses and all orders for this visit:  Mass on back  -     US MSK lower extremity joints tendons muscles; Future  -     US abdomen limited; Future    Unclear etiology of the mass on her low back.  It is possible that is a sequelae of her severe osteoarthritis.  However it  could be a cyst or some other subcutaneous mass.  Less likely to be a lipoma based on its hardness.  Will evaluate first with an x-ray of her lumbar spine to determine if it is something osseous that can be picked up.  Due to her inability to bear weight we will also get x-rays of hips.  Because I do not really think that we will be able to evaluate the mass completely via x-ray would also like to get an ultrasound of the mass specifically.  However if it does show anything on the x-ray we could always forego the ultrasound.    Mabel Wyman,      I spent a total of 20 minutes on the date of the service which included preparing to see the patient, face-to-face patient care, completing clinical documentation, performing a medically appropriate examination, counseling and educating the patient/family/caregiver and ordering medications, tests, or procedures.

## 2024-04-10 NOTE — LETTER
April 12, 2024    Cassia VELEZ Herendeen  [address] [place] [address]      Dear Ms. Herendeen:    ***    If you have any questions or concerns, please don't hesitate to call.    Sincerely,      We have been unable to contact you by phone regarding a follow up appointment.  Please call the office at [phone] so we can assist you in getting scheduled.        [id number]        CC: No Recipients

## 2024-04-10 NOTE — TELEPHONE ENCOUNTER
Karen from [facility], OT is calling in saying she did a reassessment on pt and would like to continue OT for 1 a week for 5 weeks. Please advise

## 2024-04-10 NOTE — TELEPHONE ENCOUNTER
Unable to leave a voicemail - would not go to voicemail to be able to give a verbal. Will try again tomorrow.

## 2024-04-11 ENCOUNTER — TELEPHONE (OUTPATIENT)
Dept: PRIMARY CARE | Facility: CLINIC | Age: 89
End: 2024-04-11
Payer: MEDICARE

## 2024-04-11 NOTE — TELEPHONE ENCOUNTER
Pt's daughter, Charlette, left a msg wanting to know more information about the US MSK lower extremity joints tendons muscles.  She also is looking for the results of the x-rays, and mentioned they didn't schedule the US for the back yet.

## 2024-04-12 ENCOUNTER — APPOINTMENT (OUTPATIENT)
Dept: RADIOLOGY | Facility: CLINIC | Age: 89
End: 2024-04-12
Payer: MEDICARE

## 2024-04-12 NOTE — TELEPHONE ENCOUNTER
----- Message from Mabel Wyman DO sent at 4/12/2024  8:12 AM EDT -----  Please call patient with the following results:    Xray did not show anything outside of severe degeneration of the lumbar spine; no fractures seen in the hips bilaterally.   I would recommend scheduling the ultrasound.     Berta - can you call the patient to help schedule the ultrasound.

## 2024-04-15 ENCOUNTER — HOSPITAL ENCOUNTER (OUTPATIENT)
Dept: RADIOLOGY | Facility: CLINIC | Age: 89
Discharge: HOME | End: 2024-04-15
Payer: MEDICARE

## 2024-04-15 ENCOUNTER — OFFICE VISIT (OUTPATIENT)
Dept: ORTHOPEDIC SURGERY | Facility: CLINIC | Age: 89
End: 2024-04-15
Payer: MEDICARE

## 2024-04-15 VITALS — HEIGHT: 61 IN | BODY MASS INDEX: 23.22 KG/M2 | WEIGHT: 123 LBS

## 2024-04-15 DIAGNOSIS — S32.402D CLOSED NONDISPLACED FRACTURE OF LEFT ACETABULUM WITH ROUTINE HEALING, UNSPECIFIED PORTION OF ACETABULUM, SUBSEQUENT ENCOUNTER: ICD-10-CM

## 2024-04-15 DIAGNOSIS — M54.31 RIGHT SIDED SCIATICA: Primary | ICD-10-CM

## 2024-04-15 PROCEDURE — 72170 X-RAY EXAM OF PELVIS: CPT

## 2024-04-15 PROCEDURE — 1160F RVW MEDS BY RX/DR IN RCRD: CPT

## 2024-04-15 PROCEDURE — 99214 OFFICE O/P EST MOD 30 MIN: CPT

## 2024-04-15 PROCEDURE — 1157F ADVNC CARE PLAN IN RCRD: CPT

## 2024-04-15 PROCEDURE — 99214 OFFICE O/P EST MOD 30 MIN: CPT | Mod: 57

## 2024-04-15 PROCEDURE — 72170 X-RAY EXAM OF PELVIS: CPT | Performed by: RADIOLOGY

## 2024-04-15 PROCEDURE — 1159F MED LIST DOCD IN RCRD: CPT

## 2024-04-15 PROCEDURE — 1036F TOBACCO NON-USER: CPT

## 2024-04-15 PROCEDURE — 1125F AMNT PAIN NOTED PAIN PRSNT: CPT

## 2024-04-15 ASSESSMENT — PAIN SCALES - GENERAL: PAINLEVEL_OUTOF10: 8

## 2024-04-15 ASSESSMENT — PAIN DESCRIPTION - DESCRIPTORS: DESCRIPTORS: SHARP

## 2024-04-15 ASSESSMENT — PAIN - FUNCTIONAL ASSESSMENT: PAIN_FUNCTIONAL_ASSESSMENT: 0-10

## 2024-04-15 NOTE — PROGRESS NOTES
"Subjective    Patient ID: Cassia Humphries is a 89 y.o. female.    Chief Complaint: Follow-up of the Left Hip     HPI  Patient is a 89 y/o female who presents for follow up of closed treatment of left acetabulum fracture from 12/20/2023.  Patient states that she still has some pain in the groin and side of the left hip. She was improving with physical therapy, but then recently started having right sided leg pain that is worse when trying to elevate her right leg. States that it starts from her back and shoots sharp \"lightning like\" pain down her whole leg. The patient has seen a spine practitioner in the past before for degenerative changes and sciatic pain.The patient denies recent injury or trauma to the left or right hip,  denies locking or catching, denies fever/chills, N/T or calf pain. The patient has not had any treatment for this pain other than physical therapy.    ROS: All other systems have been reviewed and are negative except as previously noted in history of present illness.     Objective   Gen: The patient is alert and oriented ×3, is in no acute distress, and appear their stated age and weight.  Psychiatric: Mood and affect are appropriate.  Eyes: Sclera are white, and pupils are round and symmetric.  ENT: Mucous membranes are moist.   Neck: Supple. Thyroid is midline.  Respiratory: Respirations are nonlabored, chest rise is symmetric.  Cardiac: Rate is regular by palpation of distal pulses.   Abdomen: Nondistended.  Integument: No obvious cutaneous lesions are noted. No signs of lymphangitis. No signs of systemic edema.     bilateral hip examination reveals range of motion is unable to be performed due to pain. Anterior impingement sign is positive. Posterior impingement sign is negative. Subspine impingement sign is negative. There is tenderness to palpation over the anterior groin. There is not tenderness to palpation over the pubic symphysis, greater trochanter, or gluteal region. Straight leg " "Raise positive on the right leg. Hip flexion strength is 5 out of 5. Adductor strength is 5 out of 5. Abduction strength is 5 out of 5 in the lateral position. Pelvic obliquity is level.    Distally, ankle dorsiflexion and plantarflexion as well as great toe extension is 5 out of 5. Sensation is intact to light touch in the tibial, sural, saphenous, superficial peroneal, and deep peroneal nerve distributions. The foot is warm and well-perfused with palpable pedal pulses. There is no obvious edema present. Skin is warm, dry and intact.     X-ray: Images of pelvis reviewed personally by me today and reveal healed left acetabulum fracture. Bilateral mild osteoarthritis of the hips. Lumbar degenerative changes seen.       Assessment/Plan   Encounter Diagnoses:  Right sided sciatica    Closed nondisplaced fracture of left acetabulum with routine healing, unspecified portion of acetabulum, subsequent encounter    Plan: Patient is a 89 yo female who presents for closed treatment of left acetabulum fracture from 12/20/2023. Patient was doing better with physical therapy, but is still having left hip pain in the groin and on the side of the hip. She just recently started to have right sided sharp, \"lightning like\" pain that goes from the back down the whole leg. This does not allow the patient to be able to  the right leg. The patient had a spine practitioner before who has treated the patient for degenerative changes and sciatica. Exam reveals tenderness to the anterior groin of bilateral hips. She is straight leg raise positive of the right leg. Unable to evaluate ROM of the hips due to pain. Imaging reveals a healed left acetabulum fracture and bilateral mild osteoarthritis of the hips. As well, there is lumbar spine degenerative changes. Patient is educated that she has degenerative changes of the lumbar spine that is pinching a nerve and causing sciatica of the right leg. Patient is offered gabapentin, but has " tried it before and did not work. She was given a referral to Dr. Shannan Smith to be evaluated and treated for right sciatica pain. As for her left acetabulum fracture, imaging reveals that the fracture is healed. It also shows that she has bilateral mild osteoarthritis of the hip. This is what is causing the continued hip pain. Patient is offered an ultra sounded corticosteroid injection that will be done with another provider that will help to reduce the inflammation and pain in the hip. Patient is interested in the injection. Patient should continue working with physical therapy on ROM, gait training, quad strengthening, and weight bearing. She may perform exercises as tolerated given right sciatica pain. May reduce physical therapy as needed given pain. Patient will follow up in 3 months. Patient is in agreement with this plan.     Orders Placed This Encounter    XR pelvis 1-2 views    Referral to Orthopaedic Surgery     No follow-ups on file.

## 2024-04-16 ENCOUNTER — HOSPITAL ENCOUNTER (OUTPATIENT)
Dept: RADIOLOGY | Facility: CLINIC | Age: 89
Discharge: HOME | End: 2024-04-16
Payer: MEDICARE

## 2024-04-16 ENCOUNTER — APPOINTMENT (OUTPATIENT)
Dept: RADIOLOGY | Facility: CLINIC | Age: 89
End: 2024-04-16
Payer: MEDICARE

## 2024-04-16 DIAGNOSIS — R22.2 MASS ON BACK: ICD-10-CM

## 2024-04-16 PROCEDURE — 76705 ECHO EXAM OF ABDOMEN: CPT | Performed by: RADIOLOGY

## 2024-04-16 PROCEDURE — 76705 ECHO EXAM OF ABDOMEN: CPT

## 2024-04-16 NOTE — TELEPHONE ENCOUNTER
Nicole from [facility] is calling saying that therapy is currently in with pt but they would like to start skilled nursing again because pt is complaining about a lump on her back and saying there is an increase pain/pinch nerve. Please advise

## 2024-04-17 ENCOUNTER — TELEPHONE (OUTPATIENT)
Dept: PRIMARY CARE | Facility: CLINIC | Age: 89
End: 2024-04-17
Payer: MEDICARE

## 2024-04-17 NOTE — TELEPHONE ENCOUNTER
Charlette, pt's daughter is calling. She is saying she did the US for her Lump yesterday and are asking for next steps.    She also mention pt went to Orthopaedic and did in fact say she has a nerve impingement. Pt would benefit for continuing PT. Her insurance approved 2 and denied 8. They stated if you would send [facility] fast appeal explaining why this patient would benefit from having more PT.     FAX: [phone]  PHONE: [phone]

## 2024-04-17 NOTE — TELEPHONE ENCOUNTER
LM on  voicemail regarding this information for Nicole. She should call if she has any questions.   Xray at bedside.

## 2024-04-18 ENCOUNTER — TELEPHONE (OUTPATIENT)
Dept: PRIMARY CARE | Facility: CLINIC | Age: 89
End: 2024-04-18
Payer: MEDICARE

## 2024-04-18 NOTE — TELEPHONE ENCOUNTER
I have also faxed a letter; I didn' tsee that one was already faxed.   Discussed results of ultrasound with patient's dtrCharlette

## 2024-04-18 NOTE — TELEPHONE ENCOUNTER
[facility] and got a success confirmation that they received it.

## 2024-04-18 NOTE — TELEPHONE ENCOUNTER
NEAL Julian [facility], left a msg to let you know that the pt's daughter is refusing home care nursing and education for her mother.

## 2024-04-19 NOTE — TELEPHONE ENCOUNTER
Pt's daughter, is not refusing it. She said that Iesha said more than likely won't be able to get anymore appointments approved since there is not any open wounds to treat.

## 2024-04-22 ENCOUNTER — APPOINTMENT (OUTPATIENT)
Dept: ORTHOPEDIC SURGERY | Facility: CLINIC | Age: 89
End: 2024-04-22
Payer: MEDICARE

## 2024-05-03 ENCOUNTER — PATIENT OUTREACH (OUTPATIENT)
Dept: CARE COORDINATION | Facility: CLINIC | Age: 89
End: 2024-05-03
Payer: MEDICARE

## 2024-05-03 NOTE — PROGRESS NOTES
Patient has met target of no readmission for (90) days post hospital discharge and is graduated from Transitional Care Management program at this time.  Devaughn Chan LPN

## 2024-05-06 ENCOUNTER — OFFICE VISIT (OUTPATIENT)
Dept: ORTHOPEDIC SURGERY | Facility: CLINIC | Age: 89
End: 2024-05-06
Payer: MEDICARE

## 2024-05-06 VITALS — WEIGHT: 123 LBS | HEIGHT: 61 IN | BODY MASS INDEX: 23.22 KG/M2

## 2024-05-06 DIAGNOSIS — R22.2 PALPABLE MASS OF LOWER BACK: Primary | ICD-10-CM

## 2024-05-06 DIAGNOSIS — M54.31 RIGHT SIDED SCIATICA: ICD-10-CM

## 2024-05-06 PROCEDURE — 99214 OFFICE O/P EST MOD 30 MIN: CPT | Performed by: ORTHOPAEDIC SURGERY

## 2024-05-06 PROCEDURE — 1160F RVW MEDS BY RX/DR IN RCRD: CPT | Performed by: ORTHOPAEDIC SURGERY

## 2024-05-06 PROCEDURE — 1125F AMNT PAIN NOTED PAIN PRSNT: CPT | Performed by: ORTHOPAEDIC SURGERY

## 2024-05-06 PROCEDURE — 1157F ADVNC CARE PLAN IN RCRD: CPT | Performed by: ORTHOPAEDIC SURGERY

## 2024-05-06 PROCEDURE — 1159F MED LIST DOCD IN RCRD: CPT | Performed by: ORTHOPAEDIC SURGERY

## 2024-05-06 ASSESSMENT — PAIN DESCRIPTION - DESCRIPTORS: DESCRIPTORS: ACHING

## 2024-05-06 ASSESSMENT — PAIN SCALES - GENERAL: PAINLEVEL_OUTOF10: 3

## 2024-05-06 ASSESSMENT — PAIN - FUNCTIONAL ASSESSMENT: PAIN_FUNCTIONAL_ASSESSMENT: 0-10

## 2024-05-06 NOTE — PROGRESS NOTES
Chief Complaint: Low back pain and right leg pain and mass in the left lower back    HPI: Cassia Humphries is a 89 y.o. year old female patient who I have previously seen back in 2019 for odontoid fracture that was treated closed.  She is here today with her daughter with complaints of lower back pain and right radicular type leg pain as well as a palpable mass in her left lower back.  She states that the back and leg pain started about 5 weeks ago.  She has had episodes of falls last 1 was in December when she fell in the nursing home and sustained a left pelvic fracture that was treated closed.  She is now back at home.  Complains of lower back pain on the right lower back that radiates down into her right leg both lateral and medial thigh and into the her calf.  Nothing on the left side.  She denies any bowel or bladder symptoms.  She does have pretty severe osteoarthritis in bilateral knees as well with some mild knee flexion contractures.  Her daughter has been doing therapy with her and stretches at home.  She walks with a walker.    In terms of the mass on the left lower back they noticed it maybe about 2 months ago.  It is very tender to touch she was unable to lie on her back because of this.  According to the daughter she thinks it might have gotten smaller.  She has had ultrasound of this mass which radiology recommended an MRI which she has not gotten yet.  No history of malignancy that she is aware of.  She is on anticoagulation due to history of PE    In terms of her neck occasionally she will have some neck pain but not too bad that it bothers her.    She is on anticoagulation for her history of PE she is on Eliquis.  Review of Systems    All other systems have been reviewed and are negative for complaint. All pertinent positive and negative as listed in history of present illness.    Past Medical History:   Diagnosis Date    Anterior displaced type ii dens fracture, sequela 06/25/2019    Odontoid  fracture, sequela    Encounter for general adult medical examination without abnormal findings 07/19/2019    Medicare annual wellness visit, initial    Hemangioma of skin and subcutaneous tissue 03/29/2022    Localized edema 05/28/2020    Bilateral edema of lower extremity    Multiple fractures of pelvis with stable disruption of pelvic ring, initial encounter for closed fracture (Multi) 04/01/2019    Closed pelvic ring fracture, initial encounter    Pain in left shoulder 01/08/2019    Left shoulder pain    Personal history of other diseases of the musculoskeletal system and connective tissue 07/19/2019    History of low back pain    Personal history of other diseases of the musculoskeletal system and connective tissue 06/24/2019    History of neck pain    Primary osteoarthritis, right shoulder 07/19/2019    DJD of right shoulder    Segmental and somatic dysfunction of abdomen and other regions 11/03/2017    Somatic dysfunction of back    Squamous cell carcinoma of skin of other parts of face 03/29/2022    Stiffness of left shoulder, not elsewhere classified 01/08/2019    Stiffness of left shoulder, not elsewhere classified    Syncope 10/30/2023    SYNCOPE    Unilateral primary osteoarthritis, right knee 08/14/2017    Right knee DJD    Unspecified displaced fracture of second cervical vertebra, initial encounter for closed fracture (Multi) 03/01/2019    Closed odontoid fracture, initial encounter    Unspecified displaced fracture of second cervical vertebra, subsequent encounter for fracture with routine healing 07/19/2019    Closed odontoid fracture with routine healing, subsequent encounter    Unspecified symptoms and signs involving the genitourinary system     UTI symptoms    Urinary tract infection, site not specified     Frequent UTI        Past Surgical History:   Procedure Laterality Date    CT ABDOMEN PELVIS ANGIOGRAM W AND/OR WO IV CONTRAST  12/21/2023    CT ABDOMEN PELVIS ANGIOGRAM W AND/OR WO IV CONTRAST  12/21/2023 [facility] CT    CT ANGIO NECK  1/24/2019    CT NECK ANGIO W AND WO IV CONTRAST 1/24/2019 [facility] CLINICAL LEGACY    MR HEAD ANGIO WO IV CONTRAST  8/13/2016    MR HEAD ANGIO WO IV CONTRAST 8/13/2016 [facility] EMERGENCY LEGACY    MR NECK ANGIO WO IV CONTRAST  8/13/2016    MR NECK ANGIO WO IV CONTRAST 8/13/2016 [facility] EMERGENCY LEGACY    OTHER SURGICAL HISTORY  07/01/2019    No history of surgery        Allergies   Allergen Reactions    Lorazepam Unknown        Current Outpatient Medications on File Prior to Visit   Medication Sig Dispense Refill    acetaminophen (Tylenol Arthritis Pain) 650 mg ER tablet Take 1 tablet (650 mg) by mouth every 8 hours if needed for mild pain (1 - 3). Do not crush, chew, or split.      acetaminophen (Tylenol Extra Strength) 500 mg tablet Take by mouth every 6 hours if needed for mild pain (1 - 3).      apixaban (Eliquis) 5 mg tablet Take 1 tablet (5 mg) by mouth 2 times a day. 180 tablet 0    ascorbic acid (Vitamin C) 1,000 mg tablet Take 1 capsule by mouth once daily.      cholecalciferol (Vitamin D-3) 25 MCG (1000 UT) capsule Take 1 capsule (25 mcg) by mouth once daily.      cyanocobalamin (Vitamin B-12) 500 mcg tablet Take 1 tablet (500 mcg) by mouth once daily.      docusate sodium (Colace) 100 mg capsule Take 1 capsule (100 mg) by mouth once daily in the morning.      lactobacillus acidophilus & bulgar (Lactinex) 1 million cell chewable tablet Chew 1 tablet 3 times a day with meals.      lidocaine 4 % patch Place 1 patch on the skin once daily. Remove & discard patch within 12 hours or as directed by MD. Apply to back      multivit-min/ferrous fumarate (MULTI VITAMIN ORAL) Take by mouth once daily. Without iron      peg 400-propylene glycol (Systane, propylene glycoL,) 0.4-0.3 % drops ophthalmic drops Administer 1 drop into both eyes 2 times a day as needed (dry eyes).       No current facility-administered medications on file prior to visit.          PE:   General: Patient appears  well-nourished and well-developed in no acute distress, Alert and Oriented x3  Psych: Pleasant mood and affect  HEENT: Extraocular muscles intact, pupils equal and round. Sclerae anicteric   Cardio: extremities warm and well perfused  Resp: unlabored symmetric breathing  Skin: no open wounds or rash  Musculoskeletal/Neuro Exam: Sitting in a wheelchair.  No tenderness to palpation along the midline lumbar or paraspinal.  However there is a golf ball sized palpable hard mass on her left lower back around the SI joint region that is tender to touch..  She has tight hamstrings bilaterally with contractures of her knees.  She has a lot of pain in her knee with attempted extension of bilateral knees.      Lower extremity    Motor: Right leg with 4 out of 5 motor strength with hip flexion, knee extension, ankle dorsiflexion plantarflexion and EHL against resistance all seem to be limited by pain.  Left leg with 4 out of 5 motor strength with hip flexion, knee extension, ankle dorsiflexion plantarflexion EHL against resistance limited by pain  Sensation to light touch intact along L2 to S1 distribution bilaterally          Imaging:    She has ultrasound of her lower back mass on April 4, 2017 2024 suggestive of solid-appearing mass with intrinsic blood flow unable to rule out neoplastic lesion    I also reviewed x-rays of the lumbar spine from April 10, 2024 AP lateral oblique views.  No acute fracture but multilevel spondylotic changes.    She has CT pelvis from December 21 which shows left acetabular fracture    She had CT scan of her cervical spine from August 30, 2023 which shows a healed odontoid fracture          A/P: Cassia Humphries is a 89 y.o. year old female patient who I previously seen for odontoid fracture and is now here for new low back pain and right radicular type leg pain as well as a left-sided lower back mass.  X-rays of her lower back shows multilevel degenerative changes.  She might have lumbar  stenosis causing her radicular symptoms.  She has been doing therapy at home.  She also noticed that his left sided lower back mass that started 2 months ago.  Ultrasound suggestive of some blood flow within the mass and recommended MRI.  I recommend she get the MRI of the lumbar spine with and without contrast to further evaluate this mass.  Differential includes malignancy versus calcified hematoma given that it is in the same area as her pelvic fracture.  She will see me back or call me once his MRI is done so that I can review the images.  In the meantime she can continue with her home therapy. After our discussion, the patient articulated understanding of the plan and felt that all questions had been answered satisfactorily. The patient was pleased with the visit and very appreciative for the care rendered.    **Please excuse any errors in grammar or translation related to this dictation. Voice recognition software was utilized to prepare this document. **                Shannan Smith MD    Department of Orthopaedic Surgery  [facility]  [email or web address]

## 2024-05-23 ENCOUNTER — TELEPHONE (OUTPATIENT)
Dept: PRIMARY CARE | Facility: CLINIC | Age: 89
End: 2024-05-23
Payer: MEDICARE

## 2024-05-23 NOTE — TELEPHONE ENCOUNTER
[facility] OCCUPATIONAL THERAPIST CALLED AND LEFT VOICEMAIL MESSAGE STATING FYI ON PT    ON 05/17/24 PT HAD MISSED VISIT BECAUSE OF A SCHEDULE CONFLICT VISIT WAS RESCHEDULED    ON 04/19 AND 04/26 AND 05/22 PT REPORTED HIGH PAIN LEVELS 7 TO 10 IN PAIN LEVELS  ON 04/26 AND 05/20 LOW BLOOD PRESSURE READINGS  04/26 BP = 90/53 AND 05/20 BP = 120/59  ON 05/20 HAD A LOW HEART RATE = 54    CALL WITH ANY QUESTIONS -[phone]

## 2024-05-31 NOTE — TELEPHONE ENCOUNTER
Misti- speech therapist would like to recertifate pt starting on 06/02/24. She had an appointment scheduled today but needs to reschedule because there was a conflict of appointments. Would you give verbal?

## 2024-06-03 NOTE — TELEPHONE ENCOUNTER
Karen from OT is calling saying patient would like to be discharged from OT prematurely- she would like to continue with PT only. Would you give a verbal for her to be discharged from OT?    #[phone]

## 2024-06-04 NOTE — TELEPHONE ENCOUNTER
Misti- Speech therapist for [facility] did a recertificate exam- she would like to do 1 time a week for 8 weeks for enhancing cognitive function. - would you give a verbal?

## 2024-06-25 ENCOUNTER — TELEPHONE (OUTPATIENT)
Dept: PRIMARY CARE | Facility: CLINIC | Age: 89
End: 2024-06-25
Payer: MEDICARE

## 2024-06-25 NOTE — TELEPHONE ENCOUNTER
Charlette is calling In regarding patient's PT- her insurance is stating they are not approving more PT. Pt's daughter believes she needs more PT and needs Lizbeth to say she is needing more PT.

## 2024-06-25 NOTE — TELEPHONE ENCOUNTER
Pt is scheduled 07/22/24- Charlette said the appeal will be over if there was sooner appts to get her scheduled. *you do not have anything.

## 2024-07-03 ENCOUNTER — LAB (OUTPATIENT)
Dept: LAB | Facility: LAB | Age: 89
End: 2024-07-03
Payer: MEDICARE

## 2024-07-03 ENCOUNTER — APPOINTMENT (OUTPATIENT)
Dept: PRIMARY CARE | Facility: CLINIC | Age: 89
End: 2024-07-03
Payer: MEDICARE

## 2024-07-03 VITALS
OXYGEN SATURATION: 98 % | TEMPERATURE: 97.3 F | DIASTOLIC BLOOD PRESSURE: 73 MMHG | HEART RATE: 74 BPM | SYSTOLIC BLOOD PRESSURE: 121 MMHG

## 2024-07-03 DIAGNOSIS — I26.09 OTHER PULMONARY EMBOLISM WITH ACUTE COR PULMONALE, UNSPECIFIED CHRONICITY (MULTI): ICD-10-CM

## 2024-07-03 DIAGNOSIS — I10 BENIGN ESSENTIAL HYPERTENSION: ICD-10-CM

## 2024-07-03 DIAGNOSIS — D64.9 ANEMIA, UNSPECIFIED TYPE: ICD-10-CM

## 2024-07-03 DIAGNOSIS — I26.99 PULMONARY EMBOLISM, UNSPECIFIED CHRONICITY, UNSPECIFIED PULMONARY EMBOLISM TYPE, UNSPECIFIED WHETHER ACUTE COR PULMONALE PRESENT (MULTI): ICD-10-CM

## 2024-07-03 DIAGNOSIS — R26.81 UNSTEADY GAIT: ICD-10-CM

## 2024-07-03 DIAGNOSIS — N18.31 STAGE 3A CHRONIC KIDNEY DISEASE (MULTI): ICD-10-CM

## 2024-07-03 DIAGNOSIS — E87.1 HYPONATREMIA: ICD-10-CM

## 2024-07-03 DIAGNOSIS — I48.91 ATRIAL FIBRILLATION, UNSPECIFIED TYPE (MULTI): ICD-10-CM

## 2024-07-03 DIAGNOSIS — H35.3232 EXUDATIVE AGE-RELATED MACULAR DEGENERATION, BILATERAL, WITH INACTIVE CHOROIDAL NEOVASCULARIZATION (MULTI): ICD-10-CM

## 2024-07-03 DIAGNOSIS — Z00.00 MEDICARE ANNUAL WELLNESS VISIT, SUBSEQUENT: Primary | ICD-10-CM

## 2024-07-03 PROBLEM — L03.115 CELLULITIS OF RIGHT FOOT: Status: RESOLVED | Noted: 2024-03-08 | Resolved: 2024-07-03

## 2024-07-03 LAB
ANION GAP SERPL CALC-SCNC: 12 MMOL/L (ref 10–20)
BUN SERPL-MCNC: 25 MG/DL (ref 6–23)
CALCIUM SERPL-MCNC: 9.7 MG/DL (ref 8.6–10.3)
CHLORIDE SERPL-SCNC: 102 MMOL/L (ref 98–107)
CO2 SERPL-SCNC: 27 MMOL/L (ref 21–32)
CREAT SERPL-MCNC: 0.88 MG/DL (ref 0.5–1.05)
EGFRCR SERPLBLD CKD-EPI 2021: 62 ML/MIN/1.73M*2
ERYTHROCYTE [DISTWIDTH] IN BLOOD BY AUTOMATED COUNT: 14.9 % (ref 11.5–14.5)
GLUCOSE SERPL-MCNC: 92 MG/DL (ref 74–99)
HCT VFR BLD AUTO: 40.5 % (ref 36–46)
HGB BLD-MCNC: 13 G/DL (ref 12–16)
MCH RBC QN AUTO: 32.5 PG (ref 26–34)
MCHC RBC AUTO-ENTMCNC: 32.1 G/DL (ref 32–36)
MCV RBC AUTO: 101 FL (ref 80–100)
NRBC BLD-RTO: 0 /100 WBCS (ref 0–0)
PLATELET # BLD AUTO: 325 X10*3/UL (ref 150–450)
POTASSIUM SERPL-SCNC: 4.5 MMOL/L (ref 3.5–5.3)
RBC # BLD AUTO: 4 X10*6/UL (ref 4–5.2)
SODIUM SERPL-SCNC: 136 MMOL/L (ref 136–145)
WBC # BLD AUTO: 6.3 X10*3/UL (ref 4.4–11.3)

## 2024-07-03 PROCEDURE — 1170F FXNL STATUS ASSESSED: CPT | Performed by: NURSE PRACTITIONER

## 2024-07-03 PROCEDURE — 3078F DIAST BP <80 MM HG: CPT | Performed by: NURSE PRACTITIONER

## 2024-07-03 PROCEDURE — 36415 COLL VENOUS BLD VENIPUNCTURE: CPT

## 2024-07-03 PROCEDURE — 1157F ADVNC CARE PLAN IN RCRD: CPT | Performed by: NURSE PRACTITIONER

## 2024-07-03 PROCEDURE — G0439 PPPS, SUBSEQ VISIT: HCPCS | Performed by: NURSE PRACTITIONER

## 2024-07-03 PROCEDURE — 1160F RVW MEDS BY RX/DR IN RCRD: CPT | Performed by: NURSE PRACTITIONER

## 2024-07-03 PROCEDURE — 1036F TOBACCO NON-USER: CPT | Performed by: NURSE PRACTITIONER

## 2024-07-03 PROCEDURE — 1159F MED LIST DOCD IN RCRD: CPT | Performed by: NURSE PRACTITIONER

## 2024-07-03 PROCEDURE — 1123F ACP DISCUSS/DSCN MKR DOCD: CPT | Performed by: NURSE PRACTITIONER

## 2024-07-03 PROCEDURE — 80048 BASIC METABOLIC PNL TOTAL CA: CPT

## 2024-07-03 PROCEDURE — 1158F ADVNC CARE PLAN TLK DOCD: CPT | Performed by: NURSE PRACTITIONER

## 2024-07-03 PROCEDURE — 85027 COMPLETE CBC AUTOMATED: CPT

## 2024-07-03 PROCEDURE — 3074F SYST BP LT 130 MM HG: CPT | Performed by: NURSE PRACTITIONER

## 2024-07-03 PROCEDURE — 99214 OFFICE O/P EST MOD 30 MIN: CPT | Performed by: NURSE PRACTITIONER

## 2024-07-03 RX ORDER — ERYTHROMYCIN 5 MG/G
OINTMENT OPHTHALMIC AS NEEDED
COMMUNITY
Start: 2024-06-11

## 2024-07-03 ASSESSMENT — ACTIVITIES OF DAILY LIVING (ADL)
DRESSING: NEEDS ASSISTANCE
TAKING_MEDICATION: NEEDS ASSISTANCE
MANAGING_FINANCES: TOTAL CARE
DOING_HOUSEWORK: TOTAL CARE
BATHING: NEEDS ASSISTANCE
GROCERY_SHOPPING: TOTAL CARE

## 2024-07-03 ASSESSMENT — ENCOUNTER SYMPTOMS
WHEEZING: 0
FEVER: 0
VOMITING: 0
CHILLS: 0
CHEST TIGHTNESS: 0
FATIGUE: 1
NAUSEA: 0
LOSS OF SENSATION IN FEET: 0
DIZZINESS: 0
ARTHRALGIAS: 1
SHORTNESS OF BREATH: 1
NUMBNESS: 0
BACK PAIN: 1
OCCASIONAL FEELINGS OF UNSTEADINESS: 1
ABDOMINAL PAIN: 1
HEADACHES: 0
WEAKNESS: 0
DIARRHEA: 1
CONSTIPATION: 1
COUGH: 0
PALPITATIONS: 0
DEPRESSION: 0

## 2024-07-03 ASSESSMENT — PATIENT HEALTH QUESTIONNAIRE - PHQ9
2. FEELING DOWN, DEPRESSED OR HOPELESS: NOT AT ALL
1. LITTLE INTEREST OR PLEASURE IN DOING THINGS: NOT AT ALL
SUM OF ALL RESPONSES TO PHQ9 QUESTIONS 1 AND 2: 0

## 2024-07-03 NOTE — PROGRESS NOTES
Subjective   Reason for Visit: Cassia Humphries is an 89 y.o. female here for a Medicare Wellness visit.     Past Medical, Surgical, and Family History reviewed and updated in chart.    Reviewed all medications by prescribing practitioner or clinical pharmacist (such as prescriptions, OTCs, herbal therapies and supplements) and documented in the medical record.    She has a living will and HCPOA. In the event she cannot make decisions on her own she would want  her daughter Charlette Humphries to make decisions for her.  DNRCCA reviewed and on file.    HPI  She presents to the office today with her daughter Charlette for AWV and follow up.  She reports she is having pain in the back and both knees today.  She is tolerating medication well at current dose- no side effects. No chest pain, palpitations or edema. (+) SOB at times with exertion No headaches, numbness, tingling, weakness or vision changes.  No dizziness.  Last eye exam: Goes regularly  Diet: Healthy  Exercise: Therapy exercises everyday      She relies on someone to get her to all her visits as she does not drive and also needs assistance with transfers.  She has been using her walker to get around the house and has been doing well. Daughter reports she has improved but she is not very stable with standing and unstable with walking.   Still requires a gait belt for all transfers.  Uses a walker but currently is still needing someone there 24/7 to assist. A year ago was ambulating on her own with walker.  She is seeing Cognitive speech therapist which has been very helpful with cognition.  She has severe arthritis in back, bilateral shoulders and bilateral knees and is not a candidate for surgery. Has done most injections at this point.   Working on building muscle to keep her moving.    She is also a fall risk. She is on blood thinners due to prior PE and Atrial fibrillation.    The daughter discussed the lump to the sacrum that was noted a couple of months ago.   MRI was recommended after the US. The patient reports she would not want any treatment if the lump were to be cancer. Laying flat for an MRI is also challenging given her arthritis.. She reports she would rather not look into any further.    She is following with podiatry.    Patient Care Team:  HELEN Noriega-MAHAD as PCP - General  Jenifer Noonan MD as PCP - [employer] PCP     Review of Systems   Constitutional:  Positive for fatigue. Negative for chills and fever.   Eyes:  Negative for visual disturbance.   Respiratory:  Positive for shortness of breath. Negative for cough, chest tightness and wheezing.    Cardiovascular:  Negative for chest pain, palpitations and leg swelling.   Gastrointestinal:  Positive for abdominal pain, constipation and diarrhea. Negative for nausea and vomiting.   Musculoskeletal:  Positive for arthralgias and back pain.   Neurological:  Negative for dizziness, weakness, numbness and headaches.     Objective   Vitals:  /73 (BP Location: Left arm, Patient Position: Sitting, BP Cuff Size: Adult)   Pulse 74   Temp 36.3 °C (97.3 °F) (Temporal)   SpO2 98%       Physical Exam  Constitutional:       General: She is not in acute distress.     Appearance: Normal appearance. She is not toxic-appearing.   Eyes:      Extraocular Movements: Extraocular movements intact.      Conjunctiva/sclera: Conjunctivae normal.      Pupils: Pupils are equal, round, and reactive to light.   Cardiovascular:      Rate and Rhythm: Normal rate and regular rhythm.      Pulses: Normal pulses.      Heart sounds: Normal heart sounds, S1 normal and S2 normal. No murmur heard.  Pulmonary:      Effort: Pulmonary effort is normal. No respiratory distress.      Breath sounds: Normal breath sounds.   Abdominal:      General: Bowel sounds are normal.      Palpations: Abdomen is soft.      Tenderness: There is no abdominal tenderness.   Musculoskeletal:      Right lower leg: No edema.      Left  lower leg: No edema.   Lymphadenopathy:      Cervical: No cervical adenopathy.   Neurological:      Mental Status: She is alert and oriented to person, place, and time.   Psychiatric:         Attention and Perception: Attention normal.         Mood and Affect: Mood and affect normal.         Behavior: Behavior normal. Behavior is cooperative.         Thought Content: Thought content normal.         Cognition and Memory: Cognition normal.         Judgment: Judgment normal.     Assessment/Plan   Problem List Items Addressed This Visit       Anemia    Current Assessment & Plan     Check updated CBC.         Relevant Orders    CBC (Completed)    Benign essential hypertension    Current Assessment & Plan     Well controlled. Currently off medications.         Hyponatremia    Current Assessment & Plan     Check updated labs today.         Relevant Orders    Basic Metabolic Panel (Completed)    Medicare annual wellness visit, subsequent - Primary    Current Assessment & Plan     UTD on age appropriate screenings and vaccines.         Other pulmonary embolism with acute cor pulmonale, unspecified chronicity (Multi)    Current Assessment & Plan     She is on Eliquis.         Exudative age-related macular degeneration, bilateral, with inactive choroidal neovascularization (Multi)    Current Assessment & Plan     Follows with ophthalmology.         Atrial fibrillation, unspecified type (Multi)    Stage 3a chronic kidney disease (Multi)    Current Assessment & Plan     Stable on recent labs.         Unsteady gait    Current Assessment & Plan     She would benefit from continue home PT services.

## 2024-07-03 NOTE — LETTER
July 3, 2024     Patient: Cassia Humphries   YOB: 1932   Date of Visit: 7/3/2024       To Whom It May Concern:    Cassia Humphries was seen in my clinic on 7/3/2024 at 11:30 am. She would benefit from further home physical therapy and cognitive speech therapy sessions. She is homebound and relies on the assistance of others to get to and from medical appointments. She is still requiring a gait belt and assistance with transfers. She is showing improvement but not stable when up ambulating given severe osteoarthritis in knees and prior left acetabular fracture. She is still requiring a walker and assistance of another person to ambulate.  If you have any questions or concerns, please don't hesitate to call.         Sincerely,         HELEN Noriega-CNP        CC:   No Recipients

## 2024-07-08 ENCOUNTER — TELEPHONE (OUTPATIENT)
Dept: PRIMARY CARE | Facility: CLINIC | Age: 89
End: 2024-07-08
Payer: MEDICARE

## 2024-07-08 NOTE — TELEPHONE ENCOUNTER
----- Message from HELEN Noriega-CNP sent at 7/4/2024 10:23 AM EDT -----  Please let her know her sodium level was good at 136.  BUN was slightly elevated at 25.  She should be sure she is drinking plenty of water.  Anemia has improved.

## 2024-07-08 NOTE — TELEPHONE ENCOUNTER
Talked with pt's daughter (Charlette) about results and she verbalized understanding and had no further questions at this time.    She said she talked to [facility] and said they discharge pt without contacting her on 06/30- it was stated there was no appeal but as far as she knows the appear is still open and pending. She will let us know if there is anything else we have to do.

## 2024-07-11 ENCOUNTER — TELEPHONE (OUTPATIENT)
Dept: PRIMARY CARE | Facility: CLINIC | Age: 89
End: 2024-07-11
Payer: MEDICARE

## 2024-07-11 NOTE — TELEPHONE ENCOUNTER
Charlette pt's daughter calling to let you know the appeal got approved for 10 more apts. They want to know if they can decrease the dose of the eliquis?

## 2024-07-15 ENCOUNTER — APPOINTMENT (OUTPATIENT)
Dept: ORTHOPEDIC SURGERY | Facility: CLINIC | Age: 89
End: 2024-07-15
Payer: MEDICARE

## 2024-07-15 ENCOUNTER — HOSPITAL ENCOUNTER (OUTPATIENT)
Dept: RADIOLOGY | Facility: CLINIC | Age: 89
Discharge: HOME | End: 2024-07-15
Payer: MEDICARE

## 2024-07-15 DIAGNOSIS — S32.402D CLOSED NONDISPLACED FRACTURE OF LEFT ACETABULUM WITH ROUTINE HEALING, UNSPECIFIED PORTION OF ACETABULUM, SUBSEQUENT ENCOUNTER: ICD-10-CM

## 2024-07-15 DIAGNOSIS — M25.561 BILATERAL CHRONIC KNEE PAIN: ICD-10-CM

## 2024-07-15 DIAGNOSIS — M17.0 BILATERAL PRIMARY OSTEOARTHRITIS OF KNEE: ICD-10-CM

## 2024-07-15 DIAGNOSIS — G89.29 BILATERAL CHRONIC KNEE PAIN: ICD-10-CM

## 2024-07-15 DIAGNOSIS — M54.31 RIGHT SIDED SCIATICA: Primary | ICD-10-CM

## 2024-07-15 DIAGNOSIS — M25.562 BILATERAL CHRONIC KNEE PAIN: ICD-10-CM

## 2024-07-15 PROCEDURE — 99214 OFFICE O/P EST MOD 30 MIN: CPT | Performed by: ORTHOPAEDIC SURGERY

## 2024-07-15 PROCEDURE — 72190 X-RAY EXAM OF PELVIS: CPT

## 2024-07-15 PROCEDURE — 1157F ADVNC CARE PLAN IN RCRD: CPT | Performed by: ORTHOPAEDIC SURGERY

## 2024-07-15 PROCEDURE — 72190 X-RAY EXAM OF PELVIS: CPT | Performed by: RADIOLOGY

## 2024-07-16 DIAGNOSIS — I48.91 ATRIAL FIBRILLATION, UNSPECIFIED TYPE (MULTI): ICD-10-CM

## 2024-07-16 DIAGNOSIS — I26.09 OTHER PULMONARY EMBOLISM WITH ACUTE COR PULMONALE, UNSPECIFIED CHRONICITY (MULTI): ICD-10-CM

## 2024-07-16 DIAGNOSIS — I82.90 VTE (VENOUS THROMBOEMBOLISM): Primary | ICD-10-CM

## 2024-07-16 NOTE — TELEPHONE ENCOUNTER
Per HIPAA ok to leave detailed message on pt's daughter voicemail (Charlette), LM on daughters voicemail with this information. She should call back to let us know what pharmacy she would like the new dose of eliquis to be sent to.

## 2024-07-22 ENCOUNTER — APPOINTMENT (OUTPATIENT)
Dept: PRIMARY CARE | Facility: CLINIC | Age: 89
End: 2024-07-22
Payer: MEDICARE

## 2024-07-23 NOTE — TELEPHONE ENCOUNTER
Charlette is calling in saying she has a question about her medication. Pharmacy is charging the same amound for a less dosage and is wondering if you would send in 5mg and she'll do twice a day so it can save them money (about 400 a year)

## 2024-07-25 DIAGNOSIS — I48.91 ATRIAL FIBRILLATION, UNSPECIFIED TYPE (MULTI): Primary | ICD-10-CM

## 2024-07-25 DIAGNOSIS — I26.09 OTHER PULMONARY EMBOLISM WITH ACUTE COR PULMONALE, UNSPECIFIED CHRONICITY (MULTI): ICD-10-CM

## 2024-07-26 NOTE — TELEPHONE ENCOUNTER
Per HIPAA ok to leave detailed message, LM on pt's daughter (Charlette) voicemail with this information. She should call back if she has any questions or concerns.

## 2024-07-26 NOTE — PROGRESS NOTES
Subjective    Patient ID: Cassia Humphries is a 89 y.o. female.    Chief Complaint: Left acetabular fracture, bilateral knee pain and back pain     HPI  Patient is a 89 y/o female who presents for follow up of closed treatment of left acetabulum fracture from 12/20/2023.  P she reported that she no longer have pain in the hand.  However she is complaining of lower back pain and the radicular symptoms and bilateral knee pain.  This chronic issues for her.  Patient have seen Dr. Smith for management of her lumbar radiculopathy.  For her knee  osteoarthritis she has received corticosteroid injection, dealing And Nerve Ablation Therapy without Improvement of Pain.  This Is Baseline Pain for Her.  She Is able to ambulate     ROS: All other systems have been reviewed and are negative except as previously noted in history of present illness.     Objective   Gen: The patient is alert and oriented ×3, is in no acute distress, and appear their stated age and weight.  Psychiatric: Mood and affect are appropriate.  Eyes: Sclera are white, and pupils are round and symmetric.  ENT: Mucous membranes are moist.   Neck: Supple. Thyroid is midline.  Respiratory: Respirations are nonlabored, chest rise is symmetric.  Cardiac: Rate is regular by palpation of distal pulses.   Abdomen: Nondistended.  Integument: No obvious cutaneous lesions are noted. No signs of lymphangitis. No signs of systemic edema.     Left  hip examination reveals range of motion hip without pain.  No tenderness to palpation around the pelvis.      Physical examination of the bilateral knee revealed no effusion in the knee and there were no skin abnormalities or lymphangitis. The knee demonstrated valgus alignment. There was no tenderness about the knee, thigh or calf. There was tenderness at the medial joint space. There was discomfort with patellofemoral compression. The knee came to within 5 degrees of full extension. Straight leg raising was without lag,  flexion was to 120 degrees and ligamentous stability was full. The neurovascular examination extremity was intact.The neurological exam including motor and sensory exam was performed. The vascular examination including palpation of pulses and capillary refill of the foot was performed and determined to be intact.  X-ray: Images of pelvis reviewed personally by me today and reveal healed left acetabulum fracture. Bilateral mild osteoarthritis of the hips. Lumbar degenerative changes seen.   I also reviewed her bilateral knee radiograph there is valgus alignment and severe degenerative changes of the knee and wear of the knee joint.    Assessment/Plan   Encounter Diagnoses:  Right sided sciatica    Closed nondisplaced fracture of left acetabulum with routine healing, unspecified portion of acetabulum, subsequent encounter    Bilateral chronic knee pain    Bilateral primary osteoarthritis of knee    Plan: Patient is a 89 yo female who presents for closed treatment of left acetabulum fracture from 12/20/2023.  Acetabular fracture is healed at this point.  She has pre-existing degenerative changes of the hip.  This is not particularly symptomatic for her.  I recommend continue with conservative treatment.  Range of motion strength exercises.  Weightbearing as tolerated as well as the as I was concerned.  She has lumbar radiculopathy she will continue to follow-up with spine for management.  She has bilateral severe knee degenerative disease.  She has tried multiple conservative treatment.  She is not receiving reasonable pain control at this point.  I discussed with her that arthroplasty is an option however she is not interested at this time given advanced age.  If she would like a repeat corticosteroid injection she may follow-up with me in the future as needed.  Given that her fracture is healed she will follow-up with me only as needed.  She will continue outpatient physical therapy no restrictions at this point.   Discussed fall prevention at home.  Patient and family in agreement with treatment plan.  Orders Placed This Encounter    XR pelvis with judet views     No follow-ups on file.

## 2024-08-05 ENCOUNTER — TELEPHONE (OUTPATIENT)
Dept: PRIMARY CARE | Facility: CLINIC | Age: 89
End: 2024-08-05
Payer: MEDICARE

## 2024-08-05 NOTE — TELEPHONE ENCOUNTER
Manish from [facility] is calling to clear out some documentation. He said that pt missed PT appts on 06/03/24, 06/12/24, 06/14/24 amd 07/16/24. Pt did complain about BLOSSOM knee and low back problems and said pain level 7/10. - this has been addressed already

## 2024-08-12 ENCOUNTER — TELEPHONE (OUTPATIENT)
Dept: PRIMARY CARE | Facility: CLINIC | Age: 89
End: 2024-08-12
Payer: MEDICARE

## 2024-08-12 NOTE — TELEPHONE ENCOUNTER
Pt's daughter is calling saying that her mother was denied of PT visits again. She is doing another appeal and would like you to submit another letter to the appeal fax stating why she would benefit more PT visits.    Charlette also said she was approved previously for 10 visits but only did 8 because the other 2 were out of certification period.     FAX: [phone]

## 2024-08-13 NOTE — TELEPHONE ENCOUNTER
Charlette is calling in again regards of this. She said she went ahead did a voice appeal with [employer] and called [employer] to update their notes. Now jut waiting on your letter to them just like you did in the past for them to be sent.

## 2024-08-28 NOTE — TELEPHONE ENCOUNTER
Debi waller [place] said she received your letter to appeal her PT. She said this has been approved since 08/16 and is wondering if she has your permission to with drawn your letter since this was approved without this.

## 2024-10-04 ENCOUNTER — TELEPHONE (OUTPATIENT)
Dept: PRIMARY CARE | Facility: CLINIC | Age: 89
End: 2024-10-04
Payer: MEDICARE

## 2024-10-04 ENCOUNTER — OFFICE VISIT (OUTPATIENT)
Dept: URGENT CARE | Age: 89
End: 2024-10-04
Payer: MEDICARE

## 2024-10-04 VITALS — TEMPERATURE: 97.6 F | HEART RATE: 97 BPM | OXYGEN SATURATION: 97 %

## 2024-10-04 DIAGNOSIS — N30.00 ACUTE CYSTITIS WITHOUT HEMATURIA: ICD-10-CM

## 2024-10-04 DIAGNOSIS — R35.0 FREQUENT URINATION: Primary | ICD-10-CM

## 2024-10-04 LAB
POC APPEARANCE, URINE: CLEAR
POC BILIRUBIN, URINE: NEGATIVE
POC BLOOD, URINE: NEGATIVE
POC COLOR, URINE: YELLOW
POC GLUCOSE, URINE: NEGATIVE MG/DL
POC KETONES, URINE: NEGATIVE MG/DL
POC LEUKOCYTES, URINE: ABNORMAL
POC NITRITE,URINE: NEGATIVE
POC PH, URINE: 7.5 PH
POC PROTEIN, URINE: NEGATIVE MG/DL
POC SPECIFIC GRAVITY, URINE: 1.01

## 2024-10-04 PROCEDURE — 87086 URINE CULTURE/COLONY COUNT: CPT

## 2024-10-04 RX ORDER — CEPHALEXIN 500 MG/1
500 CAPSULE ORAL 2 TIMES DAILY
Qty: 14 CAPSULE | Refills: 0 | Status: SHIPPED | OUTPATIENT
Start: 2024-10-04 | End: 2024-10-11

## 2024-10-04 ASSESSMENT — ENCOUNTER SYMPTOMS
FATIGUE: 0
DYSURIA: 1
RESPIRATORY NEGATIVE: 1
ACTIVITY CHANGE: 0
NAUSEA: 0
CHILLS: 0
HEMATURIA: 0
APPETITE CHANGE: 0
FEVER: 0
FREQUENCY: 1
ABDOMINAL DISTENTION: 0
FLANK PAIN: 0
BACK PAIN: 0

## 2024-10-04 NOTE — TELEPHONE ENCOUNTER
Per HIPAA ok to leave detailed message, LM on pt voicemail with this information. She should call back if she has any questions or concerns.     Pt is scheduled with Dr Pozo on 10/7/24 at 2pm.

## 2024-10-04 NOTE — PROGRESS NOTES
Subjective   Patient ID: Cassia Humphries is a 89 y.o. female. They present today with a chief complaint of Urinary Frequency.    History of Present Illness  89-year-old female patient is here with her daughter.  Her daughter gives the majority of the history.  She is here for what is probably a urinary tract infection.  For the last 4 days she has had burning while voiding.  The daughter dipped her urine with a home kit and it came back positive for infection.  She denies fever chills nausea vomiting.  She denies any significant abdominal pain.  No back pain or any other symptoms.  The patient's daughter tells me that in the past the mom has needed hospitalization for urinary tract infections.  But she then tells me she does not feel that is the case at this point in time.  Patient's daughter also tells me that in the past they have matched the antibiotic to whether or not the germ is gram-negative or gram-positive.       History provided by:  Caregiver  History limited by:  Age   used: No    Urinary Frequency  Associated symptoms: no fatigue, no fever and no nausea        Past Medical History  Allergies as of 10/04/2024 - Reviewed 10/04/2024   Allergen Reaction Noted    Lorazepam Unknown 12/21/2023       (Not in a hospital admission)       Past Medical History:   Diagnosis Date    Anterior displaced type ii dens fracture, sequela 06/25/2019    Odontoid fracture, sequela    Encounter for general adult medical examination without abnormal findings 07/19/2019    Medicare annual wellness visit, initial    Hemangioma of skin and subcutaneous tissue 03/29/2022    Localized edema 05/28/2020    Bilateral edema of lower extremity    Multiple fractures of pelvis with stable disruption of pelvic ring, initial encounter for closed fracture (Multi) 04/01/2019    Closed pelvic ring fracture, initial encounter    Pain in left shoulder 01/08/2019    Left shoulder pain    Personal history of other diseases of  the musculoskeletal system and connective tissue 07/19/2019    History of low back pain    Personal history of other diseases of the musculoskeletal system and connective tissue 06/24/2019    History of neck pain    Primary osteoarthritis, right shoulder 07/19/2019    DJD of right shoulder    Segmental and somatic dysfunction of abdomen and other regions 11/03/2017    Somatic dysfunction of back    Squamous cell carcinoma of skin of other parts of face 03/29/2022    Stiffness of left shoulder, not elsewhere classified 01/08/2019    Stiffness of left shoulder, not elsewhere classified    Syncope 10/30/2023    SYNCOPE    Unilateral primary osteoarthritis, right knee 08/14/2017    Right knee DJD    Unspecified displaced fracture of second cervical vertebra, initial encounter for closed fracture 03/01/2019    Closed odontoid fracture, initial encounter    Unspecified displaced fracture of second cervical vertebra, subsequent encounter for fracture with routine healing 07/19/2019    Closed odontoid fracture with routine healing, subsequent encounter    Unspecified symptoms and signs involving the genitourinary system     UTI symptoms    Urinary tract infection, site not specified     Frequent UTI       Past Surgical History:   Procedure Laterality Date    CT ABDOMEN PELVIS ANGIOGRAM W AND/OR WO IV CONTRAST  12/21/2023    CT ABDOMEN PELVIS ANGIOGRAM W AND/OR WO IV CONTRAST 12/21/2023 [facility]    CT ANGIO NECK  1/24/2019    CT NECK ANGIO W AND WO IV CONTRAST 1/24/2019 [facility] CLINICAL LEGACY    MR HEAD ANGIO WO IV CONTRAST  8/13/2016    MR HEAD ANGIO WO IV CONTRAST 8/13/2016 [facility] EMERGENCY LEGACY    MR NECK ANGIO WO IV CONTRAST  8/13/2016    MR NECK ANGIO WO IV CONTRAST 8/13/2016 [facility] EMERGENCY LEGACY    OTHER SURGICAL HISTORY  07/01/2019    No history of surgery        reports that she quit smoking about 62 years ago. Her smoking use included cigarettes. She started smoking about 72 years ago. She has a 10 pack-year smoking  "history. She has been exposed to tobacco smoke. She has never used smokeless tobacco. She reports current alcohol use of about 4.0 standard drinks of alcohol per week. She reports that she does not use drugs.    Review of Systems  Review of Systems   Constitutional:  Negative for activity change, appetite change, chills, fatigue and fever.   HENT: Negative.     Respiratory: Negative.     Gastrointestinal:  Negative for abdominal distention and nausea.        According to the patient's daughter, she has chronic right-sided abdominal pain from a \"small loop of bowel\" that causes pain at times.  Otherwise she has no abdominal pain   Genitourinary:  Positive for dysuria and frequency. Negative for flank pain and hematuria.   Musculoskeletal:  Negative for back pain.                                  Objective    Vitals:    10/04/24 1316   Pulse: 97   Temp: 36.4 °C (97.6 °F)   SpO2: 97%     No LMP recorded.    Physical Exam  Vitals and nursing note reviewed.   Constitutional:       General: She is not in acute distress.     Appearance: Normal appearance. She is not ill-appearing, toxic-appearing or diaphoretic.      Comments: Pleasant well-dressed well coiffed elderly female sitting in a wheelchair with her daughter at her side.  Her daughter gives the majority of the history.   HENT:      Head: Normocephalic and atraumatic.      Nose: Nose normal.      Mouth/Throat:      Mouth: Mucous membranes are moist.   Eyes:      Extraocular Movements: Extraocular movements intact.      Conjunctiva/sclera: Conjunctivae normal.      Comments: Bilateral pupils 1 to 2 mm   Pulmonary:      Effort: Pulmonary effort is normal. No respiratory distress.   Abdominal:      Tenderness: There is no right CVA tenderness or left CVA tenderness.      Comments: Abdomen is soft rounded she does have some tenderness to the right lower quadrant which patient's daughter assures me is normal for her exam.   Musculoskeletal:      Cervical back: Normal " "range of motion and neck supple.   Skin:     General: Skin is warm and dry.   Neurological:      Mental Status: She is alert.         Procedures    Point of Care Test & Imaging Results from this visit  Results for orders placed or performed in visit on 10/04/24   POCT UA Automated manually resulted   Result Value Ref Range    POC Color, Urine Yellow Straw, Yellow, Light-Yellow    POC Appearance, Urine Clear Clear    POC Glucose, Urine NEGATIVE NEGATIVE mg/dl    POC Bilirubin, Urine NEGATIVE NEGATIVE    POC Ketones, Urine NEGATIVE NEGATIVE mg/dl    POC Specific Gravity, Urine 1.010 1.005 - 1.035    POC Blood, Urine NEGATIVE NEGATIVE    POC PH, Urine 7.5 No Reference Range Established PH    POC Protein, Urine NEGATIVE NEGATIVE, 30 (1+) mg/dl    Poc Nitrite, Urine NEGATIVE NEGATIVE    POC Leukocytes, Urine LARGE (3+) (A) NEGATIVE      No results found.    Diagnostic study results (if any) were reviewed by [facility].    Assessment/Plan   Allergies, medications, history, and pertinent labs/EKGs/Imaging reviewed by SARA Johansen.     Medical Decision Making  Patient has a history of urinary tract infections in the past.  The daughter tells me that she has needed hospitalization because of severe urinary tract infections.  She also tells me that in the past they have been able to take a urine specimen to her physician who would then prescribe an antibiotic based on whether \"it was a gram-positive or gram-negative\" infection.  In the past she has had problems tolerating different antibiotics.  The patient's daughter recommended erythromycin or amoxicillin as an antibiotic for UTI because she has tolerated it in the past for other problems.  Urinalysis here does indicate a urinary tract infection.  Culture is of course pending.  I discussed with the patient and her daughter the rationale for putting her on a broad-spectrum antibiotic today sending the culture and then changing the antibiotic if need be.  " The patient's daughter was unsure about using nitrofurantoin as she did not think her mother had had it in the past.  Keflex was finally decided upon as an appropriate antibiotic as she has tolerated it well in the past and it has generally good coverage for urinary tract infections.  They declined Pyridium.  We discussed reasons to go to the ER such as worsening condition fever chills etc.    Orders and Diagnoses  Diagnoses and all orders for this visit:  Frequent urination  -     POCT UA Automated manually resulted      Medical Admin Record      Patient disposition: Home    Electronically signed by [facility]  1:20 PM

## 2024-10-04 NOTE — TELEPHONE ENCOUNTER
Charlette called back and said they went to URGENT CARE and was prescribed Keflex to start.      I at that point called Charlette and FREDIS on voicemail saying her mother doesn't need to be seen in our office on Monday if she is going to be treated with antibiotics. Urgent care would call them with the results of culture if antibiotics would need to be switched.

## 2024-10-04 NOTE — TELEPHONE ENCOUNTER
There are no appointments for any providers today- told her to take her to urgent care since it's going to be the weekend. Charlette refused and said there is just no way. She would rather bring her in here on Monday- I told her I would need an approval from the provider to place her in a same day sick but I would like to send this back to you to get your opinion on that. Charlette said she would rather like to send a urine to the lab.

## 2024-10-04 NOTE — TELEPHONE ENCOUNTER
Pt's daughter is calling saying pt was discharged from PT and they are okay with it right now and maybe at a later date we will hopefully start another PT therapy. Another thing, daughter noticed that she may be starting symptoms of a UTI. She did two at home test and it has came back positive for Leuk and Nitrate. Pt is having more frequent urination and some tenderness and Charlette mentioned that she's having some cognitive issues- some confusions. Charlette is asking if you can place an order in for her to take her urine downstairs for the lab can test it. Please advised      Also- LM on Raquel Zamorano with the incorrect discharge information. Waiting for a call back.

## 2024-10-06 LAB — BACTERIA UR CULT: ABNORMAL

## 2024-10-07 ENCOUNTER — APPOINTMENT (OUTPATIENT)
Dept: PRIMARY CARE | Facility: CLINIC | Age: 89
End: 2024-10-07
Payer: MEDICARE

## 2024-10-07 DIAGNOSIS — N30.00 ACUTE CYSTITIS WITHOUT HEMATURIA: Primary | ICD-10-CM

## 2024-10-07 LAB — BACTERIA UR CULT: ABNORMAL

## 2024-10-07 RX ORDER — NITROFURANTOIN 25; 75 MG/1; MG/1
100 CAPSULE ORAL 2 TIMES DAILY
Qty: 14 CAPSULE | Refills: 0 | Status: SHIPPED | OUTPATIENT
Start: 2024-10-07 | End: 2024-10-14

## 2024-10-28 DIAGNOSIS — I48.91 ATRIAL FIBRILLATION, UNSPECIFIED TYPE (MULTI): ICD-10-CM

## 2024-10-28 DIAGNOSIS — I26.09 OTHER PULMONARY EMBOLISM WITH ACUTE COR PULMONALE, UNSPECIFIED CHRONICITY (MULTI): ICD-10-CM

## 2024-12-06 ENCOUNTER — APPOINTMENT (OUTPATIENT)
Dept: PRIMARY CARE | Facility: CLINIC | Age: 89
End: 2024-12-06
Payer: MEDICARE

## 2025-01-30 ENCOUNTER — TELEPHONE (OUTPATIENT)
Dept: PRIMARY CARE | Facility: CLINIC | Age: OVER 89
End: 2025-01-30
Payer: MEDICARE

## 2025-02-10 ENCOUNTER — PATIENT OUTREACH (OUTPATIENT)
Dept: PRIMARY CARE | Facility: CLINIC | Age: OVER 89
End: 2025-02-10
Payer: MEDICARE

## 2025-02-10 ENCOUNTER — TELEPHONE (OUTPATIENT)
Dept: PRIMARY CARE | Facility: CLINIC | Age: OVER 89
End: 2025-02-10
Payer: MEDICARE

## 2025-02-10 NOTE — PROGRESS NOTES
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 Voicemail left with contact information for patient to call back with any non-emergent questions or concerns.

## 2025-02-10 NOTE — TELEPHONE ENCOUNTER
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    Thank you

## 2025-02-17 ENCOUNTER — TELEPHONE (OUTPATIENT)
Dept: SCHEDULING | Age: OVER 89
End: 2025-02-17
Payer: MEDICARE

## 2025-02-17 ENCOUNTER — TELEPHONE (OUTPATIENT)
Dept: PRIMARY CARE | Facility: CLINIC | Age: OVER 89
End: 2025-02-17
Payer: MEDICARE

## 2025-02-17 NOTE — TELEPHONE ENCOUNTER
This pt's daughter, Charlette, left a msg stating that the pt has an appt to be seen on 3/4/25.  She is concerned that mom is not transportable by car.  She is looking for home care orders or continued orders for PT/OT/ Cognitive therapy/ Nurs/ and a home aide.  Please call Charlette at [phone].

## 2025-02-17 NOTE — TELEPHONE ENCOUNTER
Fabián Segura PT from [facility] is asking for a verbal for PT freq 2 times a week for 5 weeks. Please advise

## 2025-02-17 NOTE — TELEPHONE ENCOUNTER
Per HIPAA ok to leave detailed message on pt's daughter line (Charlette)  LM on pt voicemail with this information. She should call back if she has any questions or concerns.

## 2025-02-21 ENCOUNTER — TELEPHONE (OUTPATIENT)
Dept: PRIMARY CARE | Facility: CLINIC | Age: OVER 89
End: 2025-02-21
Payer: MEDICARE

## 2025-02-21 NOTE — TELEPHONE ENCOUNTER
Charlette called back and has moved appointment to [facility]. She also said OT was there yesterday. She has a bath aide 2 times a week and Nurse Visit 2 times a week. Charlette stated she can't get skilled nursing to treat her feet that she needs to go to podiatry off site- she has now missed 2 appointments and is asking for a referral for home care for podiatry- she said her toenails are overgrowth and prone to infection.

## 2025-03-04 ENCOUNTER — APPOINTMENT (OUTPATIENT)
Dept: PRIMARY CARE | Facility: CLINIC | Age: OVER 89
End: 2025-03-04
Payer: MEDICARE

## 2025-03-04 DIAGNOSIS — E87.1 HYPONATREMIA: ICD-10-CM

## 2025-03-04 DIAGNOSIS — H35.3232 EXUDATIVE AGE-RELATED MACULAR DEGENERATION, BILATERAL, WITH INACTIVE CHOROIDAL NEOVASCULARIZATION: ICD-10-CM

## 2025-03-04 DIAGNOSIS — D64.9 ANEMIA, UNSPECIFIED TYPE: ICD-10-CM

## 2025-03-04 DIAGNOSIS — I10 BENIGN ESSENTIAL HYPERTENSION: ICD-10-CM

## 2025-03-04 DIAGNOSIS — N18.31 STAGE 3A CHRONIC KIDNEY DISEASE (MULTI): ICD-10-CM

## 2025-03-04 DIAGNOSIS — I69.352 HEMIPLEGIA AND HEMIPARESIS FOLLOWING CEREBRAL INFARCTION AFFECTING LEFT DOMINANT SIDE (MULTI): Primary | ICD-10-CM

## 2025-03-04 DIAGNOSIS — I48.91 ATRIAL FIBRILLATION, UNSPECIFIED TYPE (MULTI): ICD-10-CM

## 2025-03-04 DIAGNOSIS — R53.1 GENERALIZED WEAKNESS: ICD-10-CM

## 2025-03-04 PROCEDURE — 1123F ACP DISCUSS/DSCN MKR DOCD: CPT | Performed by: NURSE PRACTITIONER

## 2025-03-04 PROCEDURE — 1158F ADVNC CARE PLAN TLK DOCD: CPT | Performed by: NURSE PRACTITIONER

## 2025-03-04 PROCEDURE — 1159F MED LIST DOCD IN RCRD: CPT | Performed by: NURSE PRACTITIONER

## 2025-03-04 PROCEDURE — 99214 OFFICE O/P EST MOD 30 MIN: CPT | Performed by: NURSE PRACTITIONER

## 2025-03-04 PROCEDURE — 1157F ADVNC CARE PLAN IN RCRD: CPT | Performed by: NURSE PRACTITIONER

## 2025-03-04 PROCEDURE — 1160F RVW MEDS BY RX/DR IN RCRD: CPT | Performed by: NURSE PRACTITIONER

## 2025-03-04 PROCEDURE — 1036F TOBACCO NON-USER: CPT | Performed by: NURSE PRACTITIONER

## 2025-03-04 RX ORDER — SODIUM CHLORIDE 1000 MG
0.5 TABLET, SOLUBLE MISCELLANEOUS 2 TIMES DAILY
COMMUNITY

## 2025-03-04 RX ORDER — LISINOPRIL 5 MG/1
5 TABLET ORAL DAILY
COMMUNITY

## 2025-03-04 ASSESSMENT — ENCOUNTER SYMPTOMS
SHORTNESS OF BREATH: 0
WEAKNESS: 1
BACK PAIN: 1
COUGH: 0
VOMITING: 0
FEVER: 0
HEADACHES: 0
FATIGUE: 0
NAUSEA: 0
CHILLS: 0
CHEST TIGHTNESS: 0
ABDOMINAL PAIN: 0
ARTHRALGIAS: 1

## 2025-03-04 ASSESSMENT — PATIENT HEALTH QUESTIONNAIRE - PHQ9
2. FEELING DOWN, DEPRESSED OR HOPELESS: SEVERAL DAYS
SUM OF ALL RESPONSES TO PHQ9 QUESTIONS 1 AND 2: 1
1. LITTLE INTEREST OR PLEASURE IN DOING THINGS: NOT AT ALL

## 2025-03-04 NOTE — PROGRESS NOTES
Subjective   Cassia Humphries is a 89 y.o. female who presents for Follow-up (From getting discharged from [facility] on 2/7/25).    Virtual or Telephone Consent    An interactive audio and video telecommunication system which permits real time communications between the patient (at the originating site) and provider (at the distant site) was utilized to provide this telehealth service.   Verbal consent was requested and obtained from Cassia Humphries on this date, 03/04/25 for a telehealth visit and the patient's location was confirmed at the time of the visit.     HPI  She was seen today virtually for a follow up from hospitalization and rehab. She is present today with her daughter Charlette who provides most of the information today. She is extremely difficult to transport outside the home at this time and requires a wheelchair and assisting with transferring by daughter. On November 8th she woke up in the AM and seemed weaker and groggy. Ate breakfast ok. When she went to stand up her left hand was floppy and slipping off the walker. She then began leaning to the right. Daughter called EMS. Her eyes then shifted and she would not come back to center.   ? Seizure or stroke- went to [facility].   She was found to have an ischemic stroke in the occipital lobe and thalamus. Eliquis was increased back to 5 mg twice a day.   She was in the hospital for about 4 days then went to [facility] acute rehab from 11/11-12/8, then to [facility] until 2/7/25. During that time she had parainfluenza virus, Norovirus and a UTI.   Since then she has been home with her daughter.  She was a 2 person transfer when she came home.  She continues with some left side weakness and overall generalized weakness.  She can get up and stand up but balance is an issue to do her own hygiene.  She has been using a bedside commode.  Rolling in bed is painful to shoulders.  Daughter reports over the past 24-48 hours she has been different  "cognitively.   This AM BP 80/50's and HR 40-50s.  This afternoon /80 and HR 62.  She is trying very hard to keep her out of the ER.  She is fidgeting and picking at things. Not making great eye contact.  (+) distracted.   She reports, \" I am not feeling very well. Feels like I'm not working.\"  She denies pain other than chronic in shoulders and back.   No chest pain, SOB.  No cough.   No abdominal pain .  No headache.  She has had issues with her sodium level in the past. Has not been checked for about a month.   She continues to work with PT, OT, ST and SN. These are on hold awaiting approval from insurance company.     Review of Systems   Constitutional:  Negative for chills, fatigue and fever.   Respiratory:  Negative for cough, chest tightness and shortness of breath.    Cardiovascular:  Negative for chest pain and leg swelling.   Gastrointestinal:  Negative for abdominal pain, nausea and vomiting.   Musculoskeletal:  Positive for arthralgias and back pain.   Neurological:  Positive for weakness. Negative for headaches.       Objective   There were no vitals taken for this visit.    Physical Exam  Constitutional:       General: She is not in acute distress.     Appearance: Normal appearance. She is not toxic-appearing.   Pulmonary:      Effort: Pulmonary effort is normal. No respiratory distress.   Neurological:      Mental Status: She is alert. She is confused.   Psychiatric:         Attention and Perception: She is inattentive.         Cognition and Memory: Cognition is impaired.         Assessment/Plan   Problem List Items Addressed This Visit       Anemia     Check updated CBC.         Relevant Orders    CBC and Auto Differential    Benign essential hypertension     BP has been low on home readings. Advised may hold Lisinopril 5 mg daily for a couple days and monitor BP twice a day.          Hyponatremia     Check updated labs today.         Relevant Orders    Basic Metabolic Panel    Exudative " age-related macular degeneration, bilateral, with inactive choroidal neovascularization     She follows with ophthalmology.         Atrial fibrillation, unspecified type (Multi)     Continue Eliquis at the current dose.          Stage 3a chronic kidney disease (Multi)     Check updated labs as ordered to assess stability.         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            It has been a pleasure seeing you today!

## 2025-03-05 NOTE — ASSESSMENT & PLAN NOTE
BP has been low on home readings. Advised may hold Lisinopril 5 mg daily for a couple days and monitor BP twice a day.

## 2025-03-06 ENCOUNTER — TELEPHONE (OUTPATIENT)
Dept: PRIMARY CARE | Facility: CLINIC | Age: OVER 89
End: 2025-03-06
Payer: MEDICARE

## 2025-03-06 NOTE — TELEPHONE ENCOUNTER
Pt's daughter Charlette calling (on HIPAA) to let you know pt had labs done today. Her BP has been going up and down daughter has readings if you would like them.

## 2025-03-07 NOTE — TELEPHONE ENCOUNTER
Charlette is calling in again and said she is looking for her mothers Results- She said she knows they have been faxed here since she confirmed it with the hometown hc people. Please advise

## 2025-03-10 ENCOUNTER — PATIENT OUTREACH (OUTPATIENT)
Dept: PRIMARY CARE | Facility: CLINIC | Age: OVER 89
End: 2025-03-10
Payer: MEDICARE

## 2025-03-10 DIAGNOSIS — E87.1 HYPONATREMIA: Primary | ICD-10-CM

## 2025-03-10 NOTE — TELEPHONE ENCOUNTER
Charlette is calling in saying she took her mom to the ER late Friday and waited a long time. She said they did a blood drawn and her sodium came up to 127. The hospital gave her an option to have her stay in the hospital to do a slow IV or take her home. They decided to bring her home and she is not doing two pills of her sodium pill equaling to 2 grams. Charlette said she's improving strength, blood pressure and pulse wise but she's washed out, confused but not frightly confused. She said it is difficult to transport but not unlikely, so she may bring her mom into our lab to have them drawn instead of having the homecare people do it. Hospital would like her to have another blood drawn today and she is asking you to place the orders so we get the orders directly instead of waiting on the homecare people to fax the results over. Please advise

## 2025-03-10 NOTE — PROGRESS NOTES
Successful outreach to patient regarding hospitalization as patient continues TCM program.   At time of outreach call the patient feels as if their condition has stayed the same since initial visit with PCP or specialist.  Questions or concerns addressed at this time with patient.   Provided contact information to patient if any further non-emergent needs arise.     Pt. Was seen in the ED over the weekend. Pt. Daughter is having issues with referrals from  home health services.

## 2025-03-10 NOTE — TELEPHONE ENCOUNTER
Charlette is calling in again regarding message earlier. She said she would like to scrap going through [facility] for labs and would like you to place the order. She is wondering if there is someone from  that can come to her house to do the blood drawn. She did mentioned it is nice out so may be able to bring her in, she would like a follow up asap.

## 2025-03-11 LAB
ANION GAP SERPL CALCULATED.4IONS-SCNC: 12 MMOL/L (CALC) (ref 7–17)
BASOPHILS # BLD AUTO: 47 CELLS/UL (ref 0–200)
BASOPHILS NFR BLD AUTO: 0.8 %
BUN SERPL-MCNC: 21 MG/DL (ref 7–25)
BUN/CREAT SERPL: ABNORMAL (CALC) (ref 6–22)
CALCIUM SERPL-MCNC: 9.6 MG/DL (ref 8.6–10.4)
CHLORIDE SERPL-SCNC: 98 MMOL/L (ref 98–110)
CO2 SERPL-SCNC: 23 MMOL/L (ref 20–32)
CREAT SERPL-MCNC: 0.63 MG/DL (ref 0.6–0.95)
EGFRCR SERPLBLD CKD-EPI 2021: 83 ML/MIN/1.73M2
EOSINOPHIL # BLD AUTO: 142 CELLS/UL (ref 15–500)
EOSINOPHIL NFR BLD AUTO: 2.4 %
ERYTHROCYTE [DISTWIDTH] IN BLOOD BY AUTOMATED COUNT: 12.5 % (ref 11–15)
GLUCOSE SERPL-MCNC: 89 MG/DL (ref 65–139)
HCT VFR BLD AUTO: 35.1 % (ref 35–45)
HGB BLD-MCNC: 12.1 G/DL (ref 11.7–15.5)
LYMPHOCYTES # BLD AUTO: 732 CELLS/UL (ref 850–3900)
LYMPHOCYTES NFR BLD AUTO: 12.4 %
MCH RBC QN AUTO: 34.1 PG (ref 27–33)
MCHC RBC AUTO-ENTMCNC: 34.5 G/DL (ref 32–36)
MCV RBC AUTO: 98.9 FL (ref 80–100)
MONOCYTES # BLD AUTO: 602 CELLS/UL (ref 200–950)
MONOCYTES NFR BLD AUTO: 10.2 %
NEUTROPHILS # BLD AUTO: 4378 CELLS/UL (ref 1500–7800)
NEUTROPHILS NFR BLD AUTO: 74.2 %
PLATELET # BLD AUTO: 339 THOUSAND/UL (ref 140–400)
PMV BLD REES-ECKER: 9.7 FL (ref 7.5–12.5)
POTASSIUM SERPL-SCNC: 4.5 MMOL/L (ref 3.5–5.3)
RBC # BLD AUTO: 3.55 MILLION/UL (ref 3.8–5.1)
SODIUM SERPL-SCNC: 133 MMOL/L (ref 135–146)
WBC # BLD AUTO: 5.9 THOUSAND/UL (ref 3.8–10.8)

## 2025-03-11 NOTE — TELEPHONE ENCOUNTER
----- Message from Lizbeth Richards sent at 3/11/2025  7:51 AM EDT -----  Please let Charlette know her mom's sodium level has improved and is now at 133.  Continue with the increased dose of the sodium as recommended at the ER.  CBC was also checked because the order was in.  This was also stable.

## 2025-03-11 NOTE — TELEPHONE ENCOUNTER
Talked with pt's daughter (Charlette) about results and she verbalized understanding.    Charlette mentioned-   Her moms that both her feet are swelling- not significant yet  Bp is was high- 140  145 in the morning   120 later in day    Then I asked for some readings and she said   154/85   144/63  132/80 - bedtime  102/80  123/85     She then asked if she should have her started using liquid IV that has 510MG sodium plus vitamins or have this on hand for her?    She also mentioned she got a Denial for skilled home health- would like to appeal and would need a letter from you supporting this.  She was approved for 3 speech, 3 PT and  no OT

## 2025-03-14 DIAGNOSIS — E87.1 HYPONATREMIA: Primary | ICD-10-CM

## 2025-03-14 NOTE — TELEPHONE ENCOUNTER
Talked to Charlette, she verbalized understanding. She also mentioned her mother's BP was high yesterday, readings were 198/98 and 185/89. She put her back on Lisinopril. She took it today and it was 153/80. She said it is hard to elevate her feet due to her back and hip. She has been wearing compression socks as much as she can. She said her feet is swollen more during the day which may be from the salt intake- but better at night time. She mentioned about the appeal letter again- she would like to possibly have this turned in by next week

## 2025-03-19 ENCOUNTER — TELEPHONE (OUTPATIENT)
Dept: PRIMARY CARE | Facility: CLINIC | Age: OVER 89
End: 2025-03-19
Payer: MEDICARE

## 2025-03-19 DIAGNOSIS — E87.1 HYPONATREMIA: Primary | ICD-10-CM

## 2025-03-19 DIAGNOSIS — I69.352 HEMIPLEGIA AND HEMIPARESIS FOLLOWING CEREBRAL INFARCTION AFFECTING LEFT DOMINANT SIDE (MULTI): ICD-10-CM

## 2025-03-19 DIAGNOSIS — I48.91 ATRIAL FIBRILLATION, UNSPECIFIED TYPE (MULTI): ICD-10-CM

## 2025-03-19 LAB
ANION GAP SERPL CALCULATED.4IONS-SCNC: 7 MMOL/L (CALC) (ref 7–17)
BUN SERPL-MCNC: 19 MG/DL (ref 7–25)
BUN/CREAT SERPL: ABNORMAL (CALC) (ref 6–22)
CALCIUM SERPL-MCNC: 9.6 MG/DL (ref 8.6–10.4)
CHLORIDE SERPL-SCNC: 101 MMOL/L (ref 98–110)
CO2 SERPL-SCNC: 26 MMOL/L (ref 20–32)
CREAT SERPL-MCNC: 0.64 MG/DL (ref 0.6–0.95)
EGFRCR SERPLBLD CKD-EPI 2021: 83 ML/MIN/1.73M2
GLUCOSE SERPL-MCNC: 88 MG/DL (ref 65–99)
POTASSIUM SERPL-SCNC: 4.7 MMOL/L (ref 3.5–5.3)
SODIUM SERPL-SCNC: 134 MMOL/L (ref 135–146)

## 2025-03-19 NOTE — TELEPHONE ENCOUNTER
Per HIPAA ok to leave detailed message, LM on pt's daughter (Charlette) voicemail with results. She should call back if she has any questions or concerns.

## 2025-03-19 NOTE — TELEPHONE ENCOUNTER
----- Message from Lizbeth Richards sent at 3/19/2025 12:49 PM EDT -----  Please let Charlette know her mom sodium level is at 134.  Recommend continuing with current sodium replacement. Recheck in 2-3 weeks- order placed.

## 2025-03-31 ENCOUNTER — APPOINTMENT (OUTPATIENT)
Dept: PHARMACY | Facility: HOSPITAL | Age: OVER 89
End: 2025-03-31
Payer: MEDICARE

## 2025-03-31 DIAGNOSIS — I48.91 ATRIAL FIBRILLATION, UNSPECIFIED TYPE (MULTI): ICD-10-CM

## 2025-03-31 DIAGNOSIS — I26.09 OTHER PULMONARY EMBOLISM WITH ACUTE COR PULMONALE, UNSPECIFIED CHRONICITY (MULTI): ICD-10-CM

## 2025-03-31 DIAGNOSIS — I69.352 HEMIPLEGIA AND HEMIPARESIS FOLLOWING CEREBRAL INFARCTION AFFECTING LEFT DOMINANT SIDE (MULTI): ICD-10-CM

## 2025-03-31 NOTE — PROGRESS NOTES
Clinical Pharmacy Appointment    Patient ID: Cassia Humphries is a 89 y.o. female who presents for Anticoagulation.    Pt is here for First appointment.     Referring Provider: Lizbeth Richards APR*  PCP: HELEN Noriega-CNP   Last visit with PCP: 3/4/2025   Next visit with PCP: not yet scheduled     Patient Assistance for Eliquis is currently pending. If approved, medication(s) will be received at no cost to patient from [facility].      Subjective     HPI  ANTICOAGULATION  Does patient follow with cardiology? 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  She was treated with Eliquis 5 mg twice daily  Eliquis dose was eventually reduced to 2.5 mg twice daily after risk/benefit discussion  Patient had an ischemic stroke in fall 2024   Eliquis dose was increased back to 5 mg twice daily  She is projected to be on anticoagulation indefinitely    Current Anticoagulants:  Eliquis 5 mg twice daily (dose appropriate based on history)    No side effects or missed doses at this time    Pertinent History:  Medical: Prior PE, DVT, stroke, and hip fracture  Social: Lives with daughter, uses wheelchair in the community, occasional wine  Medication: No current medications that may increase bleed risk    Monitoring:  Date of last BMP: 3/18/2025  Date of last CBC: 3/10/2025  Recent Hospitalizations: No  Recent Falls/Trauma: No  Changes in Tobacco or Alcohol Intake: No      Medication System Management  Patient's preferred pharmacy: [facility]  Adherence/Organization: no concerns identified  Affordability/Accessibility: cost/high copays with Eliquis  Will apply for  Patient Assistance Program      Objective   Allergies   Allergen Reactions    Lorazepam Hives     Mental disorder    Morphine Hives     Mental disorder/ confusion     Social History     Social History Narrative    Not on file      Medication Review  Current Outpatient Medications   Medication  "Instructions    acetaminophen (TYLENOL ARTHRITIS PAIN) 650 mg, 2 times daily    acetaminophen (Tylenol Extra Strength) 500 mg tablet Every 6 hours PRN    apixaban (ELIQUIS) 5 mg, oral, 2 times daily    ascorbic acid (Vitamin C) 1,000 mg tablet 1 capsule, Daily    cholecalciferol (Vitamin D-3) 25 MCG (1000 UT) capsule 1 capsule, Daily    cyanocobalamin (VITAMIN B-12) 500 mcg, Daily    docusate sodium (COLACE) 100 mg, As needed    erythromycin (Romycin) 5 mg/gram (0.5 %) ophthalmic ointment As needed    Lactobacillus acidophilus (ACIDOPHILUS ORAL) 1 tablet, Daily    lidocaine 4 % patch 1 patch, Daily    lisinopril 5 mg, oral, Daily    peg 400-propylene glycol (Systane, propylene glycoL,) 0.4-0.3 % drops ophthalmic drops 1 drop, 2 times daily PRN    sodium chloride 0.5 g, 2 times daily      Vitals  BP Readings from Last 2 Encounters:   07/03/24 121/73   04/10/24 130/70     BMI Readings from Last 1 Encounters:   05/06/24 23.24 kg/m²      Labs  A1C  No results found for: \"HGBA1C\"  BMP  Lab Results   Component Value Date    CALCIUM 9.8 04/04/2025     04/04/2025    K 4.5 04/04/2025    CO2 25 04/04/2025     04/04/2025    BUN 22 04/04/2025    CREATININE 0.70 04/04/2025    EGFR 81 04/04/2025     LFTs  Lab Results   Component Value Date    ALT 31 01/20/2025    AST 26 01/20/2025    ALKPHOS 209 (H) 01/20/2025    BILITOT 0.4 01/20/2025     FLP  Lab Results   Component Value Date    TRIG 111 08/10/2018    CHOL 204 (H) 08/10/2018    LDLF 122 (H) 08/10/2018    HDL 59.6 08/10/2018     Urine Microalbumin  No results found for: \"MICROALBCREA\"  Weight Management  Wt Readings from Last 3 Encounters:   05/06/24 55.8 kg (123 lb)   04/15/24 55.8 kg (123 lb)   01/22/24 55.8 kg (123 lb)      There is no height or weight on file to calculate BMI.     Assessment/Plan   Problem List Items Addressed This Visit       Other pulmonary embolism with acute cor pulmonale, unspecified chronicity (Multi)    Relevant Medications    apixaban " (Eliquis) 5 mg tablet    Atrial fibrillation, unspecified type (Multi)    Relevant Medications    apixaban (Eliquis) 5 mg tablet    Hemiplegia and hemiparesis following cerebral infarction affecting left dominant side (Multi)       DISCUSSION/PLAN:  Patient with history of PE, DVT, stroke, and atrial fibrillation currently on Eliquis. She does not follow with cardiology but is expected to be on anticoagulation indefinitely. She denies missed doses, side effects, bleeding, or recent falls. Recommend continuing current Eliquis dose as she had an ischemic stroke while on lower dose. Will submit application for cost assistance due to high copays. Follow up in 1-2 weeks with determination from  Patient Assistance Program.    CONTINUE  Eliquis 5 mg twice daily    Monitoring and Education Discussed:  Counseled on Eliquis mechanism of action, dosing, drug interactions, side effects, and monitoring  Discussed signs of bleeding such as dark tarry stool or coffee ground-like emesis  Recommend seeking emergency treatment for head injury or uncontrollable bleeding  Answered all patient questions    Continue all meds under the continuation of care with the referring provider and clinical pharmacy team.    Clinical Pharmacist follow-up: approximately 1-2 weeks for determination from  Patient Assistance Program  Orders placed: Eliquis 5 mg to [facility] for cost assistance application    Thank you,   Bobbi Srivastava, PharmD  Clinical Pharmacy Specialist, Primary Care   [phone]    Verbal consent to manage patient's drug therapy was obtained from an individual authorized to act on behalf of a patient. Patient was informed they may decline to participate or withdraw from participation in pharmacy services at any time.

## 2025-04-02 DIAGNOSIS — I10 BENIGN ESSENTIAL HYPERTENSION: ICD-10-CM

## 2025-04-02 DIAGNOSIS — E87.1 HYPONATREMIA: Primary | ICD-10-CM

## 2025-04-05 LAB
ANION GAP SERPL CALCULATED.4IONS-SCNC: 11 MMOL/L (CALC) (ref 7–17)
BUN SERPL-MCNC: 22 MG/DL (ref 7–25)
BUN/CREAT SERPL: NORMAL (CALC) (ref 6–22)
CALCIUM SERPL-MCNC: 9.8 MG/DL (ref 8.6–10.4)
CHLORIDE SERPL-SCNC: 100 MMOL/L (ref 98–110)
CO2 SERPL-SCNC: 25 MMOL/L (ref 20–32)
CREAT SERPL-MCNC: 0.7 MG/DL (ref 0.6–0.95)
EGFRCR SERPLBLD CKD-EPI 2021: 81 ML/MIN/1.73M2
GLUCOSE SERPL-MCNC: 77 MG/DL (ref 65–139)
POTASSIUM SERPL-SCNC: 4.5 MMOL/L (ref 3.5–5.3)
SODIUM SERPL-SCNC: 136 MMOL/L (ref 135–146)

## 2025-04-07 ENCOUNTER — TELEPHONE (OUTPATIENT)
Dept: PRIMARY CARE | Facility: CLINIC | Age: OVER 89
End: 2025-04-07
Payer: MEDICARE

## 2025-04-07 RX ORDER — LISINOPRIL 5 MG/1
5 TABLET ORAL DAILY
Qty: 90 TABLET | Refills: 1 | Status: SHIPPED | OUTPATIENT
Start: 2025-04-07

## 2025-04-07 NOTE — TELEPHONE ENCOUNTER
Talked with pt about results and she verbalized understanding and had no further questions at this time.     She would like to have the blood work put in the system.    She is also asking for a refill on Lisinopril- she said the last one was 2.5mg from the nursing home but if wondering if you'd prescribed 5mg.

## 2025-04-07 NOTE — TELEPHONE ENCOUNTER
----- Message from Lizbeth Arguetager sent at 4/7/2025 11:45 AM EDT -----  Please let her daughter know her labs looked great.  Kidney function looks good.  Her sodium was 136.  She can decrease the dose of sodium tablets slightly as we discussed on the phone last week.  Would recommend rechecking again in about 2 to 4 weeks.  If agreeable, let me know and I will place the order.

## 2025-04-07 NOTE — TELEPHONE ENCOUNTER
Pt's daughter (Charlette) verbalized understanding. She is now thinking we should do 2.5mg on the Lisinopril but she said she will cut it in half if she has to.    FYI- she has one therapist appointment left for cognitive- she is going to reopen the appeal since it went no where.

## 2025-04-10 ENCOUNTER — TELEPHONE (OUTPATIENT)
Dept: PHARMACY | Facility: HOSPITAL | Age: OVER 89
End: 2025-04-10
Payer: MEDICARE

## 2025-04-10 PROCEDURE — RXMED WILLOW AMBULATORY MEDICATION CHARGE

## 2025-04-10 NOTE — TELEPHONE ENCOUNTER
Clinical Pharmacy Phone Call    Contacted patient's daughter in response to email with questions about blood pressure, sodium, and previous lisinopril dosing. All questions answered and caregiver agreeable to keeping blood pressure log. Still awaiting determination about cost assistance for Eliquis, but anticipate approval any day. Patient has enough tablets for another week.     Thank you,   Bobbi Srivastava, PharmD  Clinical Pharmacy Specialist, Primary Care   [phone]

## 2025-04-11 ENCOUNTER — PHARMACY VISIT (OUTPATIENT)
Dept: PHARMACY | Facility: CLINIC | Age: OVER 89
End: 2025-04-11
Payer: MEDICARE

## 2025-04-11 ENCOUNTER — TELEPHONE (OUTPATIENT)
Dept: PHARMACY | Facility: HOSPITAL | Age: OVER 89
End: 2025-04-11
Payer: MEDICARE

## 2025-04-11 DIAGNOSIS — I26.09 OTHER PULMONARY EMBOLISM WITH ACUTE COR PULMONALE, UNSPECIFIED CHRONICITY (MULTI): ICD-10-CM

## 2025-04-11 DIAGNOSIS — I48.91 ATRIAL FIBRILLATION, UNSPECIFIED TYPE (MULTI): Primary | ICD-10-CM

## 2025-04-11 NOTE — TELEPHONE ENCOUNTER
Clinical Pharmacy Phone Call    Contacted patient/family to let them know that the application for cost assistance has been approved.     This approval is valid through 4/10/2026 as long as the following criteria continue to be satisfied:     Your medication (Eliquis) remains covered under your current insurance plan.   Your prescriber does not discontinue therapy.   You do not seek reimbursement from any other private or government-funded programs for the medication.    Under this program, the pharmacy will first bill your insurance plan for your indemnified specified medication. The [employer] Assistance Fund will then offset your copay balance, so that your out-of pocket expense for your specialty medication will be $0.00.    Clinical Pharmacy Follow-Up: July 2nd, 2025 at 3:20 PM    Thank you,   Bobbi Srivastava, PharmD  Clinical Pharmacy Specialist, Primary Care   [phone]

## 2025-04-21 ENCOUNTER — TELEPHONE (OUTPATIENT)
Dept: PRIMARY CARE | Facility: CLINIC | Age: OVER 89
End: 2025-04-21
Payer: MEDICARE

## 2025-04-21 NOTE — TELEPHONE ENCOUNTER
Charlette called back and said that Dahlia was the one who called and previously hasn't seen her for about a month. She said this BP was done at about 9am and she did not take her BP medication.. Home readings have been ranging 118/76-80. She takes 1 pills of Lisinopril at 5mg daily and she takes this between 1030am -12pm

## 2025-04-21 NOTE — TELEPHONE ENCOUNTER
----- Message from Lizbeth Richards sent at 4/18/2025  2:20 PM EDT -----  The home care reached out reporting an elevated BP of 160/100. How have her other readings been at home? What dose of Lisinopril is she currently taking?

## 2025-04-23 ENCOUNTER — TELEPHONE (OUTPATIENT)
Dept: PRIMARY CARE | Facility: CLINIC | Age: OVER 89
End: 2025-04-23
Payer: MEDICARE

## 2025-04-23 NOTE — TELEPHONE ENCOUNTER
Paula from [facility] is calling in saying if you'd prescribe hydrophilic patch for pt because it looks like on left buttock she has a sore that's intact from incontinence.     Shankar Dial

## 2025-04-25 NOTE — TELEPHONE ENCOUNTER
Attempted to call but was unable to get a live person.    Attempted to call (Charlette) and left a voicemail. Will attempt later today.

## 2025-05-03 LAB
ANION GAP SERPL CALCULATED.4IONS-SCNC: 8 MMOL/L (CALC) (ref 7–17)
BUN SERPL-MCNC: 18 MG/DL (ref 7–25)
BUN/CREAT SERPL: NORMAL (CALC) (ref 6–22)
CALCIUM SERPL-MCNC: 9.6 MG/DL (ref 8.6–10.4)
CHLORIDE SERPL-SCNC: 101 MMOL/L (ref 98–110)
CO2 SERPL-SCNC: 26 MMOL/L (ref 20–32)
CREAT SERPL-MCNC: 0.64 MG/DL (ref 0.6–0.95)
EGFRCR SERPLBLD CKD-EPI 2021: 83 ML/MIN/1.73M2
GLUCOSE SERPL-MCNC: 105 MG/DL (ref 65–139)
POTASSIUM SERPL-SCNC: 4.5 MMOL/L (ref 3.5–5.3)
SODIUM SERPL-SCNC: 135 MMOL/L (ref 135–146)

## 2025-05-05 ENCOUNTER — TELEPHONE (OUTPATIENT)
Dept: PRIMARY CARE | Facility: CLINIC | Age: OVER 89
End: 2025-05-05
Payer: MEDICARE

## 2025-05-05 NOTE — TELEPHONE ENCOUNTER
Talked with pt's daughter (Charlette) about results and she verbalized understanding.    She is asking if she needs to continute with sodium chloride 0.5 tablet daily since it's steady? Also, he BP has been 104/666 and 103/70 with an extra pill each day. What's your recommendations?

## 2025-05-05 NOTE — TELEPHONE ENCOUNTER
----- Message from Lizbeth Richards sent at 5/3/2025  7:33 AM EDT -----  Please let Charlette know her mom's labs look good.  Her sodium is holding steady at 135.  ----- Message -----  From: [facility] In  Sent: 5/3/2025   6:20 AM EDT  To: Lizbeth Richards, HELEN-CNP

## 2025-05-06 NOTE — TELEPHONE ENCOUNTER
Charlette called, lvm, she just wanted to let you know pt is taking 1.5 tab sodium chloride, 1 lisinopril a day, and 1 eliquis twice a day.     BP today 120/66

## 2025-05-12 ENCOUNTER — PATIENT OUTREACH (OUTPATIENT)
Dept: PRIMARY CARE | Facility: CLINIC | Age: OVER 89
End: 2025-05-12
Payer: MEDICARE

## 2025-05-27 ENCOUNTER — OFFICE VISIT (OUTPATIENT)
Dept: WOUND CARE | Facility: CLINIC | Age: OVER 89
End: 2025-05-27
Payer: MEDICARE

## 2025-05-27 PROCEDURE — 11042 DBRDMT SUBQ TIS 1ST 20SQCM/<: CPT

## 2025-05-27 PROCEDURE — 99213 OFFICE O/P EST LOW 20 MIN: CPT | Mod: 25

## 2025-06-03 ENCOUNTER — OFFICE VISIT (OUTPATIENT)
Dept: WOUND CARE | Facility: CLINIC | Age: OVER 89
End: 2025-06-03
Payer: MEDICARE

## 2025-06-03 PROCEDURE — 99213 OFFICE O/P EST LOW 20 MIN: CPT

## 2025-06-10 ENCOUNTER — APPOINTMENT (OUTPATIENT)
Dept: WOUND CARE | Facility: CLINIC | Age: OVER 89
End: 2025-06-10
Payer: MEDICARE

## 2025-06-27 ENCOUNTER — TELEPHONE (OUTPATIENT)
Dept: PRIMARY CARE | Facility: CLINIC | Age: OVER 89
End: 2025-06-27
Payer: MEDICARE

## 2025-06-27 NOTE — TELEPHONE ENCOUNTER
----- Message from An HANSEN sent at 6/27/2025  8:15 AM EDT -----  Regarding: RE: TCM/ Pt. question    ----- Message -----  From: Devaughn Morse LPN  Sent: 6/26/2025  12:52 PM EDT  To:  [place] [id number] Clinical Support Staff  Subject: TCM/ Pt. question                                Pt. Daughter called asking questions about her mother.     Could someone from the office please call and speak with the pt. Daughter    Thank you

## 2025-06-27 NOTE — TELEPHONE ENCOUNTER
Charlette called back and said that he concerns are her weight baring on her L knee. She is in a lot of pain, can't really walk nor stand for a long period of time. She reports that her mom won't go through surgery but is wondering if there is something she can do as she did have a fall a few weeks ago and had an Exam but they did not order X-rays. She said her mom is in a lot of pain which is making her have cognitive confusion but Charlette reports she does not have A/C in the house but is keeping in as cool as she can. She said she has a call out to Orthopaedic for Dr Ríos but do you have any recommendations.  - she said she hasn't been to an Orthopaedic's in several years.

## 2025-07-02 ENCOUNTER — APPOINTMENT (OUTPATIENT)
Dept: PHARMACY | Facility: HOSPITAL | Age: OVER 89
End: 2025-07-02
Payer: MEDICARE

## 2025-07-03 DIAGNOSIS — M25.562 CHRONIC PAIN OF LEFT KNEE: Primary | ICD-10-CM

## 2025-07-03 DIAGNOSIS — G89.29 CHRONIC PAIN OF LEFT KNEE: Primary | ICD-10-CM

## 2025-07-09 ENCOUNTER — TELEPHONE (OUTPATIENT)
Dept: PHARMACY | Facility: HOSPITAL | Age: OVER 89
End: 2025-07-09
Payer: MEDICARE

## 2025-07-09 ENCOUNTER — HOSPITAL ENCOUNTER (OUTPATIENT)
Dept: RADIOLOGY | Facility: HOSPITAL | Age: OVER 89
Discharge: HOME | End: 2025-07-09
Payer: MEDICARE

## 2025-07-09 ENCOUNTER — OFFICE VISIT (OUTPATIENT)
Dept: SPORTS MEDICINE | Facility: HOSPITAL | Age: OVER 89
End: 2025-07-09
Payer: MEDICARE

## 2025-07-09 DIAGNOSIS — G89.29 CHRONIC PAIN OF BOTH KNEES: ICD-10-CM

## 2025-07-09 DIAGNOSIS — M25.562 CHRONIC PAIN OF BOTH KNEES: ICD-10-CM

## 2025-07-09 DIAGNOSIS — I69.352 HEMIPLEGIA AND HEMIPARESIS FOLLOWING CEREBRAL INFARCTION AFFECTING LEFT DOMINANT SIDE (MULTI): ICD-10-CM

## 2025-07-09 DIAGNOSIS — M25.561 CHRONIC PAIN OF BOTH KNEES: ICD-10-CM

## 2025-07-09 DIAGNOSIS — M17.0 PRIMARY OSTEOARTHRITIS OF BOTH KNEES: Primary | ICD-10-CM

## 2025-07-09 DIAGNOSIS — M22.02 RECURRENT DISLOCATION OF LEFT PATELLA: ICD-10-CM

## 2025-07-09 PROCEDURE — 73564 X-RAY EXAM KNEE 4 OR MORE: CPT | Mod: 50

## 2025-07-09 PROCEDURE — 73564 X-RAY EXAM KNEE 4 OR MORE: CPT | Mod: BILATERAL PROCEDURE | Performed by: RADIOLOGY

## 2025-07-09 PROCEDURE — 1036F TOBACCO NON-USER: CPT | Performed by: PEDIATRICS

## 2025-07-09 PROCEDURE — 99212 OFFICE O/P EST SF 10 MIN: CPT

## 2025-07-09 PROCEDURE — RXMED WILLOW AMBULATORY MEDICATION CHARGE

## 2025-07-09 PROCEDURE — 1160F RVW MEDS BY RX/DR IN RCRD: CPT | Performed by: PEDIATRICS

## 2025-07-09 PROCEDURE — 1159F MED LIST DOCD IN RCRD: CPT | Performed by: PEDIATRICS

## 2025-07-09 PROCEDURE — 99203 OFFICE O/P NEW LOW 30 MIN: CPT | Performed by: PEDIATRICS

## 2025-07-09 ASSESSMENT — PAIN - FUNCTIONAL ASSESSMENT: PAIN_FUNCTIONAL_ASSESSMENT: 0-10

## 2025-07-09 NOTE — TELEPHONE ENCOUNTER
Clinical Pharmacy Phone Call    Returned call to patient to reschedule missed appointment and answer questions about Eliquis refill. Patient instructed to call  Alicia to schedule next delivery.    Clinical Pharmacy Follow-Up: July 17th, 2025 at 2:40 PM    Thank you,   Bobbi Srivastava, PharmD  Clinical Pharmacy Specialist, Primary Care   [phone]

## 2025-07-09 NOTE — LETTER
July 9, 2025     CALIXTO Noriega  [address]  [place], [place] [address]  [facility] [address]    Patient: Cassia Humphries   YOB: 1932   Date of Visit: 7/9/2025       Dear CALIXTO Elizondo:    Thank you for referring Cassia Humphries to me for evaluation. Below are my notes for this consultation.  If you have questions, please do not hesitate to call me. I look forward to following your patient along with you.       Sincerely,     Yakelin Dinh, DO      CC: No Recipients  ______________________________________________________________________________________    Chief Complaint   Patient presents with   • Right Knee - Pain   • Left Knee - Pain       Consulting physician: No referring provider defined for this encounter. 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  She has bilateral severe knee degenerative disease and has attempted  multiple conservative treatments including physical therapy, bracing devices and previous pain management consultation. Pain when transferring from wheel chair. Pain L knee when attempting to bear weight L during transfer. She is unable to walk nor stand for a long period of time. She is not a surgical candidate but would like to explore alternative remedies to address pain and improve strength and function for ADLs.  She recently has been managed by Anita Richards for care.  She had fallen months ago and no xray Left knee was performed at that time.   Daughter requests xrays Left knee today to evaluate for acute fracture/injury.    Cassia lives with her daughter who serves as her primary caregiver.     Xray bilateral knees were performed today.  Xrays Left knee  compared to xrays 12/2023.  Advanced degenerative joint disease noted but no acute fracture.  ROM of knee is limited.  Strength 3+/5 in knee and hip flexion.  Diffusely TTP.  I recommended Cassia and her daughter maintain home strengthening exercises and use of wheelchair.  High fall risk.  Will contact PCP to assist in coordination of palliative care.     Past [facility] HX:  Specialty Problems          Orthopaedic Problems    Primary osteoarthritis of both knees        Osteoarthritis of spine with radiculopathy, lumbar region        Lumbosacral spondylosis without myelopathy        Chronic pain of right knee        Arthritis of right knee        Arthritis pain        Closed nondisplaced fracture of left acetabulum with routine healing        Osteoarthritis of knee        Hammer toe of right foot        Rotator cuff arthropathy of both shoulders        VTE (venous thromboembolism)        Other pulmonary embolism with acute cor pulmonale, unspecified chronicity (Multi)        Tear of right rotator cuff        Closed posterior wall acetabular fx, left, initial encounter (Multi)            ROS  12 point ROS reviewed and is negative except for items listed       Social Hx:  Home:  Lives with daughter who serves as her primary caregiver    Medications: Medications Ordered Prior to Encounter[1]      Allergies:  Allergies[2]     Physical Exam:    Visit Vitals  Smoking Status Former        Vitals reviewed    General appearance: Oriented  Psych: sleepy.   Answers questions with 1-2 simple words    Neuro: Normal sensation to light touch throughout the involved extremities  Vascular: No extremity edema. Pulses equal b/l  Skin: Discoloration of extremities  Lymphatic: no regional lymphadenopathy present.  Eyes: no conjunctival injection.    Knee: Patella lateral L knee. Quad atrophy b/l. ROM limited.  Strength 3+/5 in knee and hip flexion.  Diffusely TTP.   Ankle: ROM intact, limited.  Strength 4-/5 dorsiflexion, plantarflexion,  internal and external rotation.    Sitting in wheel chair    Imaging:  Bilateral knee xrays obtained today. No acute fractures.  Advancement of degenerative osteoarthritis.   Imaging was personally interpreted and reviewed with the patient and/or family    Impression and Plan:  Cassia Humphries is a 89 y.o. female  who presented on 07/09/2025  with L knee pain, advanced degenerative osteoarthritis.     Xray bilateral knees were performed today.  Xrays Left knee compared to xrays 12/2023.  Advanced degenerative joint disease noted but no acute fracture.  ROM of knee is limited.  Strength 3+/5 in knee and hip flexion.  Diffusely TTP.  I recommended Cassia and her daughter maintain home strengthening exercises and use of wheelchair.  High fall risk.  Will contact PCP to assist in coordination of palliative care.           ** Please excuse any errors in grammar or translation related to this dictation. Voice recognition software was utilized to prepare this document. **         [1]  Current Outpatient Medications on File Prior to Visit   Medication Sig Dispense Refill   • acetaminophen (Tylenol Arthritis Pain) 650 mg ER tablet Take 1 tablet (650 mg) by mouth 2 times a day. Do not crush, chew, or split.     • acetaminophen (Tylenol Extra Strength) 500 mg tablet Take by mouth every 6 hours if needed for mild pain (1 - 3).     • apixaban (Eliquis) 5 mg tablet Take 1 tablet (5 mg) by mouth 2 times a day. 180 tablet 1   • ascorbic acid (Vitamin C) 1,000 mg tablet Take 1 capsule by mouth once daily.     • cholecalciferol (Vitamin D-3) 25 MCG (1000 UT) capsule Take 1 capsule (25 mcg) by mouth once daily.     • cyanocobalamin (Vitamin B-12) 500 mcg tablet Take 1 tablet (500 mcg) by mouth once daily.     • docusate sodium (Colace) 100 mg capsule Take 1 capsule (100 mg) by mouth if needed.     • erythromycin (Romycin) 5 mg/gram (0.5 %) ophthalmic ointment if needed.     • Lactobacillus acidophilus (ACIDOPHILUS ORAL) Take 1 tablet  by mouth once daily.     • lidocaine 4 % patch Place 1 patch on the skin once daily. Remove & discard patch within 12 hours or as directed by MD. Apply to back     • lisinopril 5 mg tablet Take 1 tablet (5 mg) by mouth once daily. 90 tablet 1   • peg 400-propylene glycol (Systane, propylene glycoL,) 0.4-0.3 % drops ophthalmic drops Administer 1 drop into both eyes 2 times a day as needed (dry eyes). (Patient taking differently: Administer 1 drop into both eyes 2 times a day.)     • sodium chloride 1,000 mg tablet Take 0.5 tablets (0.5 g) by mouth 2 times a day.       No current facility-administered medications on file prior to visit.   [2]  Allergies  Allergen Reactions   • Lorazepam Hives     Mental disorder   • Morphine Hives     Mental disorder/ confusion

## 2025-07-09 NOTE — PROGRESS NOTES
Chief Complaint   Patient presents with    Right Knee - Pain    Left Knee - Pain       Consulting physician: No referring provider defined for this encounter. 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  She has bilateral severe knee degenerative disease and has attempted  multiple conservative treatments including physical therapy, bracing devices and previous pain management consultation. Pain when transferring from wheel chair. Pain L knee when attempting to bear weight L during transfer. She is unable to walk nor stand for a long period of time. She is not a surgical candidate but would like to explore alternative remedies to address pain and improve strength and function for ADLs.  She recently has been managed by Anita Richards for care.  She had fallen months ago and no xray Left knee was performed at that time.   Daughter requests xrays Left knee today to evaluate for acute fracture/injury.    Cassia lives with her daughter who serves as her primary caregiver.     Xray bilateral knees were performed today.  Xrays Left knee compared to xrays 12/2023.  Advanced degenerative joint disease noted but no acute fracture.  ROM of knee is limited.  Strength 3+/5 in knee and hip flexion.  Diffusely TTP.  I recommended Cassia and her daughter maintain home strengthening exercises and use of wheelchair.  High fall risk.  Will contact PCP to assist in coordination of palliative care.     Past MSK HX:  Specialty Problems          Orthopaedic Problems    Primary osteoarthritis of both knees        Osteoarthritis of spine with radiculopathy, lumbar region        Lumbosacral spondylosis without myelopathy        Chronic pain of right knee         Arthritis of right knee        Arthritis pain        Closed nondisplaced fracture of left acetabulum with routine healing        Osteoarthritis of knee        Hammer toe of right foot        Rotator cuff arthropathy of both shoulders        VTE (venous thromboembolism)        Other pulmonary embolism with acute cor pulmonale, unspecified chronicity (Multi)        Tear of right rotator cuff        Closed posterior wall acetabular fx, left, initial encounter (Multi)            ROS  12 point ROS reviewed and is negative except for items listed       Social Hx:  Home:  Lives with daughter who serves as her primary caregiver    Medications: Medications Ordered Prior to Encounter[1]      Allergies:  Allergies[2]     Physical Exam:    Visit Vitals  Smoking Status Former        Vitals reviewed    General appearance: Oriented  Psych: sleepy.   Answers questions with 1-2 simple words    Neuro: Normal sensation to light touch throughout the involved extremities  Vascular: No extremity edema. Pulses equal b/l  Skin: Discoloration of extremities  Lymphatic: no regional lymphadenopathy present.  Eyes: no conjunctival injection.    Knee: Patella lateral L knee. Quad atrophy b/l. ROM limited.  Strength 3+/5 in knee and hip flexion.  Diffusely TTP.   Ankle: ROM intact, limited.  Strength 4-/5 dorsiflexion, plantarflexion, internal and external rotation.    Sitting in wheel chair    Imaging:  Bilateral knee xrays obtained today. No acute fractures.  Advancement of degenerative osteoarthritis.   Imaging was personally interpreted and reviewed with the patient and/or family    Impression and Plan:  Cassia Humphries is a 89 y.o. female  who presented on 07/09/2025  with L knee pain, advanced degenerative osteoarthritis.     Xray bilateral knees were performed today.  Xrays Left knee compared to xrays 12/2023.  Advanced degenerative joint disease noted but no acute fracture.  ROM of knee is limited.  Strength 3+/5 in knee and hip  flexion.  Diffusely TTP.  I recommended Cassia and her daughter maintain home strengthening exercises and use of wheelchair.  High fall risk.  Will contact PCP to assist in coordination of palliative care.           ** Please excuse any errors in grammar or translation related to this dictation. Voice recognition software was utilized to prepare this document. **         [1]   Current Outpatient Medications on File Prior to Visit   Medication Sig Dispense Refill    acetaminophen (Tylenol Arthritis Pain) 650 mg ER tablet Take 1 tablet (650 mg) by mouth 2 times a day. Do not crush, chew, or split.      acetaminophen (Tylenol Extra Strength) 500 mg tablet Take by mouth every 6 hours if needed for mild pain (1 - 3).      apixaban (Eliquis) 5 mg tablet Take 1 tablet (5 mg) by mouth 2 times a day. 180 tablet 1    ascorbic acid (Vitamin C) 1,000 mg tablet Take 1 capsule by mouth once daily.      cholecalciferol (Vitamin D-3) 25 MCG (1000 UT) capsule Take 1 capsule (25 mcg) by mouth once daily.      cyanocobalamin (Vitamin B-12) 500 mcg tablet Take 1 tablet (500 mcg) by mouth once daily.      docusate sodium (Colace) 100 mg capsule Take 1 capsule (100 mg) by mouth if needed.      erythromycin (Romycin) 5 mg/gram (0.5 %) ophthalmic ointment if needed.      Lactobacillus acidophilus (ACIDOPHILUS ORAL) Take 1 tablet by mouth once daily.      lidocaine 4 % patch Place 1 patch on the skin once daily. Remove & discard patch within 12 hours or as directed by MD. Apply to back      lisinopril 5 mg tablet Take 1 tablet (5 mg) by mouth once daily. 90 tablet 1    peg 400-propylene glycol (Systane, propylene glycoL,) 0.4-0.3 % drops ophthalmic drops Administer 1 drop into both eyes 2 times a day as needed (dry eyes). (Patient taking differently: Administer 1 drop into both eyes 2 times a day.)      sodium chloride 1,000 mg tablet Take 0.5 tablets (0.5 g) by mouth 2 times a day.       No current facility-administered medications on file  prior to visit.   [2]   Allergies  Allergen Reactions    Lorazepam Hives     Mental disorder    Morphine Hives     Mental disorder/ confusion

## 2025-07-10 ENCOUNTER — PHARMACY VISIT (OUTPATIENT)
Dept: PHARMACY | Facility: CLINIC | Age: OVER 89
End: 2025-07-10
Payer: MEDICARE

## 2025-07-17 ENCOUNTER — APPOINTMENT (OUTPATIENT)
Dept: PHARMACY | Facility: HOSPITAL | Age: OVER 89
End: 2025-07-17
Payer: MEDICARE

## 2025-07-17 DIAGNOSIS — I26.09 OTHER PULMONARY EMBOLISM WITH ACUTE COR PULMONALE, UNSPECIFIED CHRONICITY (MULTI): ICD-10-CM

## 2025-07-17 DIAGNOSIS — I48.91 ATRIAL FIBRILLATION, UNSPECIFIED TYPE (MULTI): Primary | ICD-10-CM

## 2025-07-17 NOTE — PROGRESS NOTES
Clinical Pharmacy Appointment    Patient ID: Cassia Humphries is a 89 y.o. female who presents for Anticoagulation.    Pt is here for Follow Up appointment.     Referring Provider: Lizbeth Richards APR*  PCP: HELEN Noriega-CNP   Last visit with PCP: 3/4/2025   Next visit with PCP: not yet scheduled     Patient Assistance for Eliquis approved through 4/10/2026. Will have to be renewed prior to that date to prevent lapse in coverage. Medication(s) will be received at no cost to [facility]    INTERVAL HISTORY   Patient's last appointment with clinical pharmacy team on 3/31/2025  Recent hospitalizations? No, ER visit in May after fall  Medication changes? No   Missed doses of medications? No   Side effects? No   Updated relevant labs? No   Recent falls or bleeds? Yes  Recent fall on left side, did hit head and was evaluated in the ER  Patient bruises easily but denies significant bleeding at this time      Subjective     HPI  ANTICOAGULATION  Does patient follow with cardiology? 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  She was treated with Eliquis 5 mg twice daily  Eliquis dose was eventually reduced to 2.5 mg twice daily after risk/benefit discussion  Patient had an ischemic stroke in fall 2024   Eliquis dose was increased back to 5 mg twice daily  She is projected to be on anticoagulation indefinitely    Current Anticoagulants:  Eliquis 5 mg twice daily (dose appropriate based on history)    Pertinent History:  Medical: Prior PE, DVT, stroke, and hip fracture  Social: Lives with daughter, uses wheelchair in the community, occasional wine  Medication: No current medications that may increase bleed risk    Monitoring:  Date of last BMP: 5/2/2025  Date of last CBC: 3/10/2025  Recent Hospitalizations: No  Recent Falls/Trauma: No  Changes in Tobacco or Alcohol Intake: No      Medication System Management  Patient's preferred pharmacy: [facility]  "in [place]  Adherence/Organization: no concerns identified  Affordability/Accessibility: enrolled in  Patient Assistance Program      Objective   Allergies   Allergen Reactions    Lorazepam Hives     Mental disorder    Morphine Hives     Mental disorder/ confusion     Social History     Social History Narrative    Not on file      Medication Review  Current Outpatient Medications   Medication Instructions    acetaminophen (TYLENOL ARTHRITIS PAIN) 650 mg, 2 times daily    acetaminophen (Tylenol Extra Strength) 500 mg tablet Every 6 hours PRN    ascorbic acid (Vitamin C) 1,000 mg tablet 1 capsule, Daily    cholecalciferol (Vitamin D-3) 25 MCG (1000 UT) capsule 1 capsule, Daily    cyanocobalamin (VITAMIN B-12) 500 mcg, Daily    docusate sodium (COLACE) 100 mg, As needed    Eliquis 5 mg, oral, 2 times daily    erythromycin (Romycin) 5 mg/gram (0.5 %) ophthalmic ointment As needed    Lactobacillus acidophilus (ACIDOPHILUS ORAL) 1 tablet, Daily    lidocaine 4 % patch 1 patch, Daily    lisinopril 5 mg, oral, Daily    peg 400-propylene glycol (Systane, propylene glycoL,) 0.4-0.3 % drops ophthalmic drops 1 drop, 2 times daily PRN    sodium chloride 0.5 g, 2 times daily      Vitals  BP Readings from Last 2 Encounters:   07/03/24 121/73   04/10/24 130/70     BMI Readings from Last 1 Encounters:   05/06/24 23.24 kg/m²      Labs  A1C  No results found for: \"HGBA1C\"  BMP  Lab Results   Component Value Date    CALCIUM 9.6 05/02/2025     05/02/2025    K 4.5 05/02/2025    CO2 26 05/02/2025     05/02/2025    BUN 18 05/02/2025    CREATININE 0.64 05/02/2025    EGFR 83 05/02/2025     LFTs  Lab Results   Component Value Date    ALT 31 01/20/2025    AST 26 01/20/2025    ALKPHOS 209 (H) 01/20/2025    BILITOT 0.4 01/20/2025     FLP  Lab Results   Component Value Date    TRIG 111 08/10/2018    CHOL 204 (H) 08/10/2018    LDLF 122 (H) 08/10/2018    HDL 59.6 08/10/2018     Urine Microalbumin  No results found for: " "\"MICROALBCREA\"  Weight Management  Wt Readings from Last 3 Encounters:   05/06/24 55.8 kg (123 lb)   04/15/24 55.8 kg (123 lb)   01/22/24 55.8 kg (123 lb)      There is no height or weight on file to calculate BMI.     Assessment/Plan   Problem List Items Addressed This Visit       Other pulmonary embolism with acute cor pulmonale, unspecified chronicity (Multi)    Relevant Orders    Referral to Clinical Pharmacy    Atrial fibrillation, unspecified type (Multi) - Primary    Relevant Orders    Referral to Clinical Pharmacy       DISCUSSION/PLAN:  Patient with history of PE, DVT, stroke, and atrial fibrillation currently on Eliquis. She does not follow with cardiology but is expected to be on anticoagulation indefinitely. She had a recent fall and continues to bruise easily, but caregiver denies missed doses or bleeding at this time. Continue current Eliquis dose for stroke/clot prevention. Follow up in 3 months or sooner if needed.    CONTINUE  Eliquis 5 mg twice daily    Monitoring and Education Discussed:  Counseled on Eliquis mechanism of action, dosing, drug interactions, side effects, and monitoring  Discussed signs of bleeding such as dark tarry stool or coffee ground-like emesis  Recommend seeking emergency treatment for head injury or uncontrollable bleeding  Answered all patient/family questions    Continue all meds under the continuation of care with the referring provider and clinical pharmacy team.    Clinical Pharmacist follow-up: October 1st, 2025 at 2:40 PM  Orders placed: none at this time    Thank you,   Bobbi Srivastava, PharmD  Clinical Pharmacy Specialist, Primary Care   [phone]    Verbal consent to manage patient's drug therapy was obtained from an individual authorized to act on behalf of a patient. Patient was informed they may decline to participate or withdraw from participation in pharmacy services at any time.  "

## 2025-09-03 ENCOUNTER — TELEPHONE (OUTPATIENT)
Dept: PRIMARY CARE | Facility: CLINIC | Age: OVER 89
End: 2025-09-03
Payer: MEDICARE

## 2025-09-05 ENCOUNTER — OFFICE VISIT (OUTPATIENT)
Dept: PRIMARY CARE | Facility: CLINIC | Age: OVER 89
End: 2025-09-05
Payer: MEDICARE

## 2025-09-05 VITALS
DIASTOLIC BLOOD PRESSURE: 60 MMHG | TEMPERATURE: 97.7 F | OXYGEN SATURATION: 97 % | WEIGHT: 125 LBS | SYSTOLIC BLOOD PRESSURE: 104 MMHG | HEART RATE: 59 BPM | BODY MASS INDEX: 23.62 KG/M2

## 2025-09-05 DIAGNOSIS — R41.82 ALTERED MENTAL STATUS, UNSPECIFIED ALTERED MENTAL STATUS TYPE: ICD-10-CM

## 2025-09-05 DIAGNOSIS — Z00.00 MEDICARE ANNUAL WELLNESS VISIT, SUBSEQUENT: Primary | ICD-10-CM

## 2025-09-05 DIAGNOSIS — R41.89 COGNITIVE IMPAIRMENT: ICD-10-CM

## 2025-09-05 DIAGNOSIS — Z86.73 HISTORY OF STROKE: ICD-10-CM

## 2025-09-05 DIAGNOSIS — E87.1 HYPONATREMIA: ICD-10-CM

## 2025-09-05 DIAGNOSIS — Z23 FLU VACCINE NEED: ICD-10-CM

## 2025-09-05 DIAGNOSIS — E53.8 B12 DEFICIENCY: ICD-10-CM

## 2025-09-05 DIAGNOSIS — I10 BENIGN ESSENTIAL HYPERTENSION: ICD-10-CM

## 2025-09-05 DIAGNOSIS — I48.91 ATRIAL FIBRILLATION, UNSPECIFIED TYPE (MULTI): ICD-10-CM

## 2025-09-05 DIAGNOSIS — D64.9 ANEMIA, UNSPECIFIED TYPE: ICD-10-CM

## 2025-09-05 ASSESSMENT — ACTIVITIES OF DAILY LIVING (ADL)
DRESSING: DEPENDENT
TAKING_MEDICATION: TOTAL CARE
GROCERY_SHOPPING: TOTAL CARE
BATHING: DEPENDENT
DOING_HOUSEWORK: TOTAL CARE
MANAGING_FINANCES: TOTAL CARE

## 2025-09-05 ASSESSMENT — PATIENT HEALTH QUESTIONNAIRE - PHQ9
SUM OF ALL RESPONSES TO PHQ9 QUESTIONS 1 AND 2: 0
2. FEELING DOWN, DEPRESSED OR HOPELESS: NOT AT ALL
1. LITTLE INTEREST OR PLEASURE IN DOING THINGS: NOT AT ALL

## 2025-09-06 PROBLEM — R41.89 COGNITIVE IMPAIRMENT: Status: ACTIVE | Noted: 2025-09-06

## 2025-09-06 PROBLEM — Z86.73 HISTORY OF STROKE: Status: ACTIVE | Noted: 2025-09-06

## 2025-09-06 ASSESSMENT — ENCOUNTER SYMPTOMS
WHEEZING: 0
NAUSEA: 0
SHORTNESS OF BREATH: 0
FEVER: 0
WEAKNESS: 1
PALPITATIONS: 0
DIARRHEA: 0
HEADACHES: 0
FATIGUE: 1
ABDOMINAL PAIN: 1
COUGH: 0
VOMITING: 0
CHILLS: 0
CONSTIPATION: 0
CHEST TIGHTNESS: 0

## 2025-10-01 ENCOUNTER — APPOINTMENT (OUTPATIENT)
Dept: PHARMACY | Facility: HOSPITAL | Age: OVER 89
End: 2025-10-01
Payer: MEDICARE

## 2026-03-09 ENCOUNTER — APPOINTMENT (OUTPATIENT)
Dept: PRIMARY CARE | Facility: CLINIC | Age: OVER 89
End: 2026-03-09
Payer: MEDICARE